# Patient Record
Sex: MALE | Race: WHITE | NOT HISPANIC OR LATINO | URBAN - METROPOLITAN AREA
[De-identification: names, ages, dates, MRNs, and addresses within clinical notes are randomized per-mention and may not be internally consistent; named-entity substitution may affect disease eponyms.]

---

## 2017-10-02 ENCOUNTER — EMERGENCY (EMERGENCY)
Facility: HOSPITAL | Age: 82
LOS: 1 days | Discharge: HOME | End: 2017-10-02
Attending: EMERGENCY MEDICINE
Payer: MEDICARE

## 2017-10-02 VITALS
RESPIRATION RATE: 18 BRPM | TEMPERATURE: 99 F | SYSTOLIC BLOOD PRESSURE: 121 MMHG | WEIGHT: 199.96 LBS | HEIGHT: 65 IN | HEART RATE: 77 BPM | DIASTOLIC BLOOD PRESSURE: 69 MMHG | OXYGEN SATURATION: 98 %

## 2017-10-02 PROCEDURE — 12002 RPR S/N/AX/GEN/TRNK2.6-7.5CM: CPT

## 2017-10-02 PROCEDURE — 70450 CT HEAD/BRAIN W/O DYE: CPT

## 2017-10-02 PROCEDURE — 72125 CT NECK SPINE W/O DYE: CPT | Mod: 26

## 2017-10-02 PROCEDURE — 99284 EMERGENCY DEPT VISIT MOD MDM: CPT | Mod: 25

## 2017-10-02 PROCEDURE — 72125 CT NECK SPINE W/O DYE: CPT

## 2017-10-02 PROCEDURE — 70450 CT HEAD/BRAIN W/O DYE: CPT | Mod: 26

## 2017-10-02 RX ORDER — ACETAMINOPHEN 500 MG
975 TABLET ORAL ONCE
Qty: 0 | Refills: 0 | Status: COMPLETED | OUTPATIENT
Start: 2017-10-02 | End: 2017-10-02

## 2017-10-02 RX ADMIN — Medication 975 MILLIGRAM(S): at 19:10

## 2017-10-02 NOTE — ED STATDOCS - CARE PLAN
Principal Discharge DX:	Scalp laceration, initial encounter  Secondary Diagnosis:	Fall, initial encounter  Secondary Diagnosis:	Traumatic injury of head, initial encounter

## 2017-10-02 NOTE — ED STATDOCS - MEDICAL DECISION MAKING DETAILS
Laceration s/p fall. not syncope. Td given by PCP today. CT of head and neck to eval for bleed/fx. ST for wound closure

## 2017-10-02 NOTE — ED STATDOCS - ATTENDING CONTRIBUTION TO CARE
I, Flakito Galvan, performed the initial face to face bedside interview with this patient regarding history of present illness, review of symptoms and relevant past medical, social and family history.  I completed an independent physical examination.  I was the initial provider who evaluated this patient. I have signed out the follow up of any pending tests (i.e. labs, radiological studies) to the ACP.  I have communicated the patient’s plan of care and disposition with the ACP.

## 2017-10-02 NOTE — ED STATDOCS - OBJECTIVE STATEMENT
87 yo M, with hx of parkinson's, spinal stenosis, presents to ED with daughter c/o head laceration s/p fall. Pt has hx of frequent falls and is following neurology. Pt states he was standing upright, lost his balance, and fell backwards hitting the back of his head sustaining a laceration. Denies preceding symptoms. Denies LOC, CP, SOB, abd pain, n/v, fever, chills, congestion. Denies blood thinners. Currently on ASA 325mg. Td is UTD

## 2017-10-02 NOTE — ED STATDOCS - PROGRESS NOTE DETAILS
85 yo M pm parkinson's and spinal stenosis presents to ED bib daughter c/o head laceration s/p fall. Pt states he was standing, lost his balance, and fell backwards hitting the back of his head against a door stop. Denies preceding symptoms. Denies LOC, HA, VC, CP, SOB, abd pain, n/v, fever, chills. Does not take aspirin or any blood thinners. Tetanus UTD. On exam patient has 4 cm horizontal laceration on occiput. PEERLA b/l. No wounds anywhere else. No C spine tenderness. Non focal neuro exam. Will f/u with intake physicians plan.

## 2017-10-02 NOTE — ED ADULT TRIAGE NOTE - CHIEF COMPLAINT QUOTE
Patient arrived to ED today with c/o fall backwards to a carpet floor while standing at home today.  patient denies LOC.  Patient does not take blood thinners.  Patient has laceration to the back of his skull.

## 2017-10-19 ENCOUNTER — RX RENEWAL (OUTPATIENT)
Age: 82
End: 2017-10-19

## 2017-12-21 ENCOUNTER — RX RENEWAL (OUTPATIENT)
Age: 82
End: 2017-12-21

## 2018-02-18 ENCOUNTER — INPATIENT (INPATIENT)
Facility: HOSPITAL | Age: 83
LOS: 2 days | Discharge: HOME | DRG: 194 | End: 2018-02-21
Attending: HOSPITALIST | Admitting: HOSPITALIST
Payer: MEDICARE

## 2018-02-18 VITALS
HEART RATE: 84 BPM | DIASTOLIC BLOOD PRESSURE: 66 MMHG | OXYGEN SATURATION: 98 % | SYSTOLIC BLOOD PRESSURE: 115 MMHG | WEIGHT: 205.03 LBS | RESPIRATION RATE: 20 BRPM | TEMPERATURE: 98 F | HEIGHT: 67 IN

## 2018-02-18 DIAGNOSIS — E86.0 DEHYDRATION: ICD-10-CM

## 2018-02-18 DIAGNOSIS — M48.00 SPINAL STENOSIS, SITE UNSPECIFIED: ICD-10-CM

## 2018-02-18 DIAGNOSIS — N40.0 BENIGN PROSTATIC HYPERPLASIA WITHOUT LOWER URINARY TRACT SYMPTOMS: ICD-10-CM

## 2018-02-18 DIAGNOSIS — Z29.9 ENCOUNTER FOR PROPHYLACTIC MEASURES, UNSPECIFIED: ICD-10-CM

## 2018-02-18 DIAGNOSIS — N17.9 ACUTE KIDNEY FAILURE, UNSPECIFIED: ICD-10-CM

## 2018-02-18 DIAGNOSIS — J10.1 INFLUENZA DUE TO OTHER IDENTIFIED INFLUENZA VIRUS WITH OTHER RESPIRATORY MANIFESTATIONS: ICD-10-CM

## 2018-02-18 DIAGNOSIS — I10 ESSENTIAL (PRIMARY) HYPERTENSION: ICD-10-CM

## 2018-02-18 DIAGNOSIS — G20 PARKINSON'S DISEASE: ICD-10-CM

## 2018-02-18 LAB
ANION GAP SERPL CALC-SCNC: 12 MMOL/L — SIGNIFICANT CHANGE UP (ref 5–17)
APPEARANCE UR: CLEAR — SIGNIFICANT CHANGE UP
BACTERIA # UR AUTO: ABNORMAL
BILIRUB UR-MCNC: NEGATIVE — SIGNIFICANT CHANGE UP
BUN SERPL-MCNC: 47 MG/DL — HIGH (ref 8–20)
CALCIUM SERPL-MCNC: 9.7 MG/DL — SIGNIFICANT CHANGE UP (ref 8.6–10.2)
CHLORIDE SERPL-SCNC: 98 MMOL/L — SIGNIFICANT CHANGE UP (ref 98–107)
CO2 SERPL-SCNC: 28 MMOL/L — SIGNIFICANT CHANGE UP (ref 22–29)
COLOR SPEC: YELLOW — SIGNIFICANT CHANGE UP
CREAT SERPL-MCNC: 1.77 MG/DL — HIGH (ref 0.5–1.3)
DIFF PNL FLD: ABNORMAL
EPI CELLS # UR: SIGNIFICANT CHANGE UP
FLUAV H1 2009 PAND RNA SPEC QL NAA+PROBE: DETECTED
GLUCOSE SERPL-MCNC: 114 MG/DL — SIGNIFICANT CHANGE UP (ref 70–115)
GLUCOSE UR QL: NEGATIVE MG/DL — SIGNIFICANT CHANGE UP
HCT VFR BLD CALC: 37 % — LOW (ref 42–52)
HGB BLD-MCNC: 12.2 G/DL — LOW (ref 14–18)
KETONES UR-MCNC: NEGATIVE — SIGNIFICANT CHANGE UP
LEUKOCYTE ESTERASE UR-ACNC: NEGATIVE — SIGNIFICANT CHANGE UP
MCHC RBC-ENTMCNC: 31.4 PG — HIGH (ref 27–31)
MCHC RBC-ENTMCNC: 33 G/DL — SIGNIFICANT CHANGE UP (ref 32–36)
MCV RBC AUTO: 95.4 FL — HIGH (ref 80–94)
NITRITE UR-MCNC: NEGATIVE — SIGNIFICANT CHANGE UP
PH UR: 6 — SIGNIFICANT CHANGE UP (ref 5–8)
PLATELET # BLD AUTO: 219 K/UL — SIGNIFICANT CHANGE UP (ref 150–400)
POTASSIUM SERPL-MCNC: 3.6 MMOL/L — SIGNIFICANT CHANGE UP (ref 3.5–5.3)
POTASSIUM SERPL-SCNC: 3.6 MMOL/L — SIGNIFICANT CHANGE UP (ref 3.5–5.3)
PROT UR-MCNC: NEGATIVE MG/DL — SIGNIFICANT CHANGE UP
RAPID RVP RESULT: DETECTED
RBC # BLD: 3.88 M/UL — LOW (ref 4.6–6.2)
RBC # FLD: 13.5 % — SIGNIFICANT CHANGE UP (ref 11–15.6)
RBC CASTS # UR COMP ASSIST: ABNORMAL /HPF (ref 0–4)
SODIUM SERPL-SCNC: 138 MMOL/L — SIGNIFICANT CHANGE UP (ref 135–145)
SP GR SPEC: 1.01 — SIGNIFICANT CHANGE UP (ref 1.01–1.02)
UROBILINOGEN FLD QL: NEGATIVE MG/DL — SIGNIFICANT CHANGE UP
WBC # BLD: 7.4 K/UL — SIGNIFICANT CHANGE UP (ref 4.8–10.8)
WBC # FLD AUTO: 7.4 K/UL — SIGNIFICANT CHANGE UP (ref 4.8–10.8)
WBC UR QL: SIGNIFICANT CHANGE UP

## 2018-02-18 PROCEDURE — 71045 X-RAY EXAM CHEST 1 VIEW: CPT | Mod: 26

## 2018-02-18 PROCEDURE — 99285 EMERGENCY DEPT VISIT HI MDM: CPT

## 2018-02-18 PROCEDURE — 99223 1ST HOSP IP/OBS HIGH 75: CPT | Mod: AI

## 2018-02-18 PROCEDURE — 76770 US EXAM ABDO BACK WALL COMP: CPT | Mod: 26

## 2018-02-18 PROCEDURE — 93970 EXTREMITY STUDY: CPT | Mod: 26

## 2018-02-18 RX ORDER — SODIUM CHLORIDE 9 MG/ML
1000 INJECTION INTRAMUSCULAR; INTRAVENOUS; SUBCUTANEOUS
Qty: 0 | Refills: 0 | Status: DISCONTINUED | OUTPATIENT
Start: 2018-02-18 | End: 2018-02-19

## 2018-02-18 RX ORDER — GABAPENTIN 400 MG/1
300 CAPSULE ORAL ONCE
Qty: 0 | Refills: 0 | Status: COMPLETED | OUTPATIENT
Start: 2018-02-18 | End: 2018-02-18

## 2018-02-18 RX ORDER — ENOXAPARIN SODIUM 100 MG/ML
40 INJECTION SUBCUTANEOUS EVERY 24 HOURS
Qty: 0 | Refills: 0 | Status: DISCONTINUED | OUTPATIENT
Start: 2018-02-18 | End: 2018-02-21

## 2018-02-18 RX ORDER — FENOFIBRATE,MICRONIZED 130 MG
48 CAPSULE ORAL DAILY
Qty: 0 | Refills: 0 | Status: DISCONTINUED | OUTPATIENT
Start: 2018-02-18 | End: 2018-02-21

## 2018-02-18 RX ORDER — ASCORBIC ACID 60 MG
250 TABLET,CHEWABLE ORAL DAILY
Qty: 0 | Refills: 0 | Status: DISCONTINUED | OUTPATIENT
Start: 2018-02-18 | End: 2018-02-21

## 2018-02-18 RX ORDER — FINASTERIDE 5 MG/1
5 TABLET, FILM COATED ORAL DAILY
Qty: 0 | Refills: 0 | Status: DISCONTINUED | OUTPATIENT
Start: 2018-02-18 | End: 2018-02-21

## 2018-02-18 RX ORDER — CARBIDOPA AND LEVODOPA 25; 100 MG/1; MG/1
1 TABLET ORAL THREE TIMES A DAY
Qty: 0 | Refills: 0 | Status: DISCONTINUED | OUTPATIENT
Start: 2018-02-18 | End: 2018-02-21

## 2018-02-18 RX ORDER — IBUPROFEN 200 MG
400 TABLET ORAL ONCE
Qty: 0 | Refills: 0 | Status: COMPLETED | OUTPATIENT
Start: 2018-02-18 | End: 2018-02-18

## 2018-02-18 RX ORDER — TAMSULOSIN HYDROCHLORIDE 0.4 MG/1
0.4 CAPSULE ORAL AT BEDTIME
Qty: 0 | Refills: 0 | Status: DISCONTINUED | OUTPATIENT
Start: 2018-02-18 | End: 2018-02-19

## 2018-02-18 RX ORDER — SODIUM CHLORIDE 9 MG/ML
3 INJECTION INTRAMUSCULAR; INTRAVENOUS; SUBCUTANEOUS ONCE
Qty: 0 | Refills: 0 | Status: COMPLETED | OUTPATIENT
Start: 2018-02-18 | End: 2018-02-18

## 2018-02-18 RX ORDER — PANTOPRAZOLE SODIUM 20 MG/1
40 TABLET, DELAYED RELEASE ORAL
Qty: 0 | Refills: 0 | Status: DISCONTINUED | OUTPATIENT
Start: 2018-02-18 | End: 2018-02-21

## 2018-02-18 RX ORDER — PYRIDOXINE HCL (VITAMIN B6) 100 MG
100 TABLET ORAL DAILY
Qty: 0 | Refills: 0 | Status: DISCONTINUED | OUTPATIENT
Start: 2018-02-18 | End: 2018-02-21

## 2018-02-18 RX ORDER — INDAPAMIDE 1.25 MG
2.5 TABLET ORAL EVERY 24 HOURS
Qty: 0 | Refills: 0 | Status: DISCONTINUED | OUTPATIENT
Start: 2018-02-18 | End: 2018-02-21

## 2018-02-18 RX ORDER — GABAPENTIN 400 MG/1
300 CAPSULE ORAL THREE TIMES A DAY
Qty: 0 | Refills: 0 | Status: DISCONTINUED | OUTPATIENT
Start: 2018-02-18 | End: 2018-02-21

## 2018-02-18 RX ADMIN — GABAPENTIN 300 MILLIGRAM(S): 400 CAPSULE ORAL at 16:40

## 2018-02-18 RX ADMIN — CARBIDOPA AND LEVODOPA 1 TABLET(S): 25; 100 TABLET ORAL at 22:03

## 2018-02-18 RX ADMIN — Medication 30 MILLIGRAM(S): at 16:39

## 2018-02-18 RX ADMIN — GABAPENTIN 300 MILLIGRAM(S): 400 CAPSULE ORAL at 22:03

## 2018-02-18 RX ADMIN — SODIUM CHLORIDE 100 MILLILITER(S): 9 INJECTION INTRAMUSCULAR; INTRAVENOUS; SUBCUTANEOUS at 16:38

## 2018-02-18 RX ADMIN — PANTOPRAZOLE SODIUM 40 MILLIGRAM(S): 20 TABLET, DELAYED RELEASE ORAL at 16:39

## 2018-02-18 RX ADMIN — SODIUM CHLORIDE 3 MILLILITER(S): 9 INJECTION INTRAMUSCULAR; INTRAVENOUS; SUBCUTANEOUS at 12:37

## 2018-02-18 RX ADMIN — SODIUM CHLORIDE 3 MILLILITER(S): 9 INJECTION INTRAMUSCULAR; INTRAVENOUS; SUBCUTANEOUS at 14:06

## 2018-02-18 RX ADMIN — Medication 400 MILLIGRAM(S): at 16:41

## 2018-02-18 RX ADMIN — SODIUM CHLORIDE 100 MILLILITER(S): 9 INJECTION INTRAMUSCULAR; INTRAVENOUS; SUBCUTANEOUS at 22:04

## 2018-02-18 RX ADMIN — TAMSULOSIN HYDROCHLORIDE 0.4 MILLIGRAM(S): 0.4 CAPSULE ORAL at 22:02

## 2018-02-18 NOTE — ED PROVIDER NOTE - MEDICAL DECISION MAKING DETAILS
PT WITH COUGH WEAKNESS RUNNY NOSE. WIFE ADMITTED YESTERDAY FOR RESP DIFFICULTY.  PT NEEDS LABS, XRAY CHEST, FLU SWAB PT WITH COUGH WEAKNESS RUNNY NOSE. WIFE ADMITTED YESTERDAY FOR RESP DIFFICULTY.  PT NEEDS LABS, XRAY CHEST, FLU SWAB  UNABLE TO EVEN PARTAKE IN PT . ADMIT FOR WEAKNESS, DEHYDRATION, GAIT DISTURBANCE

## 2018-02-18 NOTE — ED ADULT TRIAGE NOTE - CHIEF COMPLAINT QUOTE
pt BIBA for reports of SOB and cough x few days. pt reports family dx with pneumonia and thinks he is getting sick. no fever, no SOB, no chest pain, AOX3. appears comfortable

## 2018-02-18 NOTE — PHYSICAL THERAPY INITIAL EVALUATION ADULT - LEVEL OF INDEPENDENCE: SIT/STAND, REHAB EVAL
unable to perform/Increased extensor tone in trunk, pt's LEs extended at EOB, minimally able to flex Right knee to attempt to reposition pt on EOB, Unsafe to attempt to stand

## 2018-02-18 NOTE — H&P ADULT - ASSESSMENT
87 yo M with h/o Parkinson's disease, BPH, GERD, HLD, spinal stenosis here with influenza A and falls.

## 2018-02-18 NOTE — ED PROVIDER NOTE - CARE PLAN
Principal Discharge DX:	Dehydration, moderate  Secondary Diagnosis:	Weakness  Secondary Diagnosis:	Gait disturbance

## 2018-02-18 NOTE — ED PROVIDER NOTE - OBJECTIVE STATEMENT
87 YO MALE WITH COUGH . RUNNY NOSE ONE MONTH. NON PRODUCTIVE COUGH. INCREASED WEAKNESS X 4 MONTHS. DAUGHTER STATES OVER 2 WEEKS AGO HER FELL THREE TIMES. NO FEVER OR CHILLS. HE DOES HAVE EPISODES OF SWEATING BUT THIS IS NOT NEW. FOR PT. NO V/D NO HA OR DIZZY.  MED HX Noel esophagus    High Cholesterol    History of Hypertension    Hypertrophy (Benign) of Prostate    Parkinson disease    Spinal stenosis

## 2018-02-18 NOTE — H&P ADULT - NEGATIVE OPHTHALMOLOGIC SYMPTOMS
no photophobia/no diplopia
I, Neyda Lipscomb, performed the initial face to face bedside interview with this patient regarding history of present illness, review of symptoms and relevant past medical, social and family history.  I completed an independent physical examination.  I was the initial provider who evaluated this patient. I have signed out the follow up of any pending tests (i.e. labs, radiological studies) to the ACP.  I have communicated the patient’s plan of care and disposition with the ACP.

## 2018-02-18 NOTE — ED PROVIDER NOTE - PMH
Noel esophagus    High Cholesterol    History of Hypertension    Hypertrophy (Benign) of Prostate    Parkinson disease    Spinal stenosis

## 2018-02-18 NOTE — ED ADULT NURSE NOTE - OBJECTIVE STATEMENT
pt c/o a sore throat x 2 weeks , cough, bilateral rib pain and falling three times two weeks ago. pain to back 8/10 rib 2/10 and throat 4/10. pt denies vomiting or diarrhea

## 2018-02-18 NOTE — PHYSICAL THERAPY INITIAL EVALUATION ADULT - RANGE OF MOTION EXAMINATION, REHAB EVAL
bilateral upper extremity ROM was WFL (within functional limits)/deficits as listed below/Left knee AROM and PROM limited to lacking 5 degrees extension to approx 10-15 degrees flexion) , Right knee ROM limited 2*2 pain (pt is 4 years s/p TKR)

## 2018-02-18 NOTE — PHYSICAL THERAPY INITIAL EVALUATION ADULT - ADDITIONAL COMMENTS
Pt lives in a private home with his wife (currently admitted in ICU) 6 steps to enter with handrails, 6+6 steps inside with handrails. Pt was independent 3 weeks PTA with RW. Pt owns RW, SAC and commode.

## 2018-02-18 NOTE — CHART NOTE - NSCHARTNOTEFT_GEN_A_CORE
REASON FOR CONSULT: non productive cough, + flu like symtpoms    SUBJECTIVE:  85 YO MALE WITH NON PRODUCTIVE COUGH . RUNNY NOSE ONE MONTH. SIMILAR SYMPTOMS WITH HIS WIFE, WHO WAS ADMITTED ONE DAY PRIOR TO [facility] FOR PNA. INCREASED WEAKNESS X 4 MONTHS. DAUGHTER STATES OVER 2 WEEKS AGO HER FELL THREE TIMES. NO FEVER OR CHILLS. HE DOES HAVE EPISODES OF SWEATING BUT THIS IS NOT NEW. FOR PT. NO V/D NO HA OR DIZZY.  	  · Presenting Symptoms: COUGH, WEAKNESS  · Negative Findings: no chills, no fever, no nausea, no vomiting, no dysuria, or gross hematuria   · Location: NA  · Radiation: NA  · Timing: gradual onset  · Duration: week(s)  · Severity: MILD  · Aggravating Factors: NA  · Relieving Factors: NA      OBJECTIVE  ROS:  all other negative except as noted above    PHYSICAL EXAM: Tele-evaluation precludes physical exam.     LABS AND IMAGING DATA:                        12.2   7.4   )-----------( 219      ( 2018 13:09 )             37.0     02-18    138  |  98  |  47.0<H>  ----------------------------<  114  3.6   |  28.0  |  1.77<H>    Ca    9.7      2018 13:09      Urinalysis Basic - ( 2018 13:08 )    Color: Yellow / Appearance: Clear / S.015 / pH: x  Gluc: x / Ketone: Negative  / Bili: Negative / Urobili: Negative mg/dL   Blood: x / Protein: Negative mg/dL / Nitrite: Negative   Leuk Esterase: Negative / RBC: 6-10 /HPF / WBC 0-2   Sq Epi: x / Non Sq Epi: Occasional / Bacteria: Occasional    RVP: Infl A positive    CXR: lungs are clear    ASSESSMENT AND PLAN:   85 y/o male with flu like symptoms was brought in for further evaluation as his wife who had similar symptoms was admitted to [facility] one day prior to PNA. PT was also noted to have JETT with microhematuria and c/o recently developing urinary incontinence. RVP positive for influenza A, CXR lungs are clear. PT evaluation completed while in ER, recommend MORIS placement, as patient required one person assist with history of multiple falls. Medication reconcilliation not completed as family at bedside (son and daughter) does not know patient medications., they where advised to call RN once at home to verify medications.     Admit to medical unit    Influenza A positive; Non-productive Coughing, + flu like symptoms  -	Tamiflu started  -	Isolation  -	CXR grossly clear, no infectious etiology noted. No leukocytosis. Afebrile.   Failure to Thrive  -	 consult for MORIS placement vs home HA  -	Regular diet with supplement  -	Nutrition consult  JETT with Micro hematuria  -	Gentle IV hydration   -	Bladder/Kidney sonogram  -	monitor renal function in AM   Anemia - cont to monitor, no acute signs or symptoms of bleeding, no indication for transfusion      H/O Parkinson’s – high risk of falls, will need to resume home medications once family calls with medication list.     H/O multiple falls, Lumbar stenosis with neurogenic claudication  -	OOB with assistance only  -	PT consult appreciated  -	Fall / aspiration precautions    DVT prophylaxis   Care plan discussed with Sandrine

## 2018-02-18 NOTE — ED BEHAVIORAL HEALTH NOTE - BEHAVIORAL HEALTH NOTE
SW note: pt. is a 86 year old male who's wife is admitted to the hospital with pneumonia.  Pt. has history of falls and family requesting assistance.  Pt. and wife live with son and daughter comes over to assist also.  Daughter asked for information regarding getting aids at home and stated they are willing to private pay if needed.  This worker provided family with information for [employer] at home as well as list of private pay aids.  daughter stated they will contact and set them up.  Asked pt. if he is interested in home PT, pt. reluctantly agreed.  MD was made aware and put in a PT consult.

## 2018-02-18 NOTE — ED CLERICAL - NS ED CLERK UNITS
2BRK/need 2nd orders 2BRK/ISO ISO/+ Flu A/2BRK 2GUL/2305-01 ISO/+ Flu A 2GUL/ISO/+ Flu A 2413-02 +FLU A/2GUL 391717/2BRK

## 2018-02-18 NOTE — H&P ADULT - RS GEN PE MLT RESP DETAILS PC
no intercostal retractions/breath sounds equal/respirations non-labored/no rales/no rhonchi/good air movement/airway patent/clear to auscultation bilaterally

## 2018-02-18 NOTE — H&P ADULT - HISTORY OF PRESENT ILLNESS
87 yo M with h/o Parkinson's disease, BPH, GERD, HLD, spinal stenosis presents with one month of runny nose. Pt notes worsening weakness and having fallen 3 times over the last 2 weeks. Pt denies any fevers or chills. States that his wife had similar symptoms and was recently hospitalized for PNA. In the ED, pt was found to be influenza A +.

## 2018-02-18 NOTE — PHYSICAL THERAPY INITIAL EVALUATION ADULT - PERTINENT HX OF CURRENT PROBLEM, REHAB EVAL
Per MD Note: 85 YO MALE WITH COUGH . RUNNY NOSE ONE MONTH. NON PRODUCTIVE COUGH. INCREASED WEAKNESS X 4 MONTHS. DAUGHTER STATES OVER 2 WEEKS AGO HER FELL THREE TIMES. Per MD Note: 87 YO MALE WITH COUGH . RUNNY NOSE ONE MONTH. NON PRODUCTIVE COUGH. INCREASED WEAKNESS X 4 MONTHS. DAUGHTER STATES OVER 2 WEEKS AGO HER FELL THREE TIMES. Pt admitted for r/o flu

## 2018-02-18 NOTE — PHYSICAL THERAPY INITIAL EVALUATION ADULT - GENERAL OBSERVATIONS, REHAB EVAL
Pt received supine on stretcher in ED, daughter at bedside, c/o pain in bilateral quads 2*2 "cramping" Pt agreeable to PT

## 2018-02-19 LAB
ANION GAP SERPL CALC-SCNC: 13 MMOL/L — SIGNIFICANT CHANGE UP (ref 5–17)
BUN SERPL-MCNC: 38 MG/DL — HIGH (ref 8–20)
CALCIUM SERPL-MCNC: 8.8 MG/DL — SIGNIFICANT CHANGE UP (ref 8.6–10.2)
CHLORIDE SERPL-SCNC: 102 MMOL/L — SIGNIFICANT CHANGE UP (ref 98–107)
CO2 SERPL-SCNC: 26 MMOL/L — SIGNIFICANT CHANGE UP (ref 22–29)
CREAT SERPL-MCNC: 1.51 MG/DL — HIGH (ref 0.5–1.3)
GLUCOSE SERPL-MCNC: 104 MG/DL — SIGNIFICANT CHANGE UP (ref 70–115)
HCT VFR BLD CALC: 34.4 % — LOW (ref 42–52)
HGB BLD-MCNC: 11 G/DL — LOW (ref 14–18)
MAGNESIUM SERPL-MCNC: 1.8 MG/DL — SIGNIFICANT CHANGE UP (ref 1.6–2.6)
MCHC RBC-ENTMCNC: 30.9 PG — SIGNIFICANT CHANGE UP (ref 27–31)
MCHC RBC-ENTMCNC: 32 G/DL — SIGNIFICANT CHANGE UP (ref 32–36)
MCV RBC AUTO: 96.6 FL — HIGH (ref 80–94)
PHOSPHATE SERPL-MCNC: 2.8 MG/DL — SIGNIFICANT CHANGE UP (ref 2.4–4.7)
PLATELET # BLD AUTO: 212 K/UL — SIGNIFICANT CHANGE UP (ref 150–400)
POTASSIUM SERPL-MCNC: 3.3 MMOL/L — LOW (ref 3.5–5.3)
POTASSIUM SERPL-SCNC: 3.3 MMOL/L — LOW (ref 3.5–5.3)
RBC # BLD: 3.56 M/UL — LOW (ref 4.6–6.2)
RBC # FLD: 13.9 % — SIGNIFICANT CHANGE UP (ref 11–15.6)
SODIUM SERPL-SCNC: 141 MMOL/L — SIGNIFICANT CHANGE UP (ref 135–145)
WBC # BLD: 5 K/UL — SIGNIFICANT CHANGE UP (ref 4.8–10.8)
WBC # FLD AUTO: 5 K/UL — SIGNIFICANT CHANGE UP (ref 4.8–10.8)

## 2018-02-19 PROCEDURE — 99232 SBSQ HOSP IP/OBS MODERATE 35: CPT

## 2018-02-19 RX ORDER — TAMSULOSIN HYDROCHLORIDE 0.4 MG/1
0.8 CAPSULE ORAL AT BEDTIME
Qty: 0 | Refills: 0 | Status: DISCONTINUED | OUTPATIENT
Start: 2018-02-19 | End: 2018-02-21

## 2018-02-19 RX ORDER — ACETAMINOPHEN 500 MG
650 TABLET ORAL ONCE
Qty: 0 | Refills: 0 | Status: COMPLETED | OUTPATIENT
Start: 2018-02-19 | End: 2018-02-19

## 2018-02-19 RX ORDER — POTASSIUM CHLORIDE 20 MEQ
40 PACKET (EA) ORAL EVERY 4 HOURS
Qty: 0 | Refills: 0 | Status: COMPLETED | OUTPATIENT
Start: 2018-02-19 | End: 2018-02-19

## 2018-02-19 RX ADMIN — GABAPENTIN 300 MILLIGRAM(S): 400 CAPSULE ORAL at 13:06

## 2018-02-19 RX ADMIN — CARBIDOPA AND LEVODOPA 1 TABLET(S): 25; 100 TABLET ORAL at 05:31

## 2018-02-19 RX ADMIN — Medication 2.5 MILLIGRAM(S): at 05:31

## 2018-02-19 RX ADMIN — Medication 650 MILLIGRAM(S): at 23:26

## 2018-02-19 RX ADMIN — CARBIDOPA AND LEVODOPA 1 TABLET(S): 25; 100 TABLET ORAL at 13:06

## 2018-02-19 RX ADMIN — PANTOPRAZOLE SODIUM 40 MILLIGRAM(S): 20 TABLET, DELAYED RELEASE ORAL at 05:31

## 2018-02-19 RX ADMIN — SODIUM CHLORIDE 100 MILLILITER(S): 9 INJECTION INTRAMUSCULAR; INTRAVENOUS; SUBCUTANEOUS at 01:48

## 2018-02-19 RX ADMIN — GABAPENTIN 300 MILLIGRAM(S): 400 CAPSULE ORAL at 05:31

## 2018-02-19 RX ADMIN — Medication 30 MILLIGRAM(S): at 05:31

## 2018-02-19 RX ADMIN — PANTOPRAZOLE SODIUM 40 MILLIGRAM(S): 20 TABLET, DELAYED RELEASE ORAL at 17:22

## 2018-02-19 RX ADMIN — FINASTERIDE 5 MILLIGRAM(S): 5 TABLET, FILM COATED ORAL at 13:06

## 2018-02-19 RX ADMIN — Medication 1 TABLET(S): at 11:19

## 2018-02-19 RX ADMIN — GABAPENTIN 300 MILLIGRAM(S): 400 CAPSULE ORAL at 21:46

## 2018-02-19 RX ADMIN — Medication 100 MILLIGRAM(S): at 11:20

## 2018-02-19 RX ADMIN — Medication 1 TABLET(S): at 11:18

## 2018-02-19 RX ADMIN — Medication 250 MILLIGRAM(S): at 11:19

## 2018-02-19 RX ADMIN — Medication 30 MILLIGRAM(S): at 17:22

## 2018-02-19 RX ADMIN — Medication 40 MILLIEQUIVALENT(S): at 17:22

## 2018-02-19 RX ADMIN — Medication 40 MILLIEQUIVALENT(S): at 13:05

## 2018-02-19 RX ADMIN — TAMSULOSIN HYDROCHLORIDE 0.8 MILLIGRAM(S): 0.4 CAPSULE ORAL at 21:46

## 2018-02-19 RX ADMIN — Medication 40 MILLIEQUIVALENT(S): at 11:18

## 2018-02-19 RX ADMIN — Medication 48 MILLIGRAM(S): at 11:21

## 2018-02-19 RX ADMIN — CARBIDOPA AND LEVODOPA 1 TABLET(S): 25; 100 TABLET ORAL at 21:46

## 2018-02-20 ENCOUNTER — TRANSCRIPTION ENCOUNTER (OUTPATIENT)
Age: 83
End: 2018-02-20

## 2018-02-20 LAB
ANION GAP SERPL CALC-SCNC: 9 MMOL/L — SIGNIFICANT CHANGE UP (ref 5–17)
BUN SERPL-MCNC: 33 MG/DL — HIGH (ref 8–20)
CALCIUM SERPL-MCNC: 9 MG/DL — SIGNIFICANT CHANGE UP (ref 8.6–10.2)
CHLORIDE SERPL-SCNC: 104 MMOL/L — SIGNIFICANT CHANGE UP (ref 98–107)
CO2 SERPL-SCNC: 28 MMOL/L — SIGNIFICANT CHANGE UP (ref 22–29)
CREAT SERPL-MCNC: 1.39 MG/DL — HIGH (ref 0.5–1.3)
GLUCOSE SERPL-MCNC: 103 MG/DL — SIGNIFICANT CHANGE UP (ref 70–115)
HCT VFR BLD CALC: 35.7 % — LOW (ref 42–52)
HGB BLD-MCNC: 11.5 G/DL — LOW (ref 14–18)
MCHC RBC-ENTMCNC: 31.2 PG — HIGH (ref 27–31)
MCHC RBC-ENTMCNC: 32.2 G/DL — SIGNIFICANT CHANGE UP (ref 32–36)
MCV RBC AUTO: 96.7 FL — HIGH (ref 80–94)
PLATELET # BLD AUTO: 205 K/UL — SIGNIFICANT CHANGE UP (ref 150–400)
POTASSIUM SERPL-MCNC: 4.2 MMOL/L — SIGNIFICANT CHANGE UP (ref 3.5–5.3)
POTASSIUM SERPL-SCNC: 4.2 MMOL/L — SIGNIFICANT CHANGE UP (ref 3.5–5.3)
RBC # BLD: 3.69 M/UL — LOW (ref 4.6–6.2)
RBC # FLD: 13.7 % — SIGNIFICANT CHANGE UP (ref 11–15.6)
SODIUM SERPL-SCNC: 141 MMOL/L — SIGNIFICANT CHANGE UP (ref 135–145)
WBC # BLD: 5.7 K/UL — SIGNIFICANT CHANGE UP (ref 4.8–10.8)
WBC # FLD AUTO: 5.7 K/UL — SIGNIFICANT CHANGE UP (ref 4.8–10.8)

## 2018-02-20 PROCEDURE — 99232 SBSQ HOSP IP/OBS MODERATE 35: CPT

## 2018-02-20 RX ORDER — TAMSULOSIN HYDROCHLORIDE 0.4 MG/1
1 CAPSULE ORAL
Qty: 0 | Refills: 0 | DISCHARGE
Start: 2018-02-20

## 2018-02-20 RX ORDER — TAMSULOSIN HYDROCHLORIDE 0.4 MG/1
2 CAPSULE ORAL
Qty: 0 | Refills: 0 | DISCHARGE
Start: 2018-02-20

## 2018-02-20 RX ORDER — IBUPROFEN 200 MG
400 TABLET ORAL THREE TIMES A DAY
Qty: 0 | Refills: 0 | Status: DISCONTINUED | OUTPATIENT
Start: 2018-02-20 | End: 2018-02-21

## 2018-02-20 RX ORDER — TAMSULOSIN HYDROCHLORIDE 0.4 MG/1
1 CAPSULE ORAL
Qty: 0 | Refills: 0 | COMMUNITY

## 2018-02-20 RX ORDER — IBUPROFEN 200 MG
1 TABLET ORAL
Qty: 0 | Refills: 0 | COMMUNITY
Start: 2018-02-20

## 2018-02-20 RX ADMIN — ENOXAPARIN SODIUM 40 MILLIGRAM(S): 100 INJECTION SUBCUTANEOUS at 05:40

## 2018-02-20 RX ADMIN — Medication 400 MILLIGRAM(S): at 22:50

## 2018-02-20 RX ADMIN — Medication 30 MILLIGRAM(S): at 05:40

## 2018-02-20 RX ADMIN — CARBIDOPA AND LEVODOPA 1 TABLET(S): 25; 100 TABLET ORAL at 14:00

## 2018-02-20 RX ADMIN — Medication 250 MILLIGRAM(S): at 13:03

## 2018-02-20 RX ADMIN — GABAPENTIN 300 MILLIGRAM(S): 400 CAPSULE ORAL at 21:47

## 2018-02-20 RX ADMIN — TAMSULOSIN HYDROCHLORIDE 0.8 MILLIGRAM(S): 0.4 CAPSULE ORAL at 21:47

## 2018-02-20 RX ADMIN — Medication 1 TABLET(S): at 13:02

## 2018-02-20 RX ADMIN — GABAPENTIN 300 MILLIGRAM(S): 400 CAPSULE ORAL at 05:40

## 2018-02-20 RX ADMIN — Medication 400 MILLIGRAM(S): at 21:50

## 2018-02-20 RX ADMIN — PANTOPRAZOLE SODIUM 40 MILLIGRAM(S): 20 TABLET, DELAYED RELEASE ORAL at 17:01

## 2018-02-20 RX ADMIN — Medication 400 MILLIGRAM(S): at 13:07

## 2018-02-20 RX ADMIN — GABAPENTIN 300 MILLIGRAM(S): 400 CAPSULE ORAL at 13:02

## 2018-02-20 RX ADMIN — CARBIDOPA AND LEVODOPA 1 TABLET(S): 25; 100 TABLET ORAL at 05:40

## 2018-02-20 RX ADMIN — Medication 400 MILLIGRAM(S): at 14:00

## 2018-02-20 RX ADMIN — FINASTERIDE 5 MILLIGRAM(S): 5 TABLET, FILM COATED ORAL at 13:02

## 2018-02-20 RX ADMIN — Medication 100 MILLIGRAM(S): at 13:02

## 2018-02-20 RX ADMIN — Medication 1 TABLET(S): at 13:59

## 2018-02-20 RX ADMIN — Medication 30 MILLIGRAM(S): at 17:01

## 2018-02-20 RX ADMIN — Medication 48 MILLIGRAM(S): at 13:02

## 2018-02-20 RX ADMIN — CARBIDOPA AND LEVODOPA 1 TABLET(S): 25; 100 TABLET ORAL at 21:47

## 2018-02-20 RX ADMIN — PANTOPRAZOLE SODIUM 40 MILLIGRAM(S): 20 TABLET, DELAYED RELEASE ORAL at 05:40

## 2018-02-20 RX ADMIN — Medication 2.5 MILLIGRAM(S): at 05:40

## 2018-02-20 NOTE — DISCHARGE NOTE ADULT - SECONDARY DIAGNOSIS.
JETT (acute kidney injury) Dehydration, moderate Essential hypertension Parkinson disease Spinal stenosis BPH (benign prostatic hyperplasia)

## 2018-02-20 NOTE — DISCHARGE NOTE ADULT - HOSPITAL COURSE
87 yo M with h/o Parkinson's disease, BPH, GERD, HLD, spinal stenosis presents with one month of runny nose. Pt notes worsening weakness and having fallen 3 times over the last 2 weeks. Pt denies any fevers or chills. States that his wife had similar symptoms and was recently hospitalized for PNA. In the ED, pt was found to be influenza A +. Started on tamiflu. Found to have JETT which improved with IVF hydration. Renal sono negative for hydronephrosis, but increased residual volume. Flomax increased to 0.8mg. Physical therapy recommending MORIS.     Time to discharge: >35 minutes spent coordinating care  Pt and daughter, Demi, agreeable to discharge plan.

## 2018-02-20 NOTE — OCCUPATIONAL THERAPY INITIAL EVALUATION ADULT - VISUAL ACUITY
pt denies current changes/issues with vision however with history of visual deficits (i.e. macular degeneration)

## 2018-02-20 NOTE — DISCHARGE NOTE ADULT - CARE PLAN
Principal Discharge DX:	Influenza A  Goal:	Therapeutic optimization  Assessment and plan of treatment:	Continue with Tamiflu until 2/22/18. Follow up with your regular doctor in one week.  Secondary Diagnosis:	EJTT (acute kidney injury)  Assessment and plan of treatment:	Stable. Maintain oral hydration. Follow up with your regular doctor in one week.  Secondary Diagnosis:	Dehydration, moderate  Assessment and plan of treatment:	Resolved. Maintain oral hydration in one week.  Secondary Diagnosis:	Essential hypertension  Assessment and plan of treatment:	Continue with indapamide.  Secondary Diagnosis:	Parkinson disease  Assessment and plan of treatment:	Continue with sinemet.  Secondary Diagnosis:	Spinal stenosis  Assessment and plan of treatment:	Continue with ibuprofen as needed.  Secondary Diagnosis:	BPH (benign prostatic hyperplasia)  Assessment and plan of treatment:	Your flomax was increased to 0.8mg. Follow up with your urologist in one week.

## 2018-02-20 NOTE — DISCHARGE NOTE ADULT - PATIENT PORTAL LINK FT
You can access the [facility] Patient Portal, offered by [facility], by registering with the following website: [email or web address]

## 2018-02-20 NOTE — DISCHARGE NOTE ADULT - MEDICATION SUMMARY - MEDICATIONS TO TAKE
I will START or STAY ON the medications listed below when I get home from the hospital:    finasteride 5 mg oral tablet  -- 1 tab(s) by mouth once a day  -- Indication: For BPH (benign prostatic hyperplasia)    ibuprofen 400 mg oral tablet  -- 1 tab(s) by mouth 3 times a day, As needed, pain  -- Indication: For Spinal stenosis    Flomax 0.4 mg oral capsule  -- 2 cap(s) by mouth once a day (at bedtime)  -- Indication: For BPH (benign prostatic hyperplasia)    gabapentin 300 mg oral capsule  -- 1 cap(s) by mouth 3 times a day  -- Indication: For Neuropathy    fenofibric acid 45 mg oral delayed release capsule  -- 1 cap(s) by mouth once a day  -- Indication: For HLD    carbidopa-levodopa 25 mg-250 mg oral tablet  -- 1 tab(s) by mouth 3 times a day  -- Indication: For Parkinson disease    oseltamivir 30 mg oral capsule  -- 1 cap(s) by mouth 2 times a day  -- Indication: For Influenza A    indapamide 2.5 mg oral tablet  -- 1 tab(s) by mouth once a day (in the morning)  -- Indication: For Htn    pantoprazole 40 mg oral delayed release tablet  -- 1 tab(s) by mouth 2 times a day (before meals)  -- Indication: For Gerd    Multiple Vitamins oral tablet  -- 1 tab(s) by mouth once a day  -- Indication: For Supplement    Calcium 600+D oral tablet  -- 1 tab(s) by mouth once a day  -- Indication: For Supplement    PreserVision AREDS 2 oral capsule  -- 1 cap(s) by mouth once a day  -- Indication: For Supplement    Vitamin B6 100 mg oral tablet  -- 1 tab(s) by mouth once a day  -- Indication: For Supplement    Vitamin C 250 mg oral tablet  -- 1 tab(s) by mouth once a day  -- Indication: For Supplement

## 2018-02-20 NOTE — OCCUPATIONAL THERAPY INITIAL EVALUATION ADULT - FINE MOTOR COORDINATION EXAM
fine motor coordination impairments premorbid as per daughter due to Parkinson's and c-spinal stenosis

## 2018-02-20 NOTE — DISCHARGE NOTE ADULT - PLAN OF CARE
Therapeutic optimization Continue with Tamiflu until 2/22/18. Follow up with your regular doctor in one week. Stable. Maintain oral hydration. Follow up with your regular doctor in one week. Resolved. Maintain oral hydration in one week. Continue with indapamide. Continue with sinemet. Continue with ibuprofen as needed. Your flomax was increased to 0.8mg. Follow up with your urologist in one week.

## 2018-02-20 NOTE — OCCUPATIONAL THERAPY INITIAL EVALUATION ADULT - NS ASR FOLLOW COMMAND OT EVAL
decreased processing; requires repetition and increased time/able to follow single-step instructions/75% of the time

## 2018-02-20 NOTE — OCCUPATIONAL THERAPY INITIAL EVALUATION ADULT - ADDITIONAL COMMENTS
Pt lives in private home with 6 DUSTIN and 6 steps inside up to bedroom/bathroom. Bathroom with shower with doors and (+) grab bars in shower. Pt owns RW, cane, commode. Pt is right handed. Pt does not drive. Daughter/pt reports frequent falls prior to admission.   Pt's wife with multiple medical issues and is currently admitted to [facility] as well. Daughter lives local to pt and is supportive (cooks pt meals, drives pt to appointments, etc).

## 2018-02-21 VITALS
TEMPERATURE: 98 F | RESPIRATION RATE: 18 BRPM | SYSTOLIC BLOOD PRESSURE: 117 MMHG | OXYGEN SATURATION: 96 % | DIASTOLIC BLOOD PRESSURE: 71 MMHG | HEART RATE: 73 BPM

## 2018-02-21 LAB
ANION GAP SERPL CALC-SCNC: 12 MMOL/L — SIGNIFICANT CHANGE UP (ref 5–17)
BUN SERPL-MCNC: 33 MG/DL — HIGH (ref 8–20)
CALCIUM SERPL-MCNC: 9.1 MG/DL — SIGNIFICANT CHANGE UP (ref 8.6–10.2)
CHLORIDE SERPL-SCNC: 101 MMOL/L — SIGNIFICANT CHANGE UP (ref 98–107)
CO2 SERPL-SCNC: 27 MMOL/L — SIGNIFICANT CHANGE UP (ref 22–29)
CREAT SERPL-MCNC: 1.48 MG/DL — HIGH (ref 0.5–1.3)
GLUCOSE SERPL-MCNC: 97 MG/DL — SIGNIFICANT CHANGE UP (ref 70–115)
HCT VFR BLD CALC: 36.5 % — LOW (ref 42–52)
HGB BLD-MCNC: 12 G/DL — LOW (ref 14–18)
MCHC RBC-ENTMCNC: 31.3 PG — HIGH (ref 27–31)
MCHC RBC-ENTMCNC: 32.9 G/DL — SIGNIFICANT CHANGE UP (ref 32–36)
MCV RBC AUTO: 95.1 FL — HIGH (ref 80–94)
PLATELET # BLD AUTO: 225 K/UL — SIGNIFICANT CHANGE UP (ref 150–400)
POTASSIUM SERPL-MCNC: 4 MMOL/L — SIGNIFICANT CHANGE UP (ref 3.5–5.3)
POTASSIUM SERPL-SCNC: 4 MMOL/L — SIGNIFICANT CHANGE UP (ref 3.5–5.3)
RBC # BLD: 3.84 M/UL — LOW (ref 4.6–6.2)
RBC # FLD: 13.4 % — SIGNIFICANT CHANGE UP (ref 11–15.6)
SODIUM SERPL-SCNC: 140 MMOL/L — SIGNIFICANT CHANGE UP (ref 135–145)
WBC # BLD: 7.7 K/UL — SIGNIFICANT CHANGE UP (ref 4.8–10.8)
WBC # FLD AUTO: 7.7 K/UL — SIGNIFICANT CHANGE UP (ref 4.8–10.8)

## 2018-02-21 PROCEDURE — 36415 COLL VENOUS BLD VENIPUNCTURE: CPT

## 2018-02-21 PROCEDURE — 97535 SELF CARE MNGMENT TRAINING: CPT

## 2018-02-21 PROCEDURE — 81001 URINALYSIS AUTO W/SCOPE: CPT

## 2018-02-21 PROCEDURE — 80048 BASIC METABOLIC PNL TOTAL CA: CPT

## 2018-02-21 PROCEDURE — 87798 DETECT AGENT NOS DNA AMP: CPT

## 2018-02-21 PROCEDURE — 97530 THERAPEUTIC ACTIVITIES: CPT

## 2018-02-21 PROCEDURE — 87633 RESP VIRUS 12-25 TARGETS: CPT

## 2018-02-21 PROCEDURE — 85027 COMPLETE CBC AUTOMATED: CPT

## 2018-02-21 PROCEDURE — 71045 X-RAY EXAM CHEST 1 VIEW: CPT

## 2018-02-21 PROCEDURE — 84100 ASSAY OF PHOSPHORUS: CPT

## 2018-02-21 PROCEDURE — 97163 PT EVAL HIGH COMPLEX 45 MIN: CPT

## 2018-02-21 PROCEDURE — 87581 M.PNEUMON DNA AMP PROBE: CPT

## 2018-02-21 PROCEDURE — 99239 HOSP IP/OBS DSCHRG MGMT >30: CPT

## 2018-02-21 PROCEDURE — 99285 EMERGENCY DEPT VISIT HI MDM: CPT | Mod: 25

## 2018-02-21 PROCEDURE — 97167 OT EVAL HIGH COMPLEX 60 MIN: CPT

## 2018-02-21 PROCEDURE — 83735 ASSAY OF MAGNESIUM: CPT

## 2018-02-21 PROCEDURE — 93970 EXTREMITY STUDY: CPT

## 2018-02-21 PROCEDURE — 87486 CHLMYD PNEUM DNA AMP PROBE: CPT

## 2018-02-21 PROCEDURE — 76770 US EXAM ABDO BACK WALL COMP: CPT

## 2018-02-21 RX ADMIN — GABAPENTIN 300 MILLIGRAM(S): 400 CAPSULE ORAL at 06:04

## 2018-02-21 RX ADMIN — CARBIDOPA AND LEVODOPA 1 TABLET(S): 25; 100 TABLET ORAL at 06:04

## 2018-02-21 RX ADMIN — Medication 30 MILLIGRAM(S): at 06:04

## 2018-02-21 RX ADMIN — Medication 2.5 MILLIGRAM(S): at 06:04

## 2018-02-21 RX ADMIN — CARBIDOPA AND LEVODOPA 1 TABLET(S): 25; 100 TABLET ORAL at 13:29

## 2018-02-21 RX ADMIN — Medication 1 TABLET(S): at 11:10

## 2018-02-21 RX ADMIN — PANTOPRAZOLE SODIUM 40 MILLIGRAM(S): 20 TABLET, DELAYED RELEASE ORAL at 06:04

## 2018-02-21 RX ADMIN — FINASTERIDE 5 MILLIGRAM(S): 5 TABLET, FILM COATED ORAL at 11:11

## 2018-02-21 RX ADMIN — Medication 1 TABLET(S): at 11:11

## 2018-02-21 RX ADMIN — Medication 48 MILLIGRAM(S): at 11:10

## 2018-02-21 RX ADMIN — Medication 100 MILLIGRAM(S): at 11:11

## 2018-02-21 RX ADMIN — ENOXAPARIN SODIUM 40 MILLIGRAM(S): 100 INJECTION SUBCUTANEOUS at 06:04

## 2018-02-21 RX ADMIN — GABAPENTIN 300 MILLIGRAM(S): 400 CAPSULE ORAL at 13:29

## 2018-02-21 RX ADMIN — Medication 250 MILLIGRAM(S): at 11:10

## 2018-02-21 NOTE — PROGRESS NOTE ADULT - PROBLEM SELECTOR PLAN 3
Likely 2/2 dehydration 2/2 influenza  improving  encourage oral hydration  renal sono reviewed - no hydronephrosis
Likely 2/2 dehydration 2/2 influenza  stable  encourage oral hydration  renal sono reviewed - no hydronephrosis
Likely 2/2 dehydration 2/2 influenza  improving  encourage oral hydration  renal sono reviewed - no hydronephrosis

## 2018-02-21 NOTE — PROGRESS NOTE ADULT - PROBLEM SELECTOR PLAN 2
resolved  encourage oral hydration  Likely 2/2 influenza A

## 2018-02-21 NOTE — PROGRESS NOTE ADULT - PROBLEM SELECTOR PLAN 6
Renal sono showing increased residual urine  Increase flomax 0.8mg qhs  c/w finasteride
Renal sono showing increased residual urine  Increase flomax 0.8mg qhs  c/w finasteride
c/w flomax 0.8mg qhs  c/w finasteride

## 2018-02-21 NOTE — PROGRESS NOTE ADULT - PROBLEM SELECTOR PLAN 1
+RVP  c/w tamiflu day 2/5 - renally dosed  Isolation precautions
+RVP  c/w tamiflu day 4/5 - renally dosed  Isolation precautions
+RVP  c/w tamiflu day 3/5 - renally dosed  Isolation precautions

## 2018-02-21 NOTE — PROGRESS NOTE ADULT - PROBLEM SELECTOR PLAN 7
C/w gabapentin  As per pt and daughter request - pain mgt consult pending
C/w gabapentin  As per pt and daughter request - pain mgt consult pending
C/w gabapentin  As per pt and daughter request - pain mgt consulted

## 2018-02-21 NOTE — PROGRESS NOTE ADULT - SUBJECTIVE AND OBJECTIVE BOX
JAH WARD    [id number]    86y      Male    INTERVAL HPI/OVERNIGHT EVENTS: No events on. Eating lunch. Offers no complaints.    Hospital course:  85 yo M with h/o Parkinson's disease, BPH, GERD, HLD, spinal stenosis presents with one month of runny nose. Pt notes worsening weakness and having fallen 3 times over the last 2 weeks. Pt denies any fevers or chills. States that his wife had similar symptoms and was recently hospitalized for PNA. In the ED, pt was found to be influenza A +. Started on tamiflu. Found to have JETT which improved with IVF hydration. Renal sono negative for hydronephrosis, but increased residual volume. Flomax increased to 0.8mg. Physical therapy recommending [facility].     REVIEW OF SYSTEMS:    CONSTITUTIONAL: No fever   RESPIRATORY: No cough   CARDIOVASCULAR: No chest pain     Vital Signs Last 24 Hrs  T(C): 36.8 (21 Feb 2018 04:51), Max: 36.8 (21 Feb 2018 04:51)  T(F): 98.2 (21 Feb 2018 04:51), Max: 98.2 (21 Feb 2018 04:51)  HR: 78 (21 Feb 2018 04:51) (74 - 80)  BP: 148/76 (21 Feb 2018 04:51) (132/78 - 154/84)  BP(mean): --  RR: 16 (21 Feb 2018 04:51) (16 - 18)  SpO2: 97% (21 Feb 2018 04:51) (96% - 98%)    PHYSICAL EXAM:    GENERAL: NAD   HEENT: PERRL, +EOMI, MMM  CHEST/LUNG: Clear to percussion bilaterally; No wheezing  HEART: S1S2+, Regular rate and rhythm   ABDOMEN: Soft, Nontender, Nondistended; Bowel sounds present       LABS:                        12.0   7.7   )-----------( 225      ( 21 Feb 2018 08:24 )             36.5     02-21    140  |  101  |  33.0<H>  ----------------------------<  97  4.0   |  27.0  |  1.48<H>    Ca    9.1      21 Feb 2018 08:24              MEDICATIONS  (STANDING):  ascorbic acid 250 milliGRAM(s) Oral daily  calcium carbonate  625 mG + Vitamin D (OsCal 250 + D) 1 Tablet(s) Oral daily  carbidopa/levodopa  25/250 1 Tablet(s) Oral three times a day  enoxaparin Injectable 40 milliGRAM(s) SubCutaneous every 24 hours  fenofibrate Tablet 48 milliGRAM(s) Oral daily  finasteride 5 milliGRAM(s) Oral daily  gabapentin 300 milliGRAM(s) Oral three times a day  indapamide 2.5 milliGRAM(s) Oral every 24 hours  multivitamin 1 Tablet(s) Oral daily  oseltamivir 30 milliGRAM(s) Oral two times a day  pantoprazole    Tablet 40 milliGRAM(s) Oral two times a day before meals  pyridoxine 100 milliGRAM(s) Oral daily  tamsulosin 0.8 milliGRAM(s) Oral at bedtime    MEDICATIONS  (PRN):  ibuprofen  Tablet 400 milliGRAM(s) Oral three times a day PRN pain      RADIOLOGY & ADDITIONAL TESTS:
JAH WARD    [id number]    86y      Male    INTERVAL HPI/OVERNIGHT EVENTS: No events on. Daughter at bedside. Pt offers no complaints.     Hospital course:  85 yo M with h/o Parkinson's disease, BPH, GERD, HLD, spinal stenosis presents with one month of runny nose. Pt notes worsening weakness and having fallen 3 times over the last 2 weeks. Pt denies any fevers or chills. States that his wife had similar symptoms and was recently hospitalized for PNA. In the ED, pt was found to be influenza A +. Started on tamiflu. Found to have JETT which improved with IVF hydration. Renal sono negative for hydronephrosis, but increased residual volume. Flomax increased to 0.8mg. Physical therapy recommending [facility].       REVIEW OF SYSTEMS:    CONSTITUTIONAL: No fever   RESPIRATORY: No cough  CARDIOVASCULAR: No chest pain     Vital Signs Last 24 Hrs  T(C): 36 (2018 08:12), Max: 36.8 (2018 20:12)  T(F): 96.8 (2018 08:12), Max: 98.2 (2018 20:12)  HR: 70 (2018 08:12) (64 - 81)  BP: 125/67 (2018 08:12) (121/65 - 150/80)  BP(mean): --  RR: 18 (2018 08:12) (17 - 19)  SpO2: 98% (2018 08:12) (95% - 98%)    PHYSICAL EXAM:    GENERAL: NAD, well-groomed  HEENT: PERRL, +EOMI  CHEST/LUNG: Clear to percussion bilaterally; No wheezing  HEART: S1S2+, Regular rate and rhythm   ABDOMEN: Soft, Nontender, Nondistended; Bowel sounds present      LABS:                        11.5   5.7   )-----------( 205      ( 2018 08:31 )             35.7     02-20    141  |  104  |  33.0<H>  ----------------------------<  103  4.2   |  28.0  |  1.39<H>    Ca    9.0      2018 08:31  Phos  2.8     02-  Mg     1.8             Urinalysis Basic - ( 2018 13:08 )    Color: Yellow / Appearance: Clear / S.015 / pH: x  Gluc: x / Ketone: Negative  / Bili: Negative / Urobili: Negative mg/dL   Blood: x / Protein: Negative mg/dL / Nitrite: Negative   Leuk Esterase: Negative / RBC: 6-10 /HPF / WBC 0-2   Sq Epi: x / Non Sq Epi: Occasional / Bacteria: Occasional          MEDICATIONS  (STANDING):  ascorbic acid 250 milliGRAM(s) Oral daily  calcium carbonate  625 mG + Vitamin D (OsCal 250 + D) 1 Tablet(s) Oral daily  carbidopa/levodopa  25/250 1 Tablet(s) Oral three times a day  enoxaparin Injectable 40 milliGRAM(s) SubCutaneous every 24 hours  fenofibrate Tablet 48 milliGRAM(s) Oral daily  finasteride 5 milliGRAM(s) Oral daily  gabapentin 300 milliGRAM(s) Oral three times a day  indapamide 2.5 milliGRAM(s) Oral every 24 hours  multivitamin 1 Tablet(s) Oral daily  oseltamivir 30 milliGRAM(s) Oral two times a day  pantoprazole    Tablet 40 milliGRAM(s) Oral two times a day before meals  pyridoxine 100 milliGRAM(s) Oral daily  tamsulosin 0.8 milliGRAM(s) Oral at bedtime    MEDICATIONS  (PRN):      RADIOLOGY & ADDITIONAL TESTS:
JAH WARD    [id number]    86y      Male    INTERVAL HPI/OVERNIGHT EVENTS: No events on. Daughter, Demi, at bedside. Pt states that he feels better.    Hospital course:  85 yo M with h/o Parkinson's disease, BPH, GERD, HLD, spinal stenosis presents with one month of runny nose. Pt notes worsening weakness and having fallen 3 times over the last 2 weeks. Pt denies any fevers or chills. States that his wife had similar symptoms and was recently hospitalized for PNA. In the ED, pt was found to be influenza A +. Started on tamiflu. Found to have JETT which improved with IVF hydration. Renal sono negative for hydronephrosis, but increased residual volume. Flomax increased to 0.8mg. Physical therapy recommending [facility].     REVIEW OF SYSTEMS:    CONSTITUTIONAL: No fever   RESPIRATORY: No cough  CARDIOVASCULAR: No chest pain       Vital Signs Last 24 Hrs  T(C): 36.7 (2018 08:40), Max: 37.3 (2018 16:24)  T(F): 98.1 (2018 08:40), Max: 99.2 (2018 16:24)  HR: 69 (2018 08:40) (69 - 90)  BP: 110/62 (2018 08:40) (110/62 - 144/77)  BP(mean): 82 (2018 15:37) (82 - 82)  RR: 18 (2018 08:40) (16 - 21)  SpO2: 94% (2018 08:40) (94% - 98%)    PHYSICAL EXAM:    GENERAL: NAD   HEENT: PERRL, +EOMI  CHEST/LUNG: Clear to percussion bilaterally   HEART: S1S2+, Regular rate and rhythm   ABDOMEN: Soft, Nontender, Nondistended; Bowel sounds present       LABS:                        11.0   5.0   )-----------( 212      ( 2018 08:58 )             34.4     02-    141  |  102  |  38.0<H>  ----------------------------<  104  3.3<L>   |  26.0  |  1.51<H>    Ca    8.8      2018 08:43  Phos  2.8     02-  Mg     1.8     -        Urinalysis Basic - ( 2018 13:08 )    Color: Yellow / Appearance: Clear / S.015 / pH: x  Gluc: x / Ketone: Negative  / Bili: Negative / Urobili: Negative mg/dL   Blood: x / Protein: Negative mg/dL / Nitrite: Negative   Leuk Esterase: Negative / RBC: 6-10 /HPF / WBC 0-2   Sq Epi: x / Non Sq Epi: Occasional / Bacteria: Occasional          MEDICATIONS  (STANDING):  ascorbic acid 250 milliGRAM(s) Oral daily  calcium carbonate  625 mG + Vitamin D (OsCal 250 + D) 1 Tablet(s) Oral daily  carbidopa/levodopa  25/250 1 Tablet(s) Oral three times a day  enoxaparin Injectable 40 milliGRAM(s) SubCutaneous every 24 hours  fenofibrate Tablet 48 milliGRAM(s) Oral daily  finasteride 5 milliGRAM(s) Oral daily  gabapentin 300 milliGRAM(s) Oral three times a day  indapamide 2.5 milliGRAM(s) Oral every 24 hours  multivitamin 1 Tablet(s) Oral daily  oseltamivir 30 milliGRAM(s) Oral two times a day  pantoprazole    Tablet 40 milliGRAM(s) Oral two times a day before meals  potassium chloride    Tablet ER 40 milliEquivalent(s) Oral every 4 hours  pyridoxine 100 milliGRAM(s) Oral daily  tamsulosin 0.8 milliGRAM(s) Oral at bedtime    MEDICATIONS  (PRN):      RADIOLOGY & ADDITIONAL TESTS:

## 2018-02-21 NOTE — PROGRESS NOTE ADULT - ASSESSMENT
87 yo M with h/o Parkinson's disease, BPH, GERD, HLD, spinal stenosis here with influenza A and falls.
85 yo M with h/o Parkinson's disease, BPH, GERD, HLD, spinal stenosis here with influenza A and falls.
85 yo M with h/o Parkinson's disease, BPH, GERD, HLD, spinal stenosis here with influenza A and falls.

## 2018-02-21 NOTE — PROGRESS NOTE ADULT - ATTENDING COMMENTS
Dispo: Physical therapy recommending [facility].
Dispo: Physical therapy recommending [facility]. Social work consult pending. Occupational consult pending.
Dispo: Physical therapy recommending [facility]order[facility]

## 2018-03-03 RX ORDER — CIPROFLOXACIN LACTATE 400MG/40ML
1 VIAL (ML) INTRAVENOUS
Qty: 0 | Refills: 0 | COMMUNITY
Start: 2018-03-03 | End: 2018-03-07

## 2018-03-05 ENCOUNTER — INPATIENT (INPATIENT)
Facility: HOSPITAL | Age: 83
LOS: 6 days | Discharge: NURSING, REHAB OR LONG-TERM CARE | DRG: 871 | End: 2018-03-12
Attending: INTERNAL MEDICINE | Admitting: HOSPITALIST
Payer: MEDICARE

## 2018-03-05 VITALS
TEMPERATURE: 98 F | SYSTOLIC BLOOD PRESSURE: 105 MMHG | RESPIRATION RATE: 18 BRPM | DIASTOLIC BLOOD PRESSURE: 88 MMHG | OXYGEN SATURATION: 96 % | HEART RATE: 82 BPM

## 2018-03-05 LAB
ALBUMIN SERPL ELPH-MCNC: 3.1 G/DL — LOW (ref 3.3–5.2)
ALP SERPL-CCNC: 72 U/L — SIGNIFICANT CHANGE UP (ref 40–120)
ALT FLD-CCNC: 8 U/L — SIGNIFICANT CHANGE UP
ANION GAP SERPL CALC-SCNC: 20 MMOL/L — HIGH (ref 5–17)
AST SERPL-CCNC: 81 U/L — HIGH
BILIRUB SERPL-MCNC: 0.4 MG/DL — SIGNIFICANT CHANGE UP (ref 0.4–2)
BUN SERPL-MCNC: 92 MG/DL — HIGH (ref 8–20)
CALCIUM SERPL-MCNC: 9.4 MG/DL — SIGNIFICANT CHANGE UP (ref 8.6–10.2)
CHLORIDE SERPL-SCNC: 90 MMOL/L — LOW (ref 98–107)
CK MB CFR SERPL CALC: 20.1 NG/ML — HIGH (ref 0–6.7)
CK SERPL-CCNC: 1091 U/L — HIGH (ref 30–200)
CO2 SERPL-SCNC: 23 MMOL/L — SIGNIFICANT CHANGE UP (ref 22–29)
CREAT SERPL-MCNC: 5.44 MG/DL — HIGH (ref 0.5–1.3)
GLUCOSE SERPL-MCNC: 93 MG/DL — SIGNIFICANT CHANGE UP (ref 70–115)
HCT VFR BLD CALC: 35.2 % — LOW (ref 42–52)
HGB BLD-MCNC: 12.1 G/DL — LOW (ref 14–18)
INR BLD: 1.12 RATIO — SIGNIFICANT CHANGE UP (ref 0.88–1.16)
LACTATE BLDV-MCNC: 1.4 MMOL/L — SIGNIFICANT CHANGE UP (ref 0.5–2)
LIDOCAIN IGE QN: 18 U/L — LOW (ref 22–51)
LYMPHOCYTES # BLD AUTO: 4 % — LOW (ref 20–55)
MAGNESIUM SERPL-MCNC: 1.8 MG/DL — SIGNIFICANT CHANGE UP (ref 1.6–2.6)
MCHC RBC-ENTMCNC: 31.3 PG — HIGH (ref 27–31)
MCHC RBC-ENTMCNC: 34.4 G/DL — SIGNIFICANT CHANGE UP (ref 32–36)
MCV RBC AUTO: 91.2 FL — SIGNIFICANT CHANGE UP (ref 80–94)
MONOCYTES NFR BLD AUTO: 3 % — SIGNIFICANT CHANGE UP (ref 3–10)
NEUTROPHILS NFR BLD AUTO: 93 % — HIGH (ref 37–73)
PHOSPHATE SERPL-MCNC: 2.8 MG/DL — SIGNIFICANT CHANGE UP (ref 2.4–4.7)
PLAT MORPH BLD: NORMAL — SIGNIFICANT CHANGE UP
PLATELET # BLD AUTO: 223 K/UL — SIGNIFICANT CHANGE UP (ref 150–400)
POTASSIUM SERPL-MCNC: 4.1 MMOL/L — SIGNIFICANT CHANGE UP (ref 3.5–5.3)
POTASSIUM SERPL-SCNC: 4.1 MMOL/L — SIGNIFICANT CHANGE UP (ref 3.5–5.3)
PROCALCITONIN SERPL-MCNC: 181.33 NG/ML — HIGH (ref 0–0.04)
PROT SERPL-MCNC: 6.6 G/DL — SIGNIFICANT CHANGE UP (ref 6.6–8.7)
PROTHROM AB SERPL-ACNC: 12.4 SEC — SIGNIFICANT CHANGE UP (ref 9.8–12.7)
RBC # BLD: 3.86 M/UL — LOW (ref 4.6–6.2)
RBC # FLD: 13.7 % — SIGNIFICANT CHANGE UP (ref 11–15.6)
RBC BLD AUTO: NORMAL — SIGNIFICANT CHANGE UP
SODIUM SERPL-SCNC: 133 MMOL/L — LOW (ref 135–145)
TROPONIN T SERPL-MCNC: 0.08 NG/ML — HIGH (ref 0–0.06)
TSH SERPL-MCNC: 4.86 UIU/ML — HIGH (ref 0.27–4.2)
WBC # BLD: 35.2 K/UL — HIGH (ref 4.8–10.8)
WBC # FLD AUTO: 35.2 K/UL — HIGH (ref 4.8–10.8)

## 2018-03-05 PROCEDURE — 71045 X-RAY EXAM CHEST 1 VIEW: CPT | Mod: 26

## 2018-03-05 PROCEDURE — 71250 CT THORAX DX C-: CPT | Mod: 26

## 2018-03-05 PROCEDURE — 99291 CRITICAL CARE FIRST HOUR: CPT

## 2018-03-05 PROCEDURE — 74176 CT ABD & PELVIS W/O CONTRAST: CPT | Mod: 26

## 2018-03-05 RX ORDER — SODIUM BICARBONATE 1 MEQ/ML
50 SYRINGE (ML) INTRAVENOUS ONCE
Qty: 0 | Refills: 0 | Status: COMPLETED | OUTPATIENT
Start: 2018-03-05 | End: 2018-03-05

## 2018-03-05 RX ORDER — SODIUM CHLORIDE 9 MG/ML
3 INJECTION INTRAMUSCULAR; INTRAVENOUS; SUBCUTANEOUS ONCE
Qty: 0 | Refills: 0 | Status: COMPLETED | OUTPATIENT
Start: 2018-03-05 | End: 2018-03-05

## 2018-03-05 RX ORDER — SODIUM CHLORIDE 9 MG/ML
500 INJECTION INTRAMUSCULAR; INTRAVENOUS; SUBCUTANEOUS
Qty: 0 | Refills: 0 | Status: COMPLETED | OUTPATIENT
Start: 2018-03-05 | End: 2018-03-05

## 2018-03-05 RX ORDER — PIPERACILLIN AND TAZOBACTAM 4; .5 G/20ML; G/20ML
3.38 INJECTION, POWDER, LYOPHILIZED, FOR SOLUTION INTRAVENOUS ONCE
Qty: 0 | Refills: 0 | Status: COMPLETED | OUTPATIENT
Start: 2018-03-05 | End: 2018-03-05

## 2018-03-05 RX ADMIN — PIPERACILLIN AND TAZOBACTAM 200 GRAM(S): 4; .5 INJECTION, POWDER, LYOPHILIZED, FOR SOLUTION INTRAVENOUS at 23:12

## 2018-03-05 RX ADMIN — SODIUM CHLORIDE 2000 MILLILITER(S): 9 INJECTION INTRAMUSCULAR; INTRAVENOUS; SUBCUTANEOUS at 20:40

## 2018-03-05 RX ADMIN — SODIUM CHLORIDE 2000 MILLILITER(S): 9 INJECTION INTRAMUSCULAR; INTRAVENOUS; SUBCUTANEOUS at 20:39

## 2018-03-05 RX ADMIN — SODIUM CHLORIDE 3 MILLILITER(S): 9 INJECTION INTRAMUSCULAR; INTRAVENOUS; SUBCUTANEOUS at 20:37

## 2018-03-05 RX ADMIN — Medication 50 MILLIEQUIVALENT(S): at 23:12

## 2018-03-05 NOTE — ED PROVIDER NOTE - CRITICAL CARE PROVIDED
additional history taking/documentation/interpretation of diagnostic studies/direct patient care (not related to procedure)/conducted a detailed discussion of DNR status

## 2018-03-05 NOTE — ED PROVIDER NOTE - CARE PLAN
Principal Discharge DX:	Sepsis  Secondary Diagnosis:	Acute renal failure Principal Discharge DX:	Sepsis  Secondary Diagnosis:	Acute renal failure  Secondary Diagnosis:	Rhabdomyolysis

## 2018-03-05 NOTE — ED ADULT TRIAGE NOTE - CHIEF COMPLAINT QUOTE
PT BIBA for abnormal lab results, WBC 37.2 - pt had flu 2 weeks ago. Pt offers no complaints, is afebrile at this time.

## 2018-03-05 NOTE — ED PROVIDER NOTE - OBJECTIVE STATEMENT
85 y/o M pt with hx parkinson's, HTN, high cholesterol presents to ED with abnormal lab results. Two weeks ago, he was admitted to hospital with flu. Pt has been weak, fatigued, and has right shoulder pain which causes back pain when shoulder is lifted. He also has been having abdominal discomfort. As per daughter, pt was found to have abnormal white blood cells. His wife passed away this week and family states he has had decreased appetite. Denies dizziness, cough, vomiting. Pt is on Advil 3 times a day.  Dr. Ladinski

## 2018-03-06 DIAGNOSIS — D72.823 LEUKEMOID REACTION: ICD-10-CM

## 2018-03-06 DIAGNOSIS — M62.82 RHABDOMYOLYSIS: ICD-10-CM

## 2018-03-06 DIAGNOSIS — N17.9 ACUTE KIDNEY FAILURE, UNSPECIFIED: ICD-10-CM

## 2018-03-06 DIAGNOSIS — A41.9 SEPSIS, UNSPECIFIED ORGANISM: ICD-10-CM

## 2018-03-06 DIAGNOSIS — G20 PARKINSON'S DISEASE: ICD-10-CM

## 2018-03-06 LAB
ALBUMIN SERPL ELPH-MCNC: 2.5 G/DL — LOW (ref 3.3–5.2)
ALP SERPL-CCNC: 60 U/L — SIGNIFICANT CHANGE UP (ref 40–120)
ALT FLD-CCNC: <5 U/L — SIGNIFICANT CHANGE UP
ANION GAP SERPL CALC-SCNC: 16 MMOL/L — SIGNIFICANT CHANGE UP (ref 5–17)
APPEARANCE UR: ABNORMAL
AST SERPL-CCNC: 54 U/L — HIGH
BACTERIA # UR AUTO: ABNORMAL
BASOPHILS # BLD AUTO: 0 K/UL — SIGNIFICANT CHANGE UP (ref 0–0.2)
BASOPHILS NFR BLD AUTO: 1 % — SIGNIFICANT CHANGE UP (ref 0–2)
BILIRUB SERPL-MCNC: 0.3 MG/DL — LOW (ref 0.4–2)
BILIRUB UR-MCNC: NEGATIVE — SIGNIFICANT CHANGE UP
BUN SERPL-MCNC: 86 MG/DL — HIGH (ref 8–20)
C DIFF BY PCR RESULT: SIGNIFICANT CHANGE UP
C DIFF TOX GENS STL QL NAA+PROBE: SIGNIFICANT CHANGE UP
CALCIUM SERPL-MCNC: 8.5 MG/DL — LOW (ref 8.6–10.2)
CHLORIDE SERPL-SCNC: 100 MMOL/L — SIGNIFICANT CHANGE UP (ref 98–107)
CK MB CFR SERPL CALC: 11.2 NG/ML — HIGH (ref 0–6.7)
CK MB CFR SERPL CALC: 11.7 NG/ML — HIGH (ref 0–6.7)
CK MB CFR SERPL CALC: 7.8 NG/ML — HIGH (ref 0–6.7)
CK SERPL-CCNC: 479 U/L — HIGH (ref 30–200)
CK SERPL-CCNC: 591 U/L — HIGH (ref 30–200)
CK SERPL-CCNC: 660 U/L — HIGH (ref 30–200)
CO2 SERPL-SCNC: 22 MMOL/L — SIGNIFICANT CHANGE UP (ref 22–29)
COLOR SPEC: YELLOW — SIGNIFICANT CHANGE UP
CREAT SERPL-MCNC: 4.77 MG/DL — HIGH (ref 0.5–1.3)
DIFF PNL FLD: ABNORMAL
EOSINOPHIL # BLD AUTO: 0.2 K/UL — SIGNIFICANT CHANGE UP (ref 0–0.5)
EOSINOPHIL NFR BLD AUTO: 1 % — SIGNIFICANT CHANGE UP (ref 0–6)
EPI CELLS # UR: SIGNIFICANT CHANGE UP
GLUCOSE SERPL-MCNC: 99 MG/DL — SIGNIFICANT CHANGE UP (ref 70–115)
GLUCOSE UR QL: NEGATIVE MG/DL — SIGNIFICANT CHANGE UP
HCT VFR BLD CALC: 31.2 % — LOW (ref 42–52)
HGB BLD-MCNC: 10.7 G/DL — LOW (ref 14–18)
KETONES UR-MCNC: NEGATIVE — SIGNIFICANT CHANGE UP
LEUKOCYTE ESTERASE UR-ACNC: ABNORMAL
LYMPHOCYTES # BLD AUTO: 1.2 K/UL — SIGNIFICANT CHANGE UP (ref 1–4.8)
LYMPHOCYTES # BLD AUTO: 6 % — LOW (ref 20–55)
MCHC RBC-ENTMCNC: 31.3 PG — HIGH (ref 27–31)
MCHC RBC-ENTMCNC: 34.3 G/DL — SIGNIFICANT CHANGE UP (ref 32–36)
MCV RBC AUTO: 91.2 FL — SIGNIFICANT CHANGE UP (ref 80–94)
MONOCYTES # BLD AUTO: 1.1 K/UL — HIGH (ref 0–0.8)
MONOCYTES NFR BLD AUTO: 3 % — SIGNIFICANT CHANGE UP (ref 3–10)
NEUTROPHILS # BLD AUTO: 19.2 K/UL — HIGH (ref 1.8–8)
NEUTROPHILS NFR BLD AUTO: 80 % — HIGH (ref 37–73)
NEUTS BAND # BLD: 9 % — HIGH (ref 0–8)
NITRITE UR-MCNC: NEGATIVE — SIGNIFICANT CHANGE UP
PH UR: 5 — SIGNIFICANT CHANGE UP (ref 5–8)
PLAT MORPH BLD: NORMAL — SIGNIFICANT CHANGE UP
PLATELET # BLD AUTO: 196 K/UL — SIGNIFICANT CHANGE UP (ref 150–400)
POTASSIUM SERPL-MCNC: 3.3 MMOL/L — LOW (ref 3.5–5.3)
POTASSIUM SERPL-SCNC: 3.3 MMOL/L — LOW (ref 3.5–5.3)
PROT SERPL-MCNC: 5.2 G/DL — LOW (ref 6.6–8.7)
PROT UR-MCNC: 30 MG/DL
RAPID RVP RESULT: SIGNIFICANT CHANGE UP
RBC # BLD: 3.42 M/UL — LOW (ref 4.6–6.2)
RBC # FLD: 13.6 % — SIGNIFICANT CHANGE UP (ref 11–15.6)
RBC BLD AUTO: SIGNIFICANT CHANGE UP
RBC CASTS # UR COMP ASSIST: ABNORMAL /HPF (ref 0–4)
SODIUM SERPL-SCNC: 138 MMOL/L — SIGNIFICANT CHANGE UP (ref 135–145)
SP GR SPEC: 1.01 — SIGNIFICANT CHANGE UP (ref 1.01–1.02)
TROPONIN T SERPL-MCNC: 0.05 NG/ML — SIGNIFICANT CHANGE UP (ref 0–0.06)
TROPONIN T SERPL-MCNC: 0.06 NG/ML — SIGNIFICANT CHANGE UP (ref 0–0.06)
TROPONIN T SERPL-MCNC: 0.06 NG/ML — SIGNIFICANT CHANGE UP (ref 0–0.06)
UROBILINOGEN FLD QL: NEGATIVE MG/DL — SIGNIFICANT CHANGE UP
WBC # BLD: 22.1 K/UL — HIGH (ref 4.8–10.8)
WBC # FLD AUTO: 22.1 K/UL — HIGH (ref 4.8–10.8)
WBC UR QL: ABNORMAL

## 2018-03-06 PROCEDURE — 93010 ELECTROCARDIOGRAM REPORT: CPT

## 2018-03-06 PROCEDURE — 12345: CPT | Mod: NC

## 2018-03-06 PROCEDURE — 99223 1ST HOSP IP/OBS HIGH 75: CPT

## 2018-03-06 PROCEDURE — 76775 US EXAM ABDO BACK WALL LIM: CPT | Mod: 26

## 2018-03-06 PROCEDURE — 93306 TTE W/DOPPLER COMPLETE: CPT | Mod: 26

## 2018-03-06 RX ORDER — CARBIDOPA AND LEVODOPA 25; 100 MG/1; MG/1
1 TABLET ORAL THREE TIMES A DAY
Qty: 0 | Refills: 0 | Status: DISCONTINUED | OUTPATIENT
Start: 2018-03-06 | End: 2018-03-07

## 2018-03-06 RX ORDER — SODIUM CHLORIDE 9 MG/ML
1000 INJECTION INTRAMUSCULAR; INTRAVENOUS; SUBCUTANEOUS
Qty: 0 | Refills: 0 | Status: DISCONTINUED | OUTPATIENT
Start: 2018-03-06 | End: 2018-03-12

## 2018-03-06 RX ORDER — PANTOPRAZOLE SODIUM 20 MG/1
40 TABLET, DELAYED RELEASE ORAL
Qty: 0 | Refills: 0 | Status: DISCONTINUED | OUTPATIENT
Start: 2018-03-06 | End: 2018-03-12

## 2018-03-06 RX ORDER — PIPERACILLIN AND TAZOBACTAM 4; .5 G/20ML; G/20ML
2.25 INJECTION, POWDER, LYOPHILIZED, FOR SOLUTION INTRAVENOUS EVERY 8 HOURS
Qty: 0 | Refills: 0 | Status: DISCONTINUED | OUTPATIENT
Start: 2018-03-06 | End: 2018-03-07

## 2018-03-06 RX ORDER — ACETAMINOPHEN 500 MG
650 TABLET ORAL EVERY 6 HOURS
Qty: 0 | Refills: 0 | Status: DISCONTINUED | OUTPATIENT
Start: 2018-03-06 | End: 2018-03-12

## 2018-03-06 RX ORDER — TAMSULOSIN HYDROCHLORIDE 0.4 MG/1
0.4 CAPSULE ORAL AT BEDTIME
Qty: 0 | Refills: 0 | Status: DISCONTINUED | OUTPATIENT
Start: 2018-03-06 | End: 2018-03-07

## 2018-03-06 RX ORDER — FINASTERIDE 5 MG/1
5 TABLET, FILM COATED ORAL DAILY
Qty: 0 | Refills: 0 | Status: DISCONTINUED | OUTPATIENT
Start: 2018-03-06 | End: 2018-03-12

## 2018-03-06 RX ORDER — GABAPENTIN 400 MG/1
300 CAPSULE ORAL THREE TIMES A DAY
Qty: 0 | Refills: 0 | Status: DISCONTINUED | OUTPATIENT
Start: 2018-03-06 | End: 2018-03-07

## 2018-03-06 RX ORDER — POTASSIUM CHLORIDE 20 MEQ
40 PACKET (EA) ORAL ONCE
Qty: 0 | Refills: 0 | Status: COMPLETED | OUTPATIENT
Start: 2018-03-06 | End: 2018-03-06

## 2018-03-06 RX ORDER — ASCORBIC ACID 60 MG
250 TABLET,CHEWABLE ORAL DAILY
Qty: 0 | Refills: 0 | Status: DISCONTINUED | OUTPATIENT
Start: 2018-03-06 | End: 2018-03-12

## 2018-03-06 RX ORDER — HEPARIN SODIUM 5000 [USP'U]/ML
5000 INJECTION INTRAVENOUS; SUBCUTANEOUS EVERY 12 HOURS
Qty: 0 | Refills: 0 | Status: DISCONTINUED | OUTPATIENT
Start: 2018-03-06 | End: 2018-03-12

## 2018-03-06 RX ADMIN — CARBIDOPA AND LEVODOPA 1 TABLET(S): 25; 100 TABLET ORAL at 22:19

## 2018-03-06 RX ADMIN — GABAPENTIN 300 MILLIGRAM(S): 400 CAPSULE ORAL at 05:37

## 2018-03-06 RX ADMIN — FINASTERIDE 5 MILLIGRAM(S): 5 TABLET, FILM COATED ORAL at 13:02

## 2018-03-06 RX ADMIN — SODIUM CHLORIDE 75 MILLILITER(S): 9 INJECTION INTRAMUSCULAR; INTRAVENOUS; SUBCUTANEOUS at 05:37

## 2018-03-06 RX ADMIN — PANTOPRAZOLE SODIUM 40 MILLIGRAM(S): 20 TABLET, DELAYED RELEASE ORAL at 05:37

## 2018-03-06 RX ADMIN — HEPARIN SODIUM 5000 UNIT(S): 5000 INJECTION INTRAVENOUS; SUBCUTANEOUS at 05:37

## 2018-03-06 RX ADMIN — SODIUM CHLORIDE 75 MILLILITER(S): 9 INJECTION INTRAMUSCULAR; INTRAVENOUS; SUBCUTANEOUS at 10:15

## 2018-03-06 RX ADMIN — CARBIDOPA AND LEVODOPA 1 TABLET(S): 25; 100 TABLET ORAL at 13:03

## 2018-03-06 RX ADMIN — GABAPENTIN 300 MILLIGRAM(S): 400 CAPSULE ORAL at 22:19

## 2018-03-06 RX ADMIN — TAMSULOSIN HYDROCHLORIDE 0.4 MILLIGRAM(S): 0.4 CAPSULE ORAL at 22:19

## 2018-03-06 RX ADMIN — CARBIDOPA AND LEVODOPA 1 TABLET(S): 25; 100 TABLET ORAL at 05:37

## 2018-03-06 RX ADMIN — GABAPENTIN 300 MILLIGRAM(S): 400 CAPSULE ORAL at 13:03

## 2018-03-06 RX ADMIN — Medication 1 TABLET(S): at 13:03

## 2018-03-06 RX ADMIN — Medication 250 MILLIGRAM(S): at 13:03

## 2018-03-06 RX ADMIN — HEPARIN SODIUM 5000 UNIT(S): 5000 INJECTION INTRAVENOUS; SUBCUTANEOUS at 17:31

## 2018-03-06 RX ADMIN — PIPERACILLIN AND TAZOBACTAM 200 GRAM(S): 4; .5 INJECTION, POWDER, LYOPHILIZED, FOR SOLUTION INTRAVENOUS at 22:20

## 2018-03-06 RX ADMIN — Medication 40 MILLIEQUIVALENT(S): at 15:49

## 2018-03-06 RX ADMIN — PIPERACILLIN AND TAZOBACTAM 200 GRAM(S): 4; .5 INJECTION, POWDER, LYOPHILIZED, FOR SOLUTION INTRAVENOUS at 13:10

## 2018-03-06 NOTE — CONSULT NOTE ADULT - ASSESSMENT
Assessment and Plan:    Assessment:  		  UTI, unspecified site, with hematuria, initial encounter, will keep on zosyn,  renal dosing.  follow cultures.     Sepsis, unspecified organism , likely source uti.  follow blood , urine cultures. will get ID consult.     JETT, likely combination of poor po intake,  Levaquin and was on diuretic as well. will give gentle hydration, renal sonogram, nephrology consult  dr. Townsend.    Dehydration. pt. is clinically and labs indicate dehydration will hold indapamide. gentle iv fluids.     Elevated trop, likely renally related, will trend, no cp. I spoke to Dr Lewis ([phone]) patients cardiologist office Patients last ECHO 2013 EF 65%, AV calcification, mild TVR, normal wall motion, will f/u repeat today    Rhabdomyolysis, iv hydration, follow repeat labs.      disease, levodopa/ carbidopa. renally dosed. Assessment and Plan:    Assessment:  		  UTI, unspecified site, with hematuria, initial encounter, will keep on zosyn,  renal dosing.  follow cultures.     Sepsis, unspecified organism , likely source uti.  follow blood , urine cultures. will get ID consult.     JETT, likely combination of poor po intake,  Levaquin and was on diuretic as well. will give gentle hydration, renal sonogram, nephrology consult  dr. Townsend.    Dehydration. pt. is clinically and labs indicate dehydration will hold indapamide. gentle iv fluids.     Elevated trop, likely renally related, will trend, no cp. I spoke to Dr Lewis ([phone]) patients cardiologist office Patients last ECHO 2013 EF 65%, AV calcification, mild TVR, normal wall motion, will f/u repeat today    Rhabdomyolysis, iv hydration, follow repeat labs.     Parkinson's disease, levodopa/ carbidopa. renally dosed.

## 2018-03-06 NOTE — H&P ADULT - NSHPPHYSICALEXAM_GEN_ALL_CORE
General: An elderly  male lying in bed in NAD.   HEENT: AT, NC. PERRL. intact EOM. oral mucosa and lips are dry. no throat erythema.   Neck: supple. no JVD.   Chest: CTA bilaterally  Heart: normal S1,S2. RRR. no heart murmur.  Abdomen: soft. non-tender. obese,  + BS.  Rectal : deferred at this point.   Ext: no sig. edema of ext. noted. no calf tenderness.   Neuro: pt. is alert, awake, has somewhat flat affect. non focal. moves ext. follows commands.   Skin: warm and dry. no cyanosis. no diaphoresis.   Psychiatric : somewhat flat affect. no acute distress. no SI/HI.

## 2018-03-06 NOTE — CONSULT NOTE ADULT - PROBLEM SELECTOR PROBLEM 1
Acute renal failure, unspecified acute renal failure type R/O Urinary tract infection without hematuria, site unspecified

## 2018-03-06 NOTE — PROGRESS NOTE ADULT - SUBJECTIVE AND OBJECTIVE BOX
JAH WARD     Chief Complaint: Patient is a 86y old  Male who presents with a chief complaint of ABNORMAL LABS. (06 Mar 2018 02:53)      PAST MEDICAL & SURGICAL HISTORY:  Noel esophagus  Spinal stenosis  Parkinson disease  Hypertrophy (Benign) of Prostate  High Cholesterol  History of Hypertension  Bilateral Cataracts  History of Appendectomy  Retina: surgery      HPI/OVERNIGHT EVENTS: Patient lying in bed sleeping, arousable.    MEDICATIONS  (STANDING):  ascorbic acid 250 milliGRAM(s) Oral daily  carbidopa/levodopa  25/100 1 Tablet(s) Oral three times a day  finasteride 5 milliGRAM(s) Oral daily  gabapentin 300 milliGRAM(s) Oral three times a day  heparin  Injectable 5000 Unit(s) SubCutaneous every 12 hours  multivitamin 1 Tablet(s) Oral daily  pantoprazole    Tablet 40 milliGRAM(s) Oral before breakfast  piperacillin/tazobactam IVPB. 2.25 Gram(s) IV Intermittent every 8 hours  potassium chloride    Tablet ER 40 milliEquivalent(s) Oral once  sodium chloride 0.9%. 1000 milliLiter(s) (75 mL/Hr) IV Continuous <Continuous>  tamsulosin 0.4 milliGRAM(s) Oral at bedtime      Vital Signs Last 24 Hrs  T(C): 36.4 (06 Mar 2018 10:18), Max: 36.9 (06 Mar 2018 02:53)  T(F): 97.6 (06 Mar 2018 10:18), Max: 98.5 (06 Mar 2018 02:53)  HR: 75 (06 Mar 2018 10:18) (75 - 82)  BP: 104/56 (06 Mar 2018 10:18) (92/50 - 115/79)  BP(mean): --  RR: 18 (06 Mar 2018 10:18) (18 - 20)  SpO2: 98% (06 Mar 2018 10:18) (93% - 98%)    PHYSICAL EXAM:  Constitutional: NAD, well-groomed, well-developed  HEENT: PERRLA, EOMI, Normal Hearing, MMM  Neck: No LAD, No JVD  Back: Normal spine flexure, No CVA tenderness  Respiratory: CTAB Cardiovascular: S1 and S2, RRR, no M/G/R  Gastrointestinal: BS+, soft, NT/ND  Extremities: No peripheral edema  Vascular: 2+ peripheral pulses  Neurological: A/O x 3,   Ramirez in place    CAPILLARY BLOOD GLUCOSE    LABS:                        10.7   22.1  )-----------( 196      ( 06 Mar 2018 12:05 )             31.2     03-06    138  |  100  |  86.0<H>  ----------------------------<  99  3.3<L>   |  22.0  |  4.77<H>    Ca    8.5<L>      06 Mar 2018 12:05  Phos  2.8     03-  Mg     1.8     -    TPro  5.2<L>  /  Alb  2.5<L>  /  TBili  0.3<L>  /  DBili  x   /  AST  54<H>  /  ALT  <5  /  AlkPhos  60  03-06    PT/INR - ( 05 Mar 2018 20:10 )   PT: 12.4 sec;   INR: 1.12 ratio           Urinalysis Basic - ( 06 Mar 2018 00:13 )    Color: Yellow / Appearance: very cloudy / S.010 / pH: x  Gluc: x / Ketone: Negative  / Bili: Negative / Urobili: Negative mg/dL   Blood: x / Protein: 30 mg/dL / Nitrite: Negative   Leuk Esterase: Moderate / RBC: 25-50 /HPF / WBC 6-10   Sq Epi: x / Non Sq Epi: Occasional / Bacteria: Few        RADIOLOGY & ADDITIONAL TESTS:

## 2018-03-06 NOTE — CONSULT NOTE ADULT - ATTENDING COMMENTS
Patient seen and examined by me. Above note reviewed.    Patients elevated troponin is secondary to demand ischemia and JETT.  Patient has no complaints suggestive of angina.  Patients echo results noted, no regional wall motion abnormality.    NO NEED FOR FURTHER CARDIAC WORK UP.  PATIENT TO F/U WITH HIS CARDIOLOGIST AFTER HOSPITAL DISCHARGE.  I WILL SIGN OFF.

## 2018-03-06 NOTE — ED ADULT NURSE REASSESSMENT NOTE - NS ED NURSE REASSESS COMMENT FT1
spoke to family at length regarding patho of condition, and awaiting adm MD to evaluate. Pt stable urine output <30CC/hr. will continue to monitorr

## 2018-03-06 NOTE — CONSULT NOTE ADULT - ASSESSMENT
This 86 y.o. man here for WBC elevation, no fevers in ER, but positive UA, suspect UTI. This 86 y.o. man here for WBC elevation, no fevers in ER, but positive UA, suspect UTI.   ALSO WITH DIARRHEA. WILL NEED TO R/O C DIFF IN VIEW OF RECENT LEVAQUIN USE.

## 2018-03-06 NOTE — PROGRESS NOTE ADULT - ASSESSMENT
86 year old Noel esophagus, Spinal stenosis, Parkinson disease, Hypertrophy (Benign) of Prostate, High Cholesterol  History of Hypertension, Bilateral Cataracts, History of Appendectomy, Retina: surgery. Patient has been back and forth from skilled nursing facility to the hospital several times over months. Patient now with Acute renal failure and urinary tract infection.

## 2018-03-06 NOTE — CONSULT NOTE ADULT - ASSESSMENT
1) ATN  2) UTI  3) Hypokalemia  4) Anemia  5) UTI    Patient likely prerenal from UTI; decreased po; further evidence is his hypokalemia and low phosphorus;  This in combination with his NSAID use over the past week has likely tipped him into ATN given his significant rise in SCr  Would check UA; urine sodium, chloride, osm (ordered)  Check US renal  Can start NS @ 75cc/hr for now  WBC 22k; ID following; sending C diff per ID  Strict IO; tovar in place  Avoid nephrotoxic agents ie NSAIDs  Will follow  dw Dr Arndt 1) ATN  2) UTI  3) Hypokalemia  4) Anemia  5) UTI  6) Rhabdo    Patient likely prerenal from UTI; decreased po; further evidence is his hypokalemia and low phosphorus;  This in combination with his NSAID use over the past week has likely tipped him into ATN given his significant rise in SCr  Would check UA; urine sodium, chloride, osm (ordered)  Check US renal  Can start NS @ 75cc/hr for now  WBC 22k; ID following; sending C diff per ID  Strict IO; tovar in place  Avoid nephrotoxic agents ie NSAIDs  Will follow  dw Dr Arndt

## 2018-03-06 NOTE — ED ADULT NURSE NOTE - OBJECTIVE STATEMENT
87y/o male sent from [facility] for lab normal lab values. Pt AOX4, resp even unlabored, recently D'C and sent to NH for weakness. Pt afebrile at this time, VSS, Pt states to have difficulty urinating. Abd soft, non distended, Dr. bryant at bedside to evaluate. will continue to monitor

## 2018-03-06 NOTE — CONSULT NOTE ADULT - SUBJECTIVE AND OBJECTIVE BOX
[facility] DIVISION OF KIDNEY DISEASES AND HYPERTENSION -- INITIAL CONSULT NOTE  --------------------------------------------------------------------------------  HPI:  86 year old male with past medical history of spinal stenosis; Parkinson's ; BPH, HTN; Noel's esophagus; noted to have UTI in rehab center; started on levaquin 3/3/18. Patient accompanied by his son who provides history; states he took advil for the past week as well. Pt noted to have very high white blood cell count; tovar placed; making urine. Patient most recently had flu in Feb 2018; treated at [facility]. No other complaints; lying in bed in NAD.    PAST HISTORY  --------------------------------------------------------------------------------  PAST MEDICAL & SURGICAL HISTORY:  Noel esophagus  Spinal stenosis  Parkinson disease  Hypertrophy (Benign) of Prostate  High Cholesterol  History of Hypertension  Bilateral Cataracts  History of Appendectomy  Retina: surgery    FAMILY HISTORY:  No pertinent family history in first degree relatives    PAST SOCIAL HISTORY:    ALLERGIES & MEDICATIONS  --------------------------------------------------------------------------------  Allergies    aspirin (Unknown)    Intolerances      Standing Inpatient Medications  ascorbic acid 250 milliGRAM(s) Oral daily  carbidopa/levodopa  25/100 1 Tablet(s) Oral three times a day  finasteride 5 milliGRAM(s) Oral daily  gabapentin 300 milliGRAM(s) Oral three times a day  heparin  Injectable 5000 Unit(s) SubCutaneous every 12 hours  multivitamin 1 Tablet(s) Oral daily  pantoprazole    Tablet 40 milliGRAM(s) Oral before breakfast  piperacillin/tazobactam IVPB. 2.25 Gram(s) IV Intermittent every 8 hours  sodium chloride 0.9%. 1000 milliLiter(s) IV Continuous <Continuous>  tamsulosin 0.4 milliGRAM(s) Oral at bedtime    PRN Inpatient Medications  acetaminophen   Tablet 650 milliGRAM(s) Oral every 6 hours PRN      REVIEW OF SYSTEMS  --------------------------------------------------------------------------------  Gen: No weight changes, fatigue, fevers/chills, weakness  Skin: No rashes  Head/Eyes/Ears/Mouth: No headache; Normal hearing; Normal vision w/o blurriness; No sinus pain/discomfort, sore throat  Respiratory: No dyspnea, cough, wheezing, hemoptysis  CV: No chest pain, PND, orthopnea  GI: No abdominal pain, diarrhea, constipation, nausea, vomiting, melena, hematochezia  : No increased frequency, dysuria, hematuria, nocturia  MSK: No joint pain/swelling; no back pain; no edema  Neuro: No dizziness/lightheadedness, weakness, seizures, numbness, tingling  Heme: No easy bruising or bleeding  Endo: No heat/cold intolerance  Psych: No significant nervousness, anxiety, stress, depression    All other systems were reviewed and are negative, except as noted.    VITALS/PHYSICAL EXAM  --------------------------------------------------------------------------------  T(C): 36.4 (03-06-18 @ 10:18), Max: 36.9 (03-06-18 @ 02:53)  HR: 75 (03-06-18 @ 10:18) (75 - 82)  BP: 104/56 (03-06-18 @ 10:18) (92/50 - 115/79)  RR: 18 (03-06-18 @ 10:18) (18 - 20)  SpO2: 98% (03-06-18 @ 10:18) (93% - 98%)  Wt(kg): --  Height (cm): 165.1 (03-06-18 @ 05:42)  Weight (kg): 91.6 (03-06-18 @ 05:42)  BMI (kg/m2): 33.6 (03-06-18 @ 05:42)  BSA (m2): 1.99 (03-06-18 @ 05:42)      Physical Exam:  	Gen: NAD  	HEENT: PERRL, supple neck, clear oropharynx  	Pulm: CTA B/L  	CV: RRR, S1S2; no rub  	Back: No spinal or CVA tenderness; no sacral edema  	Abd: +BS, soft, nontender/nondistended  	: No suprapubic tenderness  	UE: Warm, FROM, no clubbing, intact strength; no edema; no asterixis  	LE: Warm, FROM, no clubbing, intact strength; no edema  	Neuro: No focal deficits, intact gait  	Skin: Warm, without rashes      LABS/STUDIES  --------------------------------------------------------------------------------              10.7   22.1  >-----------<  196      [03-06-18 @ 12:05]              31.2     138  |  100  |  86.0  ----------------------------<  99      [03-06-18 @ 12:05]  3.3   |  22.0  |  4.77        Ca     8.5     [03-06-18 @ 12:05]      Mg     1.8     [03-05-18 @ 20:10]      Phos  2.8     [03-05-18 @ 20:10]    TPro  5.2  /  Alb  2.5  /  TBili  0.3  /  DBili  x   /  AST  54  /  ALT  <5  /  AlkPhos  60  [03-06-18 @ 12:05]    PT/INR: PT 12.4 , INR 1.12       [03-05-18 @ 20:10]    Troponin 0.06      [03-06-18 @ 12:05]        [03-06-18 @ 12:05]    Creatinine Trend:  SCr 4.77 [03-06 @ 12:05]  SCr 5.44 [03-05 @ 20:10]  SCr 1.48 [02-21 @ 08:24]  SCr 1.39 [02-20 @ 08:31]  SCr 1.51 [02-19 @ 08:43]    Urinalysis - [03-06-18 @ 00:13]      Color Yellow / Appearance very cloudy / SG 1.010 / pH 5.0      Gluc Negative / Ketone Negative  / Bili Negative / Urobili Negative       Blood Large / Protein 30 / Leuk Est Moderate / Nitrite Negative      RBC 25-50 / WBC 6-10 / Hyaline  / Gran  / Sq Epi  / Non Sq Epi Occasional / Bacteria Few      TSH 4.86      [03-05-18 @ 20:10]

## 2018-03-06 NOTE — CONSULT NOTE ADULT - SUBJECTIVE AND OBJECTIVE BOX
NPP INFECTIOUS DISEASES AND INTERNAL MEDICINE at [place]  =======================================================  Antwon Root MD [facility]   Cornelius Woods MD  Diplomates American Board of Internal Medicine and Infectious Diseases  =======================================================    [id number]  JAH WARD is a 86y  Male   This 86 y.o. Man with  Parkinson, HLD, HTN, resident of rehab facility, sent here for abnormal labs, wbc was high.  Per admission note, recently on Levaquin, for presumed UTI with intermittent dysuria.   Recent [facility] admission in 2018 for influenza.    =======================================================  Past Medical & Surgical Hx:  =====================  PAST MEDICAL & SURGICAL HISTORY:  Noel esophagus  Spinal stenosis  Parkinson disease  Hypertrophy (Benign) of Prostate  High Cholesterol  History of Hypertension  Bilateral Cataracts  History of Appendectomy  Retina: surgery      Problem List:  ==========  HEALTH ISSUES - PROBLEM Dx:    Social Hx:  =======  no toxic habits currently      FAMILY HISTORY:  No pertinent family history in first degree relatives      Allergies  aspirin (Unknown)  Intolerances      MEDICATIONS  (STANDING):  ascorbic acid 250 milliGRAM(s) Oral daily  carbidopa/levodopa  25/100 1 Tablet(s) Oral three times a day  finasteride 5 milliGRAM(s) Oral daily  gabapentin 300 milliGRAM(s) Oral three times a day  heparin  Injectable 5000 Unit(s) SubCutaneous every 12 hours  multivitamin 1 Tablet(s) Oral daily  pantoprazole    Tablet 40 milliGRAM(s) Oral before breakfast  piperacillin/tazobactam IVPB. 2.25 Gram(s) IV Intermittent every 8 hours  sodium chloride 0.9%. 1000 milliLiter(s) (75 mL/Hr) IV Continuous <Continuous>  tamsulosin 0.4 milliGRAM(s) Oral at bedtime    MEDICATIONS  (PRN):  acetaminophen   Tablet 650 milliGRAM(s) Oral every 6 hours PRN For Temp greater than or equal to 38 C (100.4 F)        =======================================================  REVIEW OF SYSTEMS:  Constitutional: No fever, No chills, No sweats.  Eye: No icterus, No double vision.  Ear/Nose/Mouth/Throat: No nasal congestion, No sore throat.  Respiratory: No shortness of breath, No cough, No sputum production, No wheezing.  Cardiovascular: No chest pain, No palpitations, No syncope.  Gastrointestinal: No nausea, No vomiting, No diarrhea, No abdominal pain.  Genitourinary: No dysuria, No hematuria, No change in urine stream.  Hematology/Lymphatics: No bleeding tendency.  Endocrine: No excessive thirst, No polyuria.  Immunologic: No malaise.  Musculoskeletal: No back pain, No neck pain, No joint pain, No muscle pain.  Integumentary: No rash, No pruritus, No skin lesion.  Neurologic: No numbness, No tingling, No headache.  Psychiatric: No depression.    =======================================================  Physical Exam:  ============  Vital Signs Last 24 Hrs  T(C): 36.4 (06 Mar 2018 10:18), Max: 36.9 (06 Mar 2018 02:53)  T(F): 97.6 (06 Mar 2018 10:18), Max: 98.5 (06 Mar 2018 02:53)  HR: 75 (06 Mar 2018 10:18) (75 - 82)  BP: 104/56 (06 Mar 2018 10:18) (92/50 - 115/79)  R: 18 (06 Mar 2018 10:18) (18 - 20)  SpO2: 98% (06 Mar 2018 10:18) (93% - 98%)  Height (cm): 165.1 ( @ 05:42)  Weight (kg): 91.6 ( @ 05:42)  BMI (kg/m2): 33.6 ( @ 05:42)  BSA (m2): 1.99 ( @ 05:42)      General: Alert and oriented, No acute distress.  Eye: Pupils are equal, round and reactive to light, Extraocular movements are intact, Normal conjunctiva.  HENT: Normocephalic, Oral mucosa is moist, No pharyngeal erythema, No sinus tenderness.  Neck: Supple, No lymphadenopathy.  Respiratory: Lungs are clear to auscultation, Respirations are non-labored.  Cardiovascular: Normal rate, Regular rhythm, No murmur, Good pulses equal in all extremities, No edema.  Gastrointestinal: Soft, Non-tender, Non-distended, Normal bowel sounds.  Genitourinary: No costovertebral angle tenderness.  Lymphatics: No lymphadenopathy neck, axilla, groin.  Musculoskeletal: Normal range of motion, Normal strength.  Integumentary: No rash.  Neurologic: Alert, Oriented, No focal deficits, Cranial Nerves II-XII are grossly intact.  Psychiatric: Appropriate mood & affect.      =======================================================  Labs:  ====    Labs:      133<L>  |  90<L>  |  92.0<H>  ----------------------------<  93  4.1   |  23.0  |  5.44<H>    Ca    9.4      05 Mar 2018 20:10  Phos  2.8       Mg     1.8         TPro  6.6  /  Alb  3.1<L>  /  TBili  0.4  /  DBili  x   /  AST  81<H>  /  ALT  8   /  AlkPhos  72                            12.1   35.2  )-----------( 223      ( 05 Mar 2018 20:10 )             35.2       PT/INR - ( 05 Mar 2018 20:10 )   PT: 12.4 sec;   INR: 1.12 ratio           Urinalysis Basic - ( 06 Mar 2018 00:13 )    Color: Yellow / Appearance: very cloudy / S.010 / pH: x  Gluc: x / Ketone: Negative  / Bili: Negative / Urobili: Negative mg/dL   Blood: x / Protein: 30 mg/dL / Nitrite: Negative   Leuk Esterase: Moderate / RBC: 25-50 /HPF / WBC 6-10   Sq Epi: x / Non Sq Epi: Occasional / Bacteria: Few      LIVER FUNCTIONS - ( 05 Mar 2018 20:10 )  Alb: 3.1 g/dL / Pro: 6.6 g/dL / ALK PHOS: 72 U/L / ALT: 8 U/L / AST: 81 U/L / GGT: x           CARDIAC MARKERS ( 05 Mar 2018 20:10 )  x     / 0.08 ng/mL / 1091 U/L / x     / 20.1 ng/mL             RECENT CULTURES:   @ 05:47      RVP  NotDetec      =-==================       EXAM:  XR CHEST AP OR PA 1V                          PROCEDURE DATE:  2018          INTERPRETATION:  Chest radiograph (one view)     CPT 35081    CLINICAL INFORMATION:  Patient is unable to communicate.  Short of   breath.     TECHNIQUE:  Single frontal view of the chest was obtained.    FINDINGS:  Prior study dated 2018 was available for review.    The lungs are clear.  No pleural abnormality is seen.      The heart and mediastinum appear intact.  Hiatal hernia is noted. There   is partial visualization of bilateral shoulder replacements.      IMPRESSION: No gross consolidation is seen.   Hiatal hernia noted.                 TYLER BENJAMIN M.D., ATTENDING RADIOLOGIST  This document has been electronically signed. Mar  6 2018  9:46AM                ====================       EXAM:  CT ABDOMEN AND PELVIS                         EXAM:  CT CHEST                          PROCEDURE DATE:  2018          INTERPRETATION:  CT CHEST, ABDOMEN AND PELVIS WITHOUT CONTRAST    INDICATION: Sepsis.    TECHNIQUE: Noncontrast CT of the chest, abdomen and pelvis.     COMPARISON: None.    FINDINGS:    ABSENCE OF INTRAVENOUS CONTRAST LIMITS EVALUATION FOR TRAUMATIC INJURY,   FOCAL LESIONS, NEOPLASM, AND VASCULAR PATHOLOGY.    Thorax:  Lines and tubes: None.  Airways: Tracheobronchial tree is patent.  Lungs: No pneumonia, pulmonary edema, or dominant masses.  Mediastinum and lymph nodes: No mass or hemorrhage. No worrisome   mediastinal adenopathy. No worrisome hilar or axillary adenopathy.  Heart: Normal size. No pericardial effusion.  Vessels: Normal size.    Liver: No suspicious lesions.      Biliary: No dilatation. Cholelithiasis.  Spleen: No suspicious lesions.      Pancreas: No inflammatory changes or ductal dilatation.      Adrenals: Normal.      Kidneys: No hydronephrosis or solid mass.      Aorta and IVC: Normal caliber. Moderate atherosclerotic disease of the   aorta and its branches.    GI tract: No evidence of small bowel obstruction. No wall thickening or   inflammatory changes.     Large hiatal hernia.    Peritoneum/retroperitoneum and mesentery: No free air. No organized fluid   collection. No adenopathy.        Pelvic organs/Bladder: No pelvic masses. Bladder is normal.        Abdominal wall: Fat-containing left inguinal hernia.  Bones and soft tissues: No destructive lesion. Multilevel degenerative   changes of the spine. Bilateral shoulder arthroplasties noted. Nodular   soft tissue density under the left nipple.    IMPRESSION:    No specific evidence of infection in the chest, abdomen or pelvis.    Nodular soft tissue density under the left nipple may reflect   gynecomastia, correlate with clinical examination.      GRABIEL ECKERT M.D., ATTENDING RADIOLOGIST  This document has been electronically signed. Mar  5 2018 10:52PM NPP INFECTIOUS DISEASES AND INTERNAL MEDICINE at [place]  =======================================================  Antwon Root MD [facility]   Cornelius Woods MD  Diplomates American Board of Internal Medicine and Infectious Diseases  =======================================================    [id number]  JAH WARD is a 86y  Male   This 86 y.o. Man with  Parkinson, HLD, HTN, resident of rehab facility, sent here for abnormal labs, wbc was high.  Per admission note, recently on Levaquin, for presumed UTI with intermittent dysuria.   Recent [facility] admission in 2018 for influenza.    patient's daughter provided information.   patient reports aches in all his joints/ hips.   daughter said that physical therapy was back to back several hours, leading to fatigue.  he is uncomfortable in his bed there.  patient can not comment on color or odor of urine as he had bad vision and sense of smell.   he denies recent falls - last fall was 4 to 6 weeks ago. no falls at SNF.     =======================================================  Past Medical & Surgical Hx:  =====================  PAST MEDICAL & SURGICAL HISTORY:  Noel esophagus  Spinal stenosis  Parkinson disease  Hypertrophy (Benign) of Prostate  High Cholesterol  History of Hypertension  Bilateral Cataracts  History of Appendectomy  Retina: surgery    Problem List:  ==========  HEALTH ISSUES - PROBLEM Dx:    Social Hx:  =======  no toxic habits currently      FAMILY HISTORY:  No pertinent family history in first degree relatives      Allergies  aspirin (Unknown)  Intolerances      MEDICATIONS  (STANDING):  ascorbic acid 250 milliGRAM(s) Oral daily  carbidopa/levodopa  25/100 1 Tablet(s) Oral three times a day  finasteride 5 milliGRAM(s) Oral daily  gabapentin 300 milliGRAM(s) Oral three times a day  heparin  Injectable 5000 Unit(s) SubCutaneous every 12 hours  multivitamin 1 Tablet(s) Oral daily  pantoprazole    Tablet 40 milliGRAM(s) Oral before breakfast  piperacillin/tazobactam IVPB. 2.25 Gram(s) IV Intermittent every 8 hours  sodium chloride 0.9%. 1000 milliLiter(s) (75 mL/Hr) IV Continuous <Continuous>  tamsulosin 0.4 milliGRAM(s) Oral at bedtime    MEDICATIONS  (PRN):  acetaminophen   Tablet 650 milliGRAM(s) Oral every 6 hours PRN For Temp greater than or equal to 38 C (100.4 F)        =======================================================  REVIEW OF SYSTEMS:  as above  all other ROS negative    =======================================================  Physical Exam:  ============  Vital Signs Last 24 Hrs  T(C): 36.4 (06 Mar 2018 10:18), Max: 36.9 (06 Mar 2018 02:53)  T(F): 97.6 (06 Mar 2018 10:18), Max: 98.5 (06 Mar 2018 02:53)  HR: 75 (06 Mar 2018 10:18) (75 - 82)  BP: 104/56 (06 Mar 2018 10:18) (92/50 - 115/79)  R: 18 (06 Mar 2018 10:18) (18 - 20)  SpO2: 98% (06 Mar 2018 10:18) (93% - 98%)  Height (cm): 165.1 ( @ 05:42)  Weight (kg): 91.6 ( @ 05:42)  BMI (kg/m2): 33.6 ( @ 05:42)  BSA (m2): 1.99 ( @ 05:42)      General: Alert and oriented, No acute distress.   Eye: Pupils are equal, round and reactive to light, Extraocular movements are intact, Normal conjunctiva.  HENT: Normocephalic, Oral mucosa is moist, No pharyngeal erythema, No sinus tenderness.  Neck: Supple,    Respiratory: Lungs are clear to auscultation anteriorly, Respirations are non-labored.  Cardiovascular: Normal rate, Regular rhythm, No murmur, Good pulses equal in all extremities, No edema.  Gastrointestinal: Soft, Non-tender, Non-distended, Normal bowel sounds.  LIQUID STOOL IN DIAPER  Genitourinary: No costovertebral angle tenderness. FOX IN PLACE, COPIOUS SEDIMENT IN TUBING  Lymphatics: No lymphadenopathy neck, axilla, groin.  Musculoskeletal: Normal range of motion, Normal strength.  Integumentary: No rash.  Neurologic: Alert, Oriented,  X 1-2 ;  Cranial Nerves II-XII are grossly intact.  Psychiatric: Appropriate mood & affect.      =======================================================  Labs:  ====    Labs:      133<L>  |  90<L>  |  92.0<H>  ----------------------------<  93  4.1   |  23.0  |  5.44<H>    Ca    9.4      05 Mar 2018 20:10  Phos  2.8       Mg     1.8         TPro  6.6  /  Alb  3.1<L>  /  TBili  0.4  /  DBili  x   /  AST  81<H>  /  ALT  8   /  AlkPhos  72                            12.1   35.2  )-----------( 223      ( 05 Mar 2018 20:10 )             35.2       PT/INR - ( 05 Mar 2018 20:10 )   PT: 12.4 sec;   INR: 1.12 ratio           Urinalysis Basic - ( 06 Mar 2018 00:13 )    Color: Yellow / Appearance: very cloudy / S.010 / pH: x  Gluc: x / Ketone: Negative  / Bili: Negative / Urobili: Negative mg/dL   Blood: x / Protein: 30 mg/dL / Nitrite: Negative   Leuk Esterase: Moderate / RBC: 25-50 /HPF / WBC 6-10   Sq Epi: x / Non Sq Epi: Occasional / Bacteria: Few      LIVER FUNCTIONS - ( 05 Mar 2018 20:10 )  Alb: 3.1 g/dL / Pro: 6.6 g/dL / ALK PHOS: 72 U/L / ALT: 8 U/L / AST: 81 U/L / GGT: x           CARDIAC MARKERS ( 05 Mar 2018 20:10 )  x     / 0.08 ng/mL / 1091 U/L / x     / 20.1 ng/mL             RECENT CULTURES:   @ 05:47      RVP  NotDetec      =-==================       EXAM:  XR CHEST AP OR PA 1V                          PROCEDURE DATE:  2018          INTERPRETATION:  Chest radiograph (one view)     CPT 51847    CLINICAL INFORMATION:  Patient is unable to communicate.  Short of   breath.     TECHNIQUE:  Single frontal view of the chest was obtained.    FINDINGS:  Prior study dated 2018 was available for review.    The lungs are clear.  No pleural abnormality is seen.      The heart and mediastinum appear intact.  Hiatal hernia is noted. There   is partial visualization of bilateral shoulder replacements.      IMPRESSION: No gross consolidation is seen.   Hiatal hernia noted.                 TYLER BENJAMIN M.D., ATTENDING RADIOLOGIST  This document has been electronically signed. Mar  6 2018  9:46AM                ====================       EXAM:  CT ABDOMEN AND PELVIS                         EXAM:  CT CHEST                          PROCEDURE DATE:  2018          INTERPRETATION:  CT CHEST, ABDOMEN AND PELVIS WITHOUT CONTRAST    INDICATION: Sepsis.    TECHNIQUE: Noncontrast CT of the chest, abdomen and pelvis.     COMPARISON: None.    FINDINGS:    ABSENCE OF INTRAVENOUS CONTRAST LIMITS EVALUATION FOR TRAUMATIC INJURY,   FOCAL LESIONS, NEOPLASM, AND VASCULAR PATHOLOGY.    Thorax:  Lines and tubes: None.  Airways: Tracheobronchial tree is patent.  Lungs: No pneumonia, pulmonary edema, or dominant masses.  Mediastinum and lymph nodes: No mass or hemorrhage. No worrisome   mediastinal adenopathy. No worrisome hilar or axillary adenopathy.  Heart: Normal size. No pericardial effusion.  Vessels: Normal size.    Liver: No suspicious lesions.      Biliary: No dilatation. Cholelithiasis.  Spleen: No suspicious lesions.      Pancreas: No inflammatory changes or ductal dilatation.      Adrenals: Normal.      Kidneys: No hydronephrosis or solid mass.      Aorta and IVC: Normal caliber. Moderate atherosclerotic disease of the   aorta and its branches.    GI tract: No evidence of small bowel obstruction. No wall thickening or   inflammatory changes.     Large hiatal hernia.    Peritoneum/retroperitoneum and mesentery: No free air. No organized fluid   collection. No adenopathy.        Pelvic organs/Bladder: No pelvic masses. Bladder is normal.        Abdominal wall: Fat-containing left inguinal hernia.  Bones and soft tissues: No destructive lesion. Multilevel degenerative   changes of the spine. Bilateral shoulder arthroplasties noted. Nodular   soft tissue density under the left nipple.    IMPRESSION:    No specific evidence of infection in the chest, abdomen or pelvis.    Nodular soft tissue density under the left nipple may reflect   gynecomastia, correlate with clinical examination.      GRABIEL ECKERT M.D., ATTENDING RADIOLOGIST  This document has been electronically signed. Mar  5 2018 10:52PM

## 2018-03-06 NOTE — H&P ADULT - ASSESSMENT
pt. is admitted for     UTI, unspecified site, with hematuria, initial encounter, will keep on zosyn,  renal dosing.  follow cultures.     Sepsis, unspecified, likely source uti.  follow blood , urine cultures. will get ID consult.     JETT, likely combination of poor po intake,  Levaquin and was on diuretic as well. will give gentle hydration, renal sonogram, nephrology consult  dr. Townsend.    Dehydration. pt. is clinically and labs indicate dehydration will hold indapamide. gentle iv fluids.     Elevated trop, likely renally related, will trend, no cp. cardio consult [facility].     Rhabdomyolysis, iv hydration, follow repeat labs.     Parkinson's disease, levodopa/ carbidopa. renally dosed. pt. is admitted for     UTI, unspecified site, with hematuria, initial encounter, will keep on zosyn,  renal dosing.  follow cultures.     Sepsis, unspecified organism , likely source uti.  follow blood , urine cultures. will get ID consult.     JETT, likely combination of poor po intake,  Levaquin and was on diuretic as well. will give gentle hydration, renal sonogram, nephrology consult  dr. Townsend.    Dehydration. pt. is clinically and labs indicate dehydration will hold indapamide. gentle iv fluids.     Elevated trop, likely renally related, will trend, no cp. cardio consult [facility].     Rhabdomyolysis, iv hydration, follow repeat labs.     Parkinson's disease, levodopa/ carbidopa. renally dosed.

## 2018-03-06 NOTE — H&P ADULT - HISTORY OF PRESENT ILLNESS
85 y/o male was sent in from his rehab where he was noted to have abnormal labs, wbc was high. Pt. was admitted at [facility] in feb, 2018 and had influenza and was then sent to rehab. As per daughter pt. has urine frequency and pt. reports on and off dysuria. no cough. no fever. no abd. pain. no n/v/d. no cp. pt's po intake has not been good for past few days as his wife just passed away. no other complaints at this point. 85 y/o male was sent in from his rehab where he was noted to have abnormal labs, wbc was high. pt. was started on levaquin on 3/3/18 by NH.  Pt. was admitted at [facility] in feb, 2018 and had influenza and was then sent to rehab. As per daughter pt. has urine frequency and pt. reports on and off dysuria. no cough. no fever. no abd. pain. no n/v/d. no cp. pt's po intake has not been good for past few days as his wife just passed away. no other complaints at this point.

## 2018-03-06 NOTE — CONSULT NOTE ADULT - SUBJECTIVE AND OBJECTIVE BOX
Consult requested by:  dr Saavedra    Reason for Consultation: elevated tropin     History obtained by: Patient and medical record     obtained: No    Chief complaint:  Weakness     HPI:  85 y/o male was sent in from his rehab where he was noted to have abnormal labs, wbc was high. pt. was started on levaquin on 3/3/18 by NH.  Pt. was admitted at [facility] in 2018 and had influenza and was then sent to rehab. As per daughter pt. has urine frequency and pt. reports on and off dysuria. no cough. no fever. no abd. pain. no n/v/d. no cp. pt's po intake has not been good for past few days as his wife just passed away. no other complaints at this point. (06 Mar 2018 02:53)  Above was reviewed and noted: I met and examined this patient in the Ed with daughter at bed side. He admits to weakness and fatigue but denies chest pain, SOB, palpitation, N/V/D, chills, sweats, H/A.     PAST MEDICAL & SURGICAL HISTORY:  Noel esophagus  Spinal stenosis  Parkinson disease  Hypertrophy (Benign) of Prostate  High Cholesterol  History of Hypertension  Bilateral Cataracts  History of Appendectomy  Retina: surgery    P/S: Ex-smoker         no alcohol or drugs    Family Hx:  not available         REVIEW OF SYSTEMS:  CONSTITUTIONAL: No fever, + weight loss and fatigue  EYES: No eye pain, visual disturbances, or discharge  ENMT:  No difficulty hearing, tinnitus, vertigo; No sinus or throat pain  NECK: No pain or stiffness  RESPIRATORY: No cough, wheezing, chills or hemoptysis; No shortness of breath  CARDIOVASCULAR: No chest pain, palpitations, dizziness, or 1+ leg swelling  GASTROINTESTINAL: No abdominal or epigastric pain. No nausea, vomiting, or hematemesis; No diarrhea or constipation. No melena or hematochezia.  GENITOURINARY: + frequency, no hematuria, or incontinence  NEUROLOGICAL: No headaches, memory loss, loss of strength, numbness, or tremors  SKIN: No itching, burning, rashes, or lesions   LYMPH NODES: No enlarged glands  ENDOCRINE: No heat or cold intolerance; No hair loss  MUSCULOSKELETAL: No joint pain or swelling; No muscle, back, or extremity pain  PSYCHIATRIC: No depression, anxiety, mood swings, or difficulty sleeping  HEME/LYMPH: No easy bruising, or bleeding gums  ALLERY AND IMMUNOLOGIC: No hives or eczema      Allergies  aspirin (Unknown)    MEDICATIONS  (STANDING):  ascorbic acid 250 milliGRAM(s) Oral daily  carbidopa/levodopa  25/100 1 Tablet(s) Oral three times a day  finasteride 5 milliGRAM(s) Oral daily  gabapentin 300 milliGRAM(s) Oral three times a day  heparin  Injectable 5000 Unit(s) SubCutaneous every 12 hours  multivitamin 1 Tablet(s) Oral daily  pantoprazole    Tablet 40 milliGRAM(s) Oral before breakfast  piperacillin/tazobactam IVPB. 2.25 Gram(s) IV Intermittent every 8 hours  sodium chloride 0.9%. 1000 milliLiter(s) (75 mL/Hr) IV Continuous <Continuous>  tamsulosin 0.4 milliGRAM(s) Oral at bedtime    MEDICATIONS  (PRN):  acetaminophen   Tablet 650 milliGRAM(s) Oral every 6 hours PRN For Temp greater than or equal to 38 C (100.4 F)      Vital Signs Last 24 Hrs  T(C): 36.4 (06 Mar 2018 08:51), Max: 36.9 (06 Mar 2018 02:53)  T(F): 97.5 (06 Mar 2018 08:51), Max: 98.5 (06 Mar 2018 02:53)  HR: 77 (06 Mar 2018 08:51) (77 - 82)  BP: 92/50 (06 Mar 2018 08:51) (92/50 - 115/79)  BP(mean): --  RR: 18 (06 Mar 2018 08:51) (18 - 20)  SpO2: 95% (06 Mar 2018 08:51) (93% - 96%)    PHYSICAL EXAM:  GENERAL: NAD, well-groomed, well-developed  HEAD:  Atraumatic, Normocephalic  EYES: EOMI, PERRLA, conjunctiva and sclera clear  ENMT: No tonsillar erythema, exudates, or enlargement; Moist mucous membranes, Good dentition, No lesions  NECK: Supple, No JVD, Normal thyroid  NERVOUS SYSTEM:  Alert & Oriented X3, Good concentration; Motor Strength 5/5 B/L upper and lower extremities;   CHEST/LUNG: Clear to percussion bilaterally; No rales, rhonchi, wheezing, or rubs  HEART: Regular rate and rhythm; No murmurs, rubs, or gallops  ABDOMEN: Soft, Nontender, Nondistended; Bowel sounds present  EXTREMITIES:  2+ Peripheral Pulses, No clubbing, cyanosis, 1+ LE edema  LYMPH: No lymphadenopathy noted  SKIN: No rashes or lesions      LAb:   CARDIAC MARKERS ( 05 Mar 2018 20:10 )  x     / 0.08 ng/mL / 1091 U/L / x     / 20.1 ng/mL                        12.1   35.2  )-----------( 223      ( 05 Mar 2018 20:10 )             35.2     05 Mar 2018 20:10    133    |  90     |  92.0   ----------------------------<  93     4.1     |  23.0   |  5.44     Ca    9.4        05 Mar 2018 20:10  Phos  2.8       05 Mar 2018 20:10  Mg     1.8       05 Mar 2018 20:10    TPro  6.6    /  Alb  3.1    /  TBili  0.4    /  DBili  x      /  AST  81     /  ALT  8      /  AlkPhos  72     05 Mar 2018 20:10    PT/INR - ( 05 Mar 2018 20:10 )   PT: 12.4 sec;   INR: 1.12 ratio        CAPILLARY BLOOD GLUCOSE    LIVER FUNCTIONS - ( 05 Mar 2018 20:10 )  Alb: 3.1 g/dL / Pro: 6.6 g/dL / ALK PHOS: 72 U/L / ALT: 8 U/L / AST: 81 U/L / GGT: x           Urinalysis Basic - ( 06 Mar 2018 00:13 )    Color: Yellow / Appearance: very cloudy / S.010 / pH: x  Gluc: x / Ketone: Negative  / Bili: Negative / Urobili: Negative mg/dL   Blood: x / Protein: 30 mg/dL / Nitrite: Negative   Leuk Esterase: Moderate / RBC: 25-50 /HPF / WBC 6-10   Sq Epi: x / Non Sq Epi: Occasional / Bacteria: Few    EKG: NSR rate 85 no acute ischemic changes

## 2018-03-07 LAB
ANION GAP SERPL CALC-SCNC: 16 MMOL/L — SIGNIFICANT CHANGE UP (ref 5–17)
BUN SERPL-MCNC: 85 MG/DL — HIGH (ref 8–20)
CALCIUM SERPL-MCNC: 8.6 MG/DL — SIGNIFICANT CHANGE UP (ref 8.6–10.2)
CHLORIDE SERPL-SCNC: 101 MMOL/L — SIGNIFICANT CHANGE UP (ref 98–107)
CO2 SERPL-SCNC: 21 MMOL/L — LOW (ref 22–29)
CREAT SERPL-MCNC: 4.17 MG/DL — HIGH (ref 0.5–1.3)
GLUCOSE SERPL-MCNC: 101 MG/DL — SIGNIFICANT CHANGE UP (ref 70–115)
HCT VFR BLD CALC: 30.4 % — LOW (ref 42–52)
HGB BLD-MCNC: 10.2 G/DL — LOW (ref 14–18)
MAGNESIUM SERPL-MCNC: 1.8 MG/DL — SIGNIFICANT CHANGE UP (ref 1.6–2.6)
MCHC RBC-ENTMCNC: 31.1 PG — HIGH (ref 27–31)
MCHC RBC-ENTMCNC: 33.6 G/DL — SIGNIFICANT CHANGE UP (ref 32–36)
MCV RBC AUTO: 92.7 FL — SIGNIFICANT CHANGE UP (ref 80–94)
PHOSPHATE SERPL-MCNC: 3.5 MG/DL — SIGNIFICANT CHANGE UP (ref 2.4–4.7)
PLATELET # BLD AUTO: 210 K/UL — SIGNIFICANT CHANGE UP (ref 150–400)
POTASSIUM SERPL-MCNC: 3.3 MMOL/L — LOW (ref 3.5–5.3)
POTASSIUM SERPL-SCNC: 3.3 MMOL/L — LOW (ref 3.5–5.3)
PROLACTIN SERPL-MCNC: 29.9 NG/ML — HIGH (ref 4.1–18.4)
RBC # BLD: 3.28 M/UL — LOW (ref 4.6–6.2)
RBC # FLD: 13.5 % — SIGNIFICANT CHANGE UP (ref 11–15.6)
SODIUM SERPL-SCNC: 138 MMOL/L — SIGNIFICANT CHANGE UP (ref 135–145)
WBC # BLD: 14.8 K/UL — HIGH (ref 4.8–10.8)
WBC # FLD AUTO: 14.8 K/UL — HIGH (ref 4.8–10.8)

## 2018-03-07 PROCEDURE — 99233 SBSQ HOSP IP/OBS HIGH 50: CPT

## 2018-03-07 RX ORDER — ACETAMINOPHEN 500 MG
650 TABLET ORAL ONCE
Qty: 0 | Refills: 0 | Status: COMPLETED | OUTPATIENT
Start: 2018-03-07 | End: 2018-03-07

## 2018-03-07 RX ORDER — POTASSIUM CHLORIDE 20 MEQ
20 PACKET (EA) ORAL ONCE
Qty: 0 | Refills: 0 | Status: COMPLETED | OUTPATIENT
Start: 2018-03-07 | End: 2018-03-07

## 2018-03-07 RX ORDER — OXYCODONE AND ACETAMINOPHEN 5; 325 MG/1; MG/1
1 TABLET ORAL ONCE
Qty: 0 | Refills: 0 | Status: DISCONTINUED | OUTPATIENT
Start: 2018-03-07 | End: 2018-03-08

## 2018-03-07 RX ORDER — FENOFIBRIC ACID 105 MG/1
1 TABLET ORAL
Qty: 0 | Refills: 0 | COMMUNITY

## 2018-03-07 RX ORDER — SACCHAROMYCES BOULARDII 250 MG
250 POWDER IN PACKET (EA) ORAL
Qty: 0 | Refills: 0 | Status: COMPLETED | OUTPATIENT
Start: 2018-03-07 | End: 2018-03-12

## 2018-03-07 RX ORDER — CARBIDOPA AND LEVODOPA 25; 100 MG/1; MG/1
1 TABLET ORAL THREE TIMES A DAY
Qty: 0 | Refills: 0 | Status: DISCONTINUED | OUTPATIENT
Start: 2018-03-07 | End: 2018-03-12

## 2018-03-07 RX ORDER — TAMSULOSIN HYDROCHLORIDE 0.4 MG/1
0.8 CAPSULE ORAL AT BEDTIME
Qty: 0 | Refills: 0 | Status: DISCONTINUED | OUTPATIENT
Start: 2018-03-07 | End: 2018-03-12

## 2018-03-07 RX ORDER — GABAPENTIN 400 MG/1
300 CAPSULE ORAL DAILY
Qty: 0 | Refills: 0 | Status: DISCONTINUED | OUTPATIENT
Start: 2018-03-08 | End: 2018-03-12

## 2018-03-07 RX ORDER — PANTOPRAZOLE SODIUM 20 MG/1
1 TABLET, DELAYED RELEASE ORAL
Qty: 0 | Refills: 0 | COMMUNITY

## 2018-03-07 RX ORDER — CARBIDOPA AND LEVODOPA 25; 100 MG/1; MG/1
1 TABLET ORAL THREE TIMES A DAY
Qty: 0 | Refills: 0 | Status: DISCONTINUED | OUTPATIENT
Start: 2018-03-07 | End: 2018-03-07

## 2018-03-07 RX ORDER — CEFEPIME 1 G/1
500 INJECTION, POWDER, FOR SOLUTION INTRAMUSCULAR; INTRAVENOUS
Qty: 0 | Refills: 0 | Status: DISCONTINUED | OUTPATIENT
Start: 2018-03-07 | End: 2018-03-08

## 2018-03-07 RX ADMIN — TAMSULOSIN HYDROCHLORIDE 0.8 MILLIGRAM(S): 0.4 CAPSULE ORAL at 22:52

## 2018-03-07 RX ADMIN — CARBIDOPA AND LEVODOPA 1 TABLET(S): 25; 100 TABLET ORAL at 05:49

## 2018-03-07 RX ADMIN — HEPARIN SODIUM 5000 UNIT(S): 5000 INJECTION INTRAVENOUS; SUBCUTANEOUS at 05:49

## 2018-03-07 RX ADMIN — FINASTERIDE 5 MILLIGRAM(S): 5 TABLET, FILM COATED ORAL at 11:52

## 2018-03-07 RX ADMIN — PANTOPRAZOLE SODIUM 40 MILLIGRAM(S): 20 TABLET, DELAYED RELEASE ORAL at 05:49

## 2018-03-07 RX ADMIN — CARBIDOPA AND LEVODOPA 1 TABLET(S): 25; 100 TABLET ORAL at 17:42

## 2018-03-07 RX ADMIN — PIPERACILLIN AND TAZOBACTAM 200 GRAM(S): 4; .5 INJECTION, POWDER, LYOPHILIZED, FOR SOLUTION INTRAVENOUS at 05:49

## 2018-03-07 RX ADMIN — HEPARIN SODIUM 5000 UNIT(S): 5000 INJECTION INTRAVENOUS; SUBCUTANEOUS at 17:47

## 2018-03-07 RX ADMIN — Medication 250 MILLIGRAM(S): at 11:52

## 2018-03-07 RX ADMIN — GABAPENTIN 300 MILLIGRAM(S): 400 CAPSULE ORAL at 05:48

## 2018-03-07 RX ADMIN — CEFEPIME 100 MILLIGRAM(S): 1 INJECTION, POWDER, FOR SOLUTION INTRAMUSCULAR; INTRAVENOUS at 17:48

## 2018-03-07 RX ADMIN — Medication 650 MILLIGRAM(S): at 03:03

## 2018-03-07 RX ADMIN — Medication 20 MILLIEQUIVALENT(S): at 11:52

## 2018-03-07 RX ADMIN — Medication 650 MILLIGRAM(S): at 04:03

## 2018-03-07 RX ADMIN — Medication 1 TABLET(S): at 11:52

## 2018-03-07 RX ADMIN — CARBIDOPA AND LEVODOPA 1 TABLET(S): 25; 100 TABLET ORAL at 22:52

## 2018-03-07 RX ADMIN — Medication 250 MILLIGRAM(S): at 17:48

## 2018-03-07 NOTE — PROGRESS NOTE ADULT - SUBJECTIVE AND OBJECTIVE BOX
[facility] DIVISION OF KIDNEY DISEASES AND HYPERTENSION -- FOLLOW UP NOTE  --------------------------------------------------------------------------------  Chief Complaint: JETT    24 hour events/subjective:  Pt seen and examined  On IVF  Renal function improving  Making urine;       PAST HISTORY  --------------------------------------------------------------------------------  No significant changes to PMH, PSH, FHx, SHx, unless otherwise noted    ALLERGIES & MEDICATIONS  --------------------------------------------------------------------------------  Allergies    aspirin (Unknown)    Intolerances      Standing Inpatient Medications  ascorbic acid 250 milliGRAM(s) Oral daily  carbidopa/levodopa  25/250 1 Tablet(s) Oral three times a day  cefepime  IVPB 500 milliGRAM(s) IV Intermittent <User Schedule>  finasteride 5 milliGRAM(s) Oral daily  heparin  Injectable 5000 Unit(s) SubCutaneous every 12 hours  multivitamin 1 Tablet(s) Oral daily  pantoprazole    Tablet 40 milliGRAM(s) Oral before breakfast  saccharomyces boulardii 250 milliGRAM(s) Oral two times a day  sodium chloride 0.9%. 1000 milliLiter(s) IV Continuous <Continuous>  tamsulosin 0.8 milliGRAM(s) Oral at bedtime    PRN Inpatient Medications  acetaminophen   Tablet 650 milliGRAM(s) Oral every 6 hours PRN      REVIEW OF SYSTEMS  --------------------------------------------------------------------------------  Gen: No weight changes, fatigue, fevers/chills, weakness  Skin: No rashes  Head/Eyes/Ears/Mouth: No headache; Normal hearing; Normal vision w/o blurriness; No sinus pain/discomfort, sore throat  Respiratory: No dyspnea, cough, wheezing, hemoptysis  CV: No chest pain, PND, orthopnea  GI: No abdominal pain, diarrhea, constipation, nausea, vomiting, melena, hematochezia  : No increased frequency, dysuria, hematuria, nocturia  MSK: No joint pain/swelling; no back pain; no edema  Neuro: No dizziness/lightheadedness, weakness, seizures, numbness, tingling  Heme: No easy bruising or bleeding  Endo: No heat/cold intolerance  Psych: No significant nervousness, anxiety, stress, depression    All other systems were reviewed and are negative, except as noted.    VITALS/PHYSICAL EXAM  --------------------------------------------------------------------------------  T(C): 36.6 (03-07-18 @ 09:08), Max: 36.9 (03-06-18 @ 15:37)  HR: 71 (03-07-18 @ 09:08) (70 - 73)  BP: 108/56 (03-07-18 @ 09:08) (106/57 - 110/55)  RR: 18 (03-07-18 @ 09:08) (14 - 18)  SpO2: 96% (03-07-18 @ 09:08) (94% - 96%)  Wt(kg): --  Height (cm): 165.1 (03-06-18 @ 05:42)  Weight (kg): 91.6 (03-06-18 @ 05:42)  BMI (kg/m2): 33.6 (03-06-18 @ 05:42)  BSA (m2): 1.99 (03-06-18 @ 05:42)      03-06-18 @ 07:01  -  03-07-18 @ 07:00  --------------------------------------------------------  IN: 0 mL / OUT: 1450 mL / NET: -1450 mL      Physical Exam:  	Gen: NAD  	HEENT: PERRL, supple neck, clear oropharynx  	Pulm: CTA B/L  	CV: RRR, S1S2; no rub  	Back: No spinal or CVA tenderness; no sacral edema  	Abd: +BS, soft, nontender; dist+  	: No suprapubic tenderness  	UE: Warm, FROM, no clubbing, intact strength; no edema; no asterixis  	LE: Warm, FROM, no clubbing, intact strength; + edema  	Neuro: No focal deficits, intact gait  	Skin: Warm, without rashes      LABS/STUDIES  --------------------------------------------------------------------------------              10.2   14.8  >-----------<  210      [03-07-18 @ 07:57]              30.4     138  |  101  |  85.0  ----------------------------<  101      [03-07-18 @ 07:57]  3.3   |  21.0  |  4.17        Ca     8.6     [03-07-18 @ 07:57]      Mg     1.8     [03-07-18 @ 07:57]      Phos  3.5     [03-07-18 @ 07:57]    TPro  5.2  /  Alb  2.5  /  TBili  0.3  /  DBili  x   /  AST  54  /  ALT  <5  /  AlkPhos  60  [03-06-18 @ 12:05]    PT/INR: PT 12.4 , INR 1.12       [03-05-18 @ 20:10]    Troponin 0.05      [03-06-18 @ 21:36]        [03-06-18 @ 21:36]    Creatinine Trend:  SCr 4.17 [03-07 @ 07:57]  SCr 4.77 [03-06 @ 12:05]  SCr 5.44 [03-05 @ 20:10]  SCr 1.48 [02-21 @ 08:24]  SCr 1.39 [02-20 @ 08:31]    Urinalysis - [03-06-18 @ 00:13]      Color Yellow / Appearance very cloudy / SG 1.010 / pH 5.0      Gluc Negative / Ketone Negative  / Bili Negative / Urobili Negative       Blood Large / Protein 30 / Leuk Est Moderate / Nitrite Negative      RBC 25-50 / WBC 6-10 / Hyaline  / Gran  / Sq Epi  / Non Sq Epi Occasional / Bacteria Few      TSH 4.86      [03-05-18 @ 20:10]

## 2018-03-07 NOTE — PROGRESS NOTE ADULT - ASSESSMENT
1) ATN  2) UTI  3) Hypokalemia  4) Anemia  5) UTI  6) Rhabdo    Patient likely prerenal from UTI; decreased po; further evidence is his hypokalemia and low phosphorus;  This in combination with his NSAID use over the past week has likely tipped him into ATN given his significant rise in SCr  Would check UA; urine sodium, chloride, osm (ordered)  Renal sonogram reviewed  Continue with IVF  WBC improving  Strict IO; tovar in place  Avoid nephrotoxic agents ie NSAIDs  Will follow  gwen Arndt

## 2018-03-07 NOTE — PROGRESS NOTE ADULT - SUBJECTIVE AND OBJECTIVE BOX
CC: ABNORMAL LABS/ARF    INTERVAL HPI/OVERNIGHT EVENTS: Patient seen and examined, sitting in chair. No acute events overnight. Denies chest pain, SOB, dizziness, lightheadedness, nausea, vomiting, fever, chills    Vital Signs Last 24 Hrs  T(C): 36.6 (07 Mar 2018 09:08), Max: 36.9 (06 Mar 2018 15:37)  T(F): 97.9 (07 Mar 2018 09:08), Max: 98.5 (06 Mar 2018 15:37)  HR: 71 (07 Mar 2018 09:08) (70 - 73)  BP: 108/56 (07 Mar 2018 09:08) (106/57 - 110/55)  BP(mean): --  RR: 18 (07 Mar 2018 09:08) (14 - 18)  SpO2: 96% (07 Mar 2018 09:08) (94% - 96%)    PHYSICAL EXAM:  Constitutional: NAD   HEENT: PERRLA, EOMI, Normal Hearing, MMM  Neck: No LAD, No JVD  Respiratory: CTAB Cardiovascular: S1 and S2, RRR, no M/G/R  Gastrointestinal: BS+, soft, NT/ND  Extremities: No peripheral edema  Vascular: 2+ peripheral pulses  Neurological: A/O x 3,   Ramirez in place      I&O's Detail    06 Mar 2018 07:01  -  07 Mar 2018 07:00  --------------------------------------------------------  IN:  Total IN: 0 mL    OUT:    Indwelling Catheter - Urethral: 1450 mL  Total OUT: 1450 mL    Total NET: -1450 mL          CARDIAC MARKERS ( 06 Mar 2018 21:36 )  x     / 0.05 ng/mL / 479 U/L / x     / 7.8 ng/mL  CARDIAC MARKERS ( 06 Mar 2018 15:22 )  x     / 0.06 ng/mL / 591 U/L / x     / 11.2 ng/mL  CARDIAC MARKERS ( 06 Mar 2018 12:05 )  x     / 0.06 ng/mL / 660 U/L / x     / 11.7 ng/mL  CARDIAC MARKERS ( 05 Mar 2018 20:10 )  x     / 0.08 ng/mL / 1091 U/L / x     / 20.1 ng/mL                            10.2   14.8  )-----------( 210      ( 07 Mar 2018 07:57 )             30.4     07 Mar 2018 07:57    138    |  101    |  85.0   ----------------------------<  101    3.3     |  21.0   |  4.17     Ca    8.6        07 Mar 2018 07:57  Phos  3.5       07 Mar 2018 07:57  Mg     1.8       07 Mar 2018 07:57    TPro  5.2    /  Alb  2.5    /  TBili  0.3    /  DBili  x      /  AST  54     /  ALT  <5     /  AlkPhos  60     06 Mar 2018 12:05    PT/INR - ( 05 Mar 2018 20:10 )   PT: 12.4 sec;   INR: 1.12 ratio           CAPILLARY BLOOD GLUCOSE        LIVER FUNCTIONS - ( 06 Mar 2018 12:05 )  Alb: 2.5 g/dL / Pro: 5.2 g/dL / ALK PHOS: 60 U/L / ALT: <5 U/L / AST: 54 U/L / GGT: x           Urinalysis Basic - ( 06 Mar 2018 00:13 )    Color: Yellow / Appearance: very cloudy / S.010 / pH: x  Gluc: x / Ketone: Negative  / Bili: Negative / Urobili: Negative mg/dL   Blood: x / Protein: 30 mg/dL / Nitrite: Negative   Leuk Esterase: Moderate / RBC: 25-50 /HPF / WBC 6-10   Sq Epi: x / Non Sq Epi: Occasional / Bacteria: Few        MEDICATIONS  (STANDING):  ascorbic acid 250 milliGRAM(s) Oral daily  carbidopa/levodopa  25/250 1 Tablet(s) Oral three times a day  cefepime  IVPB 500 milliGRAM(s) IV Intermittent <User Schedule>  finasteride 5 milliGRAM(s) Oral daily  heparin  Injectable 5000 Unit(s) SubCutaneous every 12 hours  multivitamin 1 Tablet(s) Oral daily  pantoprazole    Tablet 40 milliGRAM(s) Oral before breakfast  saccharomyces boulardii 250 milliGRAM(s) Oral two times a day  sodium chloride 0.9%. 1000 milliLiter(s) (75 mL/Hr) IV Continuous <Continuous>  tamsulosin 0.8 milliGRAM(s) Oral at bedtime    MEDICATIONS  (PRN):  acetaminophen   Tablet 650 milliGRAM(s) Oral every 6 hours PRN For Temp greater than or equal to 38 C (100.4 F)      RADIOLOGY & ADDITIONAL TESTS:

## 2018-03-07 NOTE — PROGRESS NOTE ADULT - ASSESSMENT
86 year old male with past medical history of spinal stenosis; Parkinson's ; BPH, HTN; Noel's esophagus; noted to have UTI in rehab center; started on levaquin 3/3/18. Patient accompanied by his son who states patient  took advil for the past week as well. Pt noted to have very high white blood cell count; tovar placed.  Patient most recently had flu in Feb 2018; treated at [facility].       >UTI: GNR ID following, now on IV Maxipime.     >Sepsis: likely source uti.  BC pending. Continue IV    >JETT:   likely combination of poor po intake,  Levaquin and diuretic use.  Continue  gentle hydration, Renal following, avoid nephrotoxic drugs.     >Dehydration: Continue IV fluids    > Elevated trop: likely renally related, appreciate Cardiology input, dc cardiac moniotr    > Rhabdomyolysis: iv hydration, improved, follow repeat labs.     >Urinary Retention: Tovar, increase Flomax to 0.8. Will attempt TOV in am, avoid constipation, PT for ambulation    >Parkinson's disease: levodopa/ carbidopa.    dispo: PT littleal

## 2018-03-08 DIAGNOSIS — R19.7 DIARRHEA, UNSPECIFIED: ICD-10-CM

## 2018-03-08 DIAGNOSIS — N39.0 URINARY TRACT INFECTION, SITE NOT SPECIFIED: ICD-10-CM

## 2018-03-08 LAB
-  AMIKACIN: SIGNIFICANT CHANGE UP
-  AMPICILLIN/SULBACTAM: SIGNIFICANT CHANGE UP
-  AMPICILLIN: SIGNIFICANT CHANGE UP
-  AZTREONAM: SIGNIFICANT CHANGE UP
-  CEFAZOLIN: SIGNIFICANT CHANGE UP
-  CEFEPIME: SIGNIFICANT CHANGE UP
-  CEFOXITIN: SIGNIFICANT CHANGE UP
-  CEFTAZIDIME: SIGNIFICANT CHANGE UP
-  CEFTRIAXONE: SIGNIFICANT CHANGE UP
-  CIPROFLOXACIN: SIGNIFICANT CHANGE UP
-  ERTAPENEM: SIGNIFICANT CHANGE UP
-  GENTAMICIN: SIGNIFICANT CHANGE UP
-  IMIPENEM: SIGNIFICANT CHANGE UP
-  LEVOFLOXACIN: SIGNIFICANT CHANGE UP
-  MEROPENEM: SIGNIFICANT CHANGE UP
-  NITROFURANTOIN: SIGNIFICANT CHANGE UP
-  PIPERACILLIN/TAZOBACTAM: SIGNIFICANT CHANGE UP
-  TOBRAMYCIN: SIGNIFICANT CHANGE UP
-  TRIMETHOPRIM/SULFAMETHOXAZOLE: SIGNIFICANT CHANGE UP
ANION GAP SERPL CALC-SCNC: 14 MMOL/L — SIGNIFICANT CHANGE UP (ref 5–17)
BUN SERPL-MCNC: 68 MG/DL — HIGH (ref 8–20)
CALCIUM SERPL-MCNC: 9 MG/DL — SIGNIFICANT CHANGE UP (ref 8.6–10.2)
CHLORIDE SERPL-SCNC: 105 MMOL/L — SIGNIFICANT CHANGE UP (ref 98–107)
CO2 SERPL-SCNC: 23 MMOL/L — SIGNIFICANT CHANGE UP (ref 22–29)
CREAT SERPL-MCNC: 3.16 MG/DL — HIGH (ref 0.5–1.3)
CULTURE RESULTS: SIGNIFICANT CHANGE UP
CULTURE RESULTS: SIGNIFICANT CHANGE UP
GLUCOSE SERPL-MCNC: 108 MG/DL — SIGNIFICANT CHANGE UP (ref 70–115)
HCT VFR BLD CALC: 34.4 % — LOW (ref 42–52)
HGB BLD-MCNC: 11.5 G/DL — LOW (ref 14–18)
MAGNESIUM SERPL-MCNC: 1.8 MG/DL — SIGNIFICANT CHANGE UP (ref 1.8–2.6)
MCHC RBC-ENTMCNC: 30.8 PG — SIGNIFICANT CHANGE UP (ref 27–31)
MCHC RBC-ENTMCNC: 33.4 G/DL — SIGNIFICANT CHANGE UP (ref 32–36)
MCV RBC AUTO: 92.2 FL — SIGNIFICANT CHANGE UP (ref 80–94)
METHOD TYPE: SIGNIFICANT CHANGE UP
ORGANISM # SPEC MICROSCOPIC CNT: SIGNIFICANT CHANGE UP
ORGANISM # SPEC MICROSCOPIC CNT: SIGNIFICANT CHANGE UP
PHOSPHATE SERPL-MCNC: 3.4 MG/DL — SIGNIFICANT CHANGE UP (ref 2.4–4.7)
PLATELET # BLD AUTO: 235 K/UL — SIGNIFICANT CHANGE UP (ref 150–400)
POTASSIUM SERPL-MCNC: 3.7 MMOL/L — SIGNIFICANT CHANGE UP (ref 3.5–5.3)
POTASSIUM SERPL-SCNC: 3.7 MMOL/L — SIGNIFICANT CHANGE UP (ref 3.5–5.3)
RBC # BLD: 3.73 M/UL — LOW (ref 4.6–6.2)
RBC # FLD: 13.6 % — SIGNIFICANT CHANGE UP (ref 11–15.6)
SODIUM SERPL-SCNC: 142 MMOL/L — SIGNIFICANT CHANGE UP (ref 135–145)
SPECIMEN SOURCE: SIGNIFICANT CHANGE UP
SPECIMEN SOURCE: SIGNIFICANT CHANGE UP
WBC # BLD: 14 K/UL — HIGH (ref 4.8–10.8)
WBC # FLD AUTO: 14 K/UL — HIGH (ref 4.8–10.8)

## 2018-03-08 PROCEDURE — 99233 SBSQ HOSP IP/OBS HIGH 50: CPT

## 2018-03-08 PROCEDURE — 73030 X-RAY EXAM OF SHOULDER: CPT | Mod: 26,50

## 2018-03-08 PROCEDURE — 99232 SBSQ HOSP IP/OBS MODERATE 35: CPT

## 2018-03-08 PROCEDURE — 99222 1ST HOSP IP/OBS MODERATE 55: CPT

## 2018-03-08 RX ORDER — LIDOCAINE 4 G/100G
1 CREAM TOPICAL DAILY
Qty: 0 | Refills: 0 | Status: DISCONTINUED | OUTPATIENT
Start: 2018-03-08 | End: 2018-03-12

## 2018-03-08 RX ORDER — TRAMADOL HYDROCHLORIDE 50 MG/1
25 TABLET ORAL EVERY 8 HOURS
Qty: 0 | Refills: 0 | Status: DISCONTINUED | OUTPATIENT
Start: 2018-03-08 | End: 2018-03-12

## 2018-03-08 RX ORDER — CEFTRIAXONE 500 MG/1
1 INJECTION, POWDER, FOR SOLUTION INTRAMUSCULAR; INTRAVENOUS EVERY 24 HOURS
Qty: 0 | Refills: 0 | Status: COMPLETED | OUTPATIENT
Start: 2018-03-08 | End: 2018-03-12

## 2018-03-08 RX ADMIN — CEFTRIAXONE 100 GRAM(S): 500 INJECTION, POWDER, FOR SOLUTION INTRAMUSCULAR; INTRAVENOUS at 14:14

## 2018-03-08 RX ADMIN — HEPARIN SODIUM 5000 UNIT(S): 5000 INJECTION INTRAVENOUS; SUBCUTANEOUS at 06:16

## 2018-03-08 RX ADMIN — TRAMADOL HYDROCHLORIDE 25 MILLIGRAM(S): 50 TABLET ORAL at 15:45

## 2018-03-08 RX ADMIN — FINASTERIDE 5 MILLIGRAM(S): 5 TABLET, FILM COATED ORAL at 13:42

## 2018-03-08 RX ADMIN — Medication 250 MILLIGRAM(S): at 15:36

## 2018-03-08 RX ADMIN — GABAPENTIN 300 MILLIGRAM(S): 400 CAPSULE ORAL at 13:42

## 2018-03-08 RX ADMIN — PANTOPRAZOLE SODIUM 40 MILLIGRAM(S): 20 TABLET, DELAYED RELEASE ORAL at 06:16

## 2018-03-08 RX ADMIN — LIDOCAINE 1 PATCH: 4 CREAM TOPICAL at 13:43

## 2018-03-08 RX ADMIN — TAMSULOSIN HYDROCHLORIDE 0.8 MILLIGRAM(S): 0.4 CAPSULE ORAL at 22:25

## 2018-03-08 RX ADMIN — TRAMADOL HYDROCHLORIDE 25 MILLIGRAM(S): 50 TABLET ORAL at 15:40

## 2018-03-08 RX ADMIN — CARBIDOPA AND LEVODOPA 1 TABLET(S): 25; 100 TABLET ORAL at 22:25

## 2018-03-08 RX ADMIN — CARBIDOPA AND LEVODOPA 1 TABLET(S): 25; 100 TABLET ORAL at 06:16

## 2018-03-08 RX ADMIN — SODIUM CHLORIDE 75 MILLILITER(S): 9 INJECTION INTRAMUSCULAR; INTRAVENOUS; SUBCUTANEOUS at 14:16

## 2018-03-08 RX ADMIN — Medication 250 MILLIGRAM(S): at 17:42

## 2018-03-08 RX ADMIN — CARBIDOPA AND LEVODOPA 1 TABLET(S): 25; 100 TABLET ORAL at 15:37

## 2018-03-08 RX ADMIN — Medication 250 MILLIGRAM(S): at 06:16

## 2018-03-08 RX ADMIN — Medication 1 TABLET(S): at 13:42

## 2018-03-08 RX ADMIN — HEPARIN SODIUM 5000 UNIT(S): 5000 INJECTION INTRAVENOUS; SUBCUTANEOUS at 17:42

## 2018-03-08 NOTE — PHYSICAL THERAPY INITIAL EVALUATION ADULT - TRANSFER SAFETY CONCERNS NOTED: SIT/STAND, REHAB EVAL
unsteadiness throughout task, decreased activity tolerance, pt soiled him during transfer, CNA aware, pt transferred back to bed

## 2018-03-08 NOTE — PHYSICAL THERAPY INITIAL EVALUATION ADULT - RANGE OF MOTION EXAMINATION, REHAB EVAL
no ROM deficits were identified/except gustabo shoulders lack 50% AROM, left LE: hip and knee lack 40% AROM

## 2018-03-08 NOTE — PROGRESS NOTE ADULT - PROBLEM SELECTOR PLAN 1
E coli in low CFU counts, but tovar some chronicity, may be relevant.  susceptible to multiple agents.   de-escalate to Ceftriaxone. E coli in low CFU counts, but tovar in for some time, Culture is relevant.  susceptible to multiple agents.   de-escalate to Ceftriaxone.

## 2018-03-08 NOTE — PROGRESS NOTE ADULT - SUBJECTIVE AND OBJECTIVE BOX
[facility]  INFECTIOUS DISEASES AND INTERNAL MEDICINE at [place]  =======================================================  Antwon Woods MD  Diplomates American Board of Internal Medicine and Infectious Diseases  =======================================================    JAH WARD [id number]  follow up on WBC elevation, r/o sepsis    patient seen and examined in follow up.  Chart and labs reviewed.     Blood culture negative. LOW CFU of E coli in urine.   =======================================================  Allergies:  aspirin (Unknown)      =======================================================  Medications:  acetaminophen   Tablet 650 milliGRAM(s) Oral every 6 hours PRN  ascorbic acid 250 milliGRAM(s) Oral daily  carbidopa/levodopa  25/250 1 Tablet(s) Oral three times a day  cefepime  IVPB 500 milliGRAM(s) IV Intermittent <User Schedule>  finasteride 5 milliGRAM(s) Oral daily  gabapentin 300 milliGRAM(s) Oral daily  heparin  Injectable 5000 Unit(s) SubCutaneous every 12 hours  lidocaine   Patch 1 Patch Transdermal daily  lidocaine   Patch 1 Patch Transdermal daily  multivitamin 1 Tablet(s) Oral daily  oxyCODONE    5 mG/acetaminophen 325 mG 1 Tablet(s) Oral once  pantoprazole    Tablet 40 milliGRAM(s) Oral before breakfast  saccharomyces boulardii 250 milliGRAM(s) Oral two times a day  sodium chloride 0.9%. 1000 milliLiter(s) IV Continuous <Continuous>  tamsulosin 0.8 milliGRAM(s) Oral at bedtime     =======================================================     REVIEW OF SYSTEMS:  CONSTITUTIONAL:  No Fever or chills  HEENT:   No diplopia or blurred vision.  No earache, sore throat or runny nose.  CARDIOVASCULAR:  No pressure, squeezing, strangling, tightness, heaviness or aching about the chest, neck, axilla or epigastrium.  RESPIRATORY:  No cough, shortness of breath, PND or orthopnea.  GASTROINTESTINAL:  No nausea, vomiting or diarrhea.  GENITOURINARY:  No dysuria, frequency or urgency. No Blood in urine  MUSCULOSKELETAL:  no joint aches, no muscle pain  SKIN:  No change in skin, hair or nails.  NEUROLOGIC:  No paresthesias, fasciculations, seizures or weakness.  PSYCHIATRIC:  No disorder of thought or mood.  ENDOCRINE:  No heat or cold intolerance, polyuria or polydipsia.  HEMATOLOGICAL:  No easy bruising or bleeding.     =======================================================    Physical Exam:    Vital Signs Last 24 Hrs  T(C): 36.7 (08 Mar 2018 00:49), Max: 37 (07 Mar 2018 15:23)  T(F): 98 (08 Mar 2018 00:49), Max: 98.6 (07 Mar 2018 15:23)  HR: 75 (08 Mar 2018 00:49) (75 - 102)  BP: 128/74 (08 Mar 2018 00:49) (115/73 - 128/74)  RR: 18 (08 Mar 2018 00:49) (18 - 18)  SpO2: 99% (08 Mar 2018 00:49) (96% - 99%)    GEN: NAD, pleasant  HEENT: normocephalic and atraumatic. EOMI. GERMAN.    NECK: Supple. No carotid bruits.  No lymphadenopathy or thyromegaly.  LUNGS: Clear to auscultation.  HEART: Regular rate and rhythm without murmur.  ABDOMEN: Soft, nontender, and nondistended.  Positive bowel sounds.    :   FOX IN PLACE, COPIOUS SEDIMENT IN TUBING  EXTREMITIES: Without any cyanosis, clubbing, rash, lesions or edema.  MSK: no joint swelling  NEUROLOGIC: Cranial nerves II through XII are grossly intact.  PSYCHIATRIC: Appropriate affect .  SKIN: No ulceration or induration present.     =======================================================    Labs:  03-08    142  |  105  |  68.0<H>  ----------------------------<  108  3.7   |  23.0  |  3.16<H>    Ca    9.0      08 Mar 2018 06:45  Phos  3.4     03-08  Mg     1.8     03-08    TPro  5.2<L>  /  Alb  2.5<L>  /  TBili  0.3<L>  /  DBili  x   /  AST  54<H>  /  ALT  <5  /  AlkPhos  60  03-06                          11.5   14.0  )-----------( 235      ( 08 Mar 2018 06:45 )             34.4           LIVER FUNCTIONS - ( 06 Mar 2018 12:05 )  Alb: 2.5 g/dL / Pro: 5.2 g/dL / ALK PHOS: 60 U/L / ALT: <5 U/L / AST: 54 U/L / GGT: x           CARDIAC MARKERS ( 06 Mar 2018 21:36 )  x     / 0.05 ng/mL / 479 U/L / x     / 7.8 ng/mL  CARDIAC MARKERS ( 06 Mar 2018 15:22 )  x     / 0.06 ng/mL / 591 U/L / x     / 11.2 ng/mL  CARDIAC MARKERS ( 06 Mar 2018 12:05 )  x     / 0.06 ng/mL / 660 U/L / x     / 11.7 ng/mL      CAPILLARY BLOOD GLUCOSE            RECENT CULTURES:  03-06 @ 11:32 .Stool Feces     No enteric pathogens isolated.  (Stool culture examined for Salmonella,  Shigella, Campylobacter, Aeromonas, Plesiomonas,  Vibrio, E.coli O157 and Yersinia)        03-06 @ 05:47          NotDetec  03-06 @ 00:13 .Urine Clean Catch (Midstream) Escherichia coli    10,000 - 49,000 CFU/mL Escherichia coli  Culture - Urine (03.06.18 @ 00:13)    -  Amikacin: S <=16    -  Ampicillin: R >16    -  Ampicillin/Sulbactam: I 16/8    -  Aztreonam: S <=4    -  Cefazolin: S <=8    -  Cefepime: S <=4    -  Cefoxitin: S <=8    -  Ceftazidime: S <=1    -  Ceftriaxone: S <=1    -  Ciprofloxacin: S <=1    -  Ertapenem: S <=1    -  Gentamicin: S <=4    -  Imipenem: S <=1    -  Levofloxacin: S <=2    -  Meropenem: S <=1    -  Nitrofurantoin: S <=32    -  Piperacillin/Tazobactam: S <=16    -  Tobramycin: S <=4    -  Trimethoprim/Sulfamethoxazole: R >2/38    Specimen Source: .Urine Clean Catch (Midstream)    Culture Results:   10,000 - 49,000 CFU/mL Escherichia coli    Organism Identification: Escherichia coli    Organism: Escherichia coli    Method Type: SAVANNAH          03-05 @ 20:34 .Blood Blood-Peripheral     No growth at 48 hours [facility]  INFECTIOUS DISEASES AND INTERNAL MEDICINE at [place]  =======================================================  Antwon Root MD [facility] Woods MD  Diplomates American Board of Internal Medicine and Infectious Diseases  =======================================================    ED JAH [id number]  follow up on WBC elevation, r/o sepsis    patient seen and examined in follow up.  Chart and labs reviewed.     Blood culture negative. LOW CFU of E coli in urine.   still with loose BM.  no new complaints.   =======================================================  Allergies:  aspirin (Unknown)      =======================================================  Medications:  acetaminophen   Tablet 650 milliGRAM(s) Oral every 6 hours PRN  ascorbic acid 250 milliGRAM(s) Oral daily  carbidopa/levodopa  25/250 1 Tablet(s) Oral three times a day  cefepime  IVPB 500 milliGRAM(s) IV Intermittent <User Schedule>  finasteride 5 milliGRAM(s) Oral daily  gabapentin 300 milliGRAM(s) Oral daily  heparin  Injectable 5000 Unit(s) SubCutaneous every 12 hours  lidocaine   Patch 1 Patch Transdermal daily  lidocaine   Patch 1 Patch Transdermal daily  multivitamin 1 Tablet(s) Oral daily  oxyCODONE    5 mG/acetaminophen 325 mG 1 Tablet(s) Oral once  pantoprazole    Tablet 40 milliGRAM(s) Oral before breakfast  saccharomyces boulardii 250 milliGRAM(s) Oral two times a day  sodium chloride 0.9%. 1000 milliLiter(s) IV Continuous <Continuous>  tamsulosin 0.8 milliGRAM(s) Oral at bedtime     =======================================================     REVIEW OF SYSTEMS:  CONSTITUTIONAL:  No Fever or chills  HEENT:   No diplopia or blurred vision.  No earache, sore throat or runny nose.  CARDIOVASCULAR:  No pressure, squeezing, strangling, tightness, heaviness or aching about the chest, neck, axilla or epigastrium.  RESPIRATORY:  No cough, shortness of breath, PND or orthopnea.  GASTROINTESTINAL:  No nausea, vomiting or diarrhea.  GENITOURINARY:  No dysuria, frequency or urgency. No Blood in urine  MUSCULOSKELETAL:  no joint aches, no muscle pain  SKIN:  No change in skin, hair or nails.  NEUROLOGIC:  No paresthesias, fasciculations, seizures or weakness.  PSYCHIATRIC:  No disorder of thought or mood.  ENDOCRINE:  No heat or cold intolerance, polyuria or polydipsia.  HEMATOLOGICAL:  No easy bruising or bleeding.     =======================================================    Physical Exam:    Vital Signs Last 24 Hrs  T(C): 36.7 (08 Mar 2018 00:49), Max: 37 (07 Mar 2018 15:23)  T(F): 98 (08 Mar 2018 00:49), Max: 98.6 (07 Mar 2018 15:23)  HR: 75 (08 Mar 2018 00:49) (75 - 102)  BP: 128/74 (08 Mar 2018 00:49) (115/73 - 128/74)  RR: 18 (08 Mar 2018 00:49) (18 - 18)  SpO2: 99% (08 Mar 2018 00:49) (96% - 99%)    GEN: NAD, pleasant  HEENT: normocephalic and atraumatic. EOMI. GERMAN.    NECK: Supple. No carotid bruits.  No lymphadenopathy or thyromegaly.  LUNGS: Clear to auscultation.  HEART: Regular rate and rhythm without murmur.  ABDOMEN: Soft, nontender, and nondistended.  Positive bowel sounds.    :   FOX IN PLACE,  CLEAR urine in proximal tube, trace SEDIMENT IN distal TUBING  EXTREMITIES: Without any cyanosis, clubbing, rash, lesions or edema.  MSK: no joint swelling  NEUROLOGIC: Cranial nerves II through XII are grossly intact.  PSYCHIATRIC: Appropriate affect .  SKIN: No ulceration or induration present.     =======================================================    Labs:  03-08    142  |  105  |  68.0<H>  ----------------------------<  108  3.7   |  23.0  |  3.16<H>    Ca    9.0      08 Mar 2018 06:45  Phos  3.4     03-08  Mg     1.8     03-08    TPro  5.2<L>  /  Alb  2.5<L>  /  TBili  0.3<L>  /  DBili  x   /  AST  54<H>  /  ALT  <5  /  AlkPhos  60  03-06                          11.5   14.0  )-----------( 235      ( 08 Mar 2018 06:45 )             34.4           LIVER FUNCTIONS - ( 06 Mar 2018 12:05 )  Alb: 2.5 g/dL / Pro: 5.2 g/dL / ALK PHOS: 60 U/L / ALT: <5 U/L / AST: 54 U/L / GGT: x           CARDIAC MARKERS ( 06 Mar 2018 21:36 )  x     / 0.05 ng/mL / 479 U/L / x     / 7.8 ng/mL  CARDIAC MARKERS ( 06 Mar 2018 15:22 )  x     / 0.06 ng/mL / 591 U/L / x     / 11.2 ng/mL  CARDIAC MARKERS ( 06 Mar 2018 12:05 )  x     / 0.06 ng/mL / 660 U/L / x     / 11.7 ng/mL      CAPILLARY BLOOD GLUCOSE            RECENT CULTURES:  03-06 @ 11:32 .Stool Feces     No enteric pathogens isolated.  (Stool culture examined for Salmonella,  Shigella, Campylobacter, Aeromonas, Plesiomonas,  Vibrio, E.coli O157 and Yersinia)        03-06 @ 05:47          NotDetec  03-06 @ 00:13 .Urine Clean Catch (Midstream) Escherichia coli    10,000 - 49,000 CFU/mL Escherichia coli  Culture - Urine (03.06.18 @ 00:13)    -  Amikacin: S <=16    -  Ampicillin: R >16    -  Ampicillin/Sulbactam: I 16/8    -  Aztreonam: S <=4    -  Cefazolin: S <=8    -  Cefepime: S <=4    -  Cefoxitin: S <=8    -  Ceftazidime: S <=1    -  Ceftriaxone: S <=1    -  Ciprofloxacin: S <=1    -  Ertapenem: S <=1    -  Gentamicin: S <=4    -  Imipenem: S <=1    -  Levofloxacin: S <=2    -  Meropenem: S <=1    -  Nitrofurantoin: S <=32    -  Piperacillin/Tazobactam: S <=16    -  Tobramycin: S <=4    -  Trimethoprim/Sulfamethoxazole: R >2/38    Specimen Source: .Urine Clean Catch (Midstream)    Culture Results:   10,000 - 49,000 CFU/mL Escherichia coli    Organism Identification: Escherichia coli    Organism: Escherichia coli    Method Type: SAVANNAH          03-05 @ 20:34 .Blood Blood-Peripheral     No growth at 48 hours

## 2018-03-08 NOTE — PROGRESS NOTE ADULT - ASSESSMENT
This 86 y.o. man here for WBC elevation, no fevers in ER, but positive UA, suspect UTI.   ALSO WITH DIARRHEA. WILL NEED TO R/O C DIFF IN VIEW OF RECENT LEVAQUIN USE. This 86 y.o. man here for WBC elevation, no fevers in ER, positive UA, low CFU counts in urine with E coli.   C DIFF testing negative.

## 2018-03-08 NOTE — PROGRESS NOTE ADULT - ASSESSMENT
1) ATN  2) UTI  3) Hypokalemia  4) Anemia  5) UTI  6) Rhabdo    Patient likely prerenal from UTI; decreased po; further evidence is his hypokalemia and low phosphorus;  This in combination with his NSAID use over the past week has likely tipped him into ATN given his significant rise in SCr  Would check UA; urine sodium, chloride, osm - ordered and pending  Renal sonogram reviewed  NS@75ml/hr  WBC improving  Strict IO; tovar in place  Avoid nephrotoxic agents ie NSAIDs  Pain Management consult requested  Will follow

## 2018-03-08 NOTE — PHYSICAL THERAPY INITIAL EVALUATION ADULT - ADDITIONAL COMMENTS
pt lives alone(wife passed away recently), pt was independent at home with functional mobility, 6 steps 1 rail to enter home, owns and uses a RW, owns a SAC, pt was at a subacute rehab prior to arrival

## 2018-03-08 NOTE — PROGRESS NOTE ADULT - ASSESSMENT
86 year old male with past medical history of spinal stenosis; Parkinson's ; BPH, HTN; Noel's esophagus; noted to have UTI in rehab center; started on levaquin 3/3/18. Patient accompanied by his son who states patient  took advil for the past week as well. Pt noted to have very high white blood cell count; tovar placed.  Patient most recently had flu in Feb 2018; treated at [facility].       >UTI: GNR, E coli,  ID following, ABX changed to Rocephin      >JETT:   likely combination of poor po intake,  Levaquin and diuretic use.  Continue  gentle hydration, Renal following, avoid nephrotoxic drugs.     >Dehydration: Continue IV fluids    > Elevated trop: likely renally related, Cardiology sign off    > Rhabdomyolysis: iv hydration, improved, follow repeat labs.     >Urinary Retention: Tovar, Flomax to 0.8. Will attempt TOV in am if Renal function continued to improve,  avoid constipation, PT for ambulation    >Parkinson's disease: levodopa/ carbidopa.    >Debility: Pain control, will add Tramadol, Lidoderm patches, Gabapentin resumed, renally dosed. Will obtain PM&R eval for daily PT recommendations.     dispo: PT eval 86 year old male with past medical history of spinal stenosis; Parkinson's ; BPH, HTN; Noel's esophagus; noted to have UTI in rehab center; started on levaquin 3/3/18. Patient accompanied by his son who states patient  took advil for the past week as well. Pt noted to have very high white blood cell count; tovar placed.  Patient most recently had flu in Feb 2018; treated at [facility].       >UTI: GNR, E coli,  ID following, ABX changed to Rocephin      >JETT:   likely combination of poor po intake,  NSAIDs and diuretic use.  Continue  gentle hydration, Renal following, avoid nephrotoxic drugs. monitor Urine output    >Dehydration: Continue IV fluids    > Elevated trop: likely renally related, Cardiology sign off    > Rhabdomyolysis: iv hydration, improved, follow repeat labs.     >Urinary Retention: Tovar, Flomax to 0.8. Will attempt TOV in am if Renal function continued to improve,  avoid constipation, PT for ambulation    >Parkinson's disease: levodopa/ carbidopa.    >Debility: Pain control, will add Tramadol, Lidoderm patches, Gabapentin resumed, renally dosed. Will obtain PM&R eval for daily PT recommendations.     dispo: PT eval

## 2018-03-08 NOTE — PROGRESS NOTE ADULT - SUBJECTIVE AND OBJECTIVE BOX
[facility] DIVISION OF KIDNEY DISEASES AND HYPERTENSION -- FOLLOW UP NOTE  --------------------------------------------------------------------------------  Chief Complaint: JETT    24 hour events/subjective:  Pt seen and examined today  Lying in bed, awake, NAD  Daughter at bedside  On IVF  Renal function improving        PAST HISTORY  --------------------------------------------------------------------------------  No significant changes to PMH, PSH, FHx, SHx, unless otherwise noted    ALLERGIES & MEDICATIONS  --------------------------------------------------------------------------------  Allergies    aspirin (Unknown)    Intolerances      Standing Inpatient Medications  ascorbic acid 250 milliGRAM(s) Oral daily  carbidopa/levodopa  25/250 1 Tablet(s) Oral three times a day  cefTRIAXone   IVPB 1 Gram(s) IV Intermittent every 24 hours  finasteride 5 milliGRAM(s) Oral daily  gabapentin 300 milliGRAM(s) Oral daily  heparin  Injectable 5000 Unit(s) SubCutaneous every 12 hours  lidocaine   Patch 1 Patch Transdermal daily  lidocaine   Patch 1 Patch Transdermal daily  multivitamin 1 Tablet(s) Oral daily  pantoprazole    Tablet 40 milliGRAM(s) Oral before breakfast  saccharomyces boulardii 250 milliGRAM(s) Oral two times a day  sodium chloride 0.9%. 1000 milliLiter(s) IV Continuous <Continuous>  tamsulosin 0.8 milliGRAM(s) Oral at bedtime    PRN Inpatient Medications  acetaminophen   Tablet 650 milliGRAM(s) Oral every 6 hours PRN  traMADol 25 milliGRAM(s) Oral every 8 hours PRN      REVIEW OF SYSTEMS  --------------------------------------------------------------------------------  Gen: No weight changes, fatigue, fevers/chills, weakness  Skin: No rashes  Head/Eyes/Ears/Mouth: No headache; Normal hearing; Normal vision w/o blurriness; No sinus pain/discomfort, sore throat  Respiratory: No dyspnea, cough, wheezing, hemoptysis  CV: No chest pain, PND, orthopnea  GI: No abdominal pain, diarrhea, constipation, nausea, vomiting, melena, hematochezia  : No increased frequency, dysuria, hematuria, nocturia  MSK: No joint pain/swelling; no back pain; no edema  Neuro: No dizziness/lightheadedness, weakness, seizures, numbness, tingling  Heme: No easy bruising or bleeding  Endo: No heat/cold intolerance  Psych: No significant nervousness, anxiety, stress, depression    All other systems were reviewed and are negative, except as noted.    VITALS/PHYSICAL EXAM  --------------------------------------------------------------------------------  T(C): 36.7 (03-08-18 @ 00:49), Max: 37 (03-07-18 @ 15:23)  HR: 75 (03-08-18 @ 00:49) (75 - 102)  BP: 128/74 (03-08-18 @ 00:49) (115/73 - 128/74)  RR: 18 (03-08-18 @ 00:49) (18 - 18)  SpO2: 99% (03-08-18 @ 00:49) (96% - 99%)  Wt(kg): --        03-07-18 @ 07:01  -  03-08-18 @ 07:00  --------------------------------------------------------  IN: 300 mL / OUT: 2450 mL / NET: -2150 mL      Physical Exam:  	Gen: NAD  	HEENT: PERRL, supple neck, clear oropharynx  	Pulm: CTA B/L  	CV: RRR, S1S2; no rub  	Back: No spinal or CVA tenderness; no sacral edema  	Abd: +BS, soft, nontender; dist+  	: No suprapubic tenderness  	UE: Warm, FROM, no clubbing, intact strength; no edema; no asterixis  	LE: Warm, FROM, no clubbing, intact strength; + edema  	Neuro: No focal deficits, intact gait  	Skin: Warm, without rashes      LABS/STUDIES  --------------------------------------------------------------------------------              11.5   14.0  >-----------<  235      [03-08-18 @ 06:45]              34.4     142  |  105  |  68.0  ----------------------------<  108      [03-08-18 @ 06:45]  3.7   |  23.0  |  3.16        Ca     9.0     [03-08-18 @ 06:45]      Mg     1.8     [03-08-18 @ 06:45]      Phos  3.4     [03-08-18 @ 06:45]      Troponin 0.05      [03-06-18 @ 21:36]        [03-06-18 @ 21:36]    Creatinine Trend:  SCr 3.16 [03-08 @ 06:45]  SCr 4.17 [03-07 @ 07:57]  SCr 4.77 [03-06 @ 12:05]  SCr 5.44 [03-05 @ 20:10]  SCr 1.48 [02-21 @ 08:24]    Urinalysis - [03-06-18 @ 00:13]      Color Yellow / Appearance very cloudy / SG 1.010 / pH 5.0      Gluc Negative / Ketone Negative  / Bili Negative / Urobili Negative       Blood Large / Protein 30 / Leuk Est Moderate / Nitrite Negative      RBC 25-50 / WBC 6-10 / Hyaline  / Gran  / Sq Epi  / Non Sq Epi Occasional / Bacteria Few      TSH 4.86      [03-05-18 @ 20:10]

## 2018-03-08 NOTE — PROGRESS NOTE ADULT - SUBJECTIVE AND OBJECTIVE BOX
CC: ABNORMAL LABS    INTERVAL HPI/OVERNIGHT EVENTS: Patient seen and examined. C/O B/L shoulder pain, as per daughter has had prior shoulder surgeries. Having difficulty getting up and sitting in chair due to pain. Denies chest pain, SOB, dizziness, lightheadedness, nausea, vomiting, fever, chills.     Vital Signs Last 24 Hrs  T(C): 36.7 (08 Mar 2018 00:49), Max: 37 (07 Mar 2018 15:23)  T(F): 98 (08 Mar 2018 00:49), Max: 98.6 (07 Mar 2018 15:23)  HR: 75 (08 Mar 2018 00:49) (75 - 102)  BP: 128/74 (08 Mar 2018 00:49) (115/73 - 128/74)  BP(mean): --  RR: 18 (08 Mar 2018 00:49) (18 - 18)  SpO2: 99% (08 Mar 2018 00:49) (96% - 99%)    PHYSICAL EXAM:  Constitutional: NAD   HEENT: PERRLA, EOMI, Normal Hearing, MMM  Neck: No LAD, No JVD  Respiratory: CTAB Cardiovascular: S1 and S2, RRR, no M/G/R  Gastrointestinal: BS+, soft, NT/ND  Extremities: No peripheral edema  Vascular: 2+ peripheral pulses  Neurological: A/O x 3,   Ramirez in place        I&O's Detail    07 Mar 2018 07:01  -  08 Mar 2018 07:00  --------------------------------------------------------  IN:    Oral Fluid: 300 mL  Total IN: 300 mL    OUT:    Indwelling Catheter - Urethral: 2450 mL  Total OUT: 2450 mL    Total NET: -2150 mL          CARDIAC MARKERS ( 06 Mar 2018 21:36 )  x     / 0.05 ng/mL / 479 U/L / x     / 7.8 ng/mL  CARDIAC MARKERS ( 06 Mar 2018 15:22 )  x     / 0.06 ng/mL / 591 U/L / x     / 11.2 ng/mL                            11.5   14.0  )-----------( 235      ( 08 Mar 2018 06:45 )             34.4     08 Mar 2018 06:45    142    |  105    |  68.0   ----------------------------<  108    3.7     |  23.0   |  3.16     Ca    9.0        08 Mar 2018 06:45  Phos  3.4       08 Mar 2018 06:45  Mg     1.8       08 Mar 2018 06:45        CAPILLARY BLOOD GLUCOSE              MEDICATIONS  (STANDING):  ascorbic acid 250 milliGRAM(s) Oral daily  carbidopa/levodopa  25/250 1 Tablet(s) Oral three times a day  cefTRIAXone   IVPB 1 Gram(s) IV Intermittent every 24 hours  finasteride 5 milliGRAM(s) Oral daily  gabapentin 300 milliGRAM(s) Oral daily  heparin  Injectable 5000 Unit(s) SubCutaneous every 12 hours  lidocaine   Patch 1 Patch Transdermal daily  lidocaine   Patch 1 Patch Transdermal daily  multivitamin 1 Tablet(s) Oral daily  pantoprazole    Tablet 40 milliGRAM(s) Oral before breakfast  saccharomyces boulardii 250 milliGRAM(s) Oral two times a day  sodium chloride 0.9%. 1000 milliLiter(s) (75 mL/Hr) IV Continuous <Continuous>  tamsulosin 0.8 milliGRAM(s) Oral at bedtime    MEDICATIONS  (PRN):  acetaminophen   Tablet 650 milliGRAM(s) Oral every 6 hours PRN For Temp greater than or equal to 38 C (100.4 F)  traMADol 25 milliGRAM(s) Oral every 8 hours PRN Moderate Pain (4 - 6)      RADIOLOGY & ADDITIONAL TESTS:

## 2018-03-08 NOTE — PHYSICAL THERAPY INITIAL EVALUATION ADULT - MANUAL MUSCLE TESTING RESULTS, REHAB EVAL
no strength deficits were identified/except gustabo shoulders 2/5, left hip and knee 2-/5, right hip and knee 3+/5

## 2018-03-08 NOTE — PHYSICAL THERAPY INITIAL EVALUATION ADULT - PERTINENT HX OF CURRENT PROBLEM, REHAB EVAL
pt presents to [facility] due to UTI, JETT, sepsis, rhabdomyolysis, hx of recent influenza

## 2018-03-08 NOTE — CONSULT NOTE ADULT - SUBJECTIVE AND OBJECTIVE BOX
cc: Rehab evaluation: 86y old  Male who presents with a chief complaint from skilled nursing facility with abnormal labs      HPI:  85 y/o male was sent in from his rehab where he was noted to have abnormal labs and leucocytosis. Patient was started on levaquin on 3/3/18 at nursing facility for possible UTI  He was reently admitted to [facility] in feb, 2018 and had influenza and was then sent to rehab. As per daughter pt. has urine frequency and pt. reports on and off dysuria and poor PO intake. At admission, patient with abnormal renal functions which has been attributed to recent NSAID use for bilateral shoulder pain and also PO poor intake. Patient seen by nephrology and also by ID. Rehab evaluation requested for further recommendations to prevent complications of immobility and post acute care. Antibiotics changed to Ceftriaxone. Per daughter patient has been c/o bilateral shoulder pain at the nursing home.         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 Patient was in SNF prior to this hospitalisation  STAIRS TO ENTER: 6 steps to enter with one rail.   FUNCTIONAL HISTORY: Per daughter patient and wife assisted each other with ADLs at home. Patient used a RW. At SNF , najma was being used.     Allergies    aspirin (Unknown)    Intolerances        MEDICATIONS  (STANDING):  ascorbic acid 250 milliGRAM(s) Oral daily  carbidopa/levodopa  25/250 1 Tablet(s) Oral three times a day  cefTRIAXone   IVPB 1 Gram(s) IV Intermittent every 24 hours  finasteride 5 milliGRAM(s) Oral daily  gabapentin 300 milliGRAM(s) Oral daily  heparin  Injectable 5000 Unit(s) SubCutaneous every 12 hours  lidocaine   Patch 1 Patch Transdermal daily  lidocaine   Patch 1 Patch Transdermal daily  multivitamin 1 Tablet(s) Oral daily  pantoprazole    Tablet 40 milliGRAM(s) Oral before breakfast  saccharomyces boulardii 250 milliGRAM(s) Oral two times a day  sodium chloride 0.9%. 1000 milliLiter(s) (75 mL/Hr) IV Continuous <Continuous>  tamsulosin 0.8 milliGRAM(s) Oral at bedtime    MEDICATIONS  (PRN):  acetaminophen   Tablet 650 milliGRAM(s) Oral every 6 hours PRN For Temp greater than or equal to 38 C (100.4 F)  traMADol 25 milliGRAM(s) Oral every 8 hours PRN Moderate Pain (4 - 6)      REVIEW OF SYSTEMS:    CONSTITUTIONAL: No fever,   EYES: No eye pain,   RESPIRATORY: No cough,   CARDIOVASCULAR: No chest pain,   GASTROINTESTINAL: No abdominal or epigastric pain.  GENITOURINARY: + Ramirez  NEUROLOGICAL: No headaches,  ENDOCRINE: No heat or cold intolerance;   MUSCULOSKELETAL: Bilateral shoulder pain - sharp  PSYCHIATRIC: Denies anxiety    Vital Signs Last 24 Hrs  T(C): 36.9 (08 Mar 2018 15:55), Max: 36.9 (08 Mar 2018 15:55)  T(F): 98.4 (08 Mar 2018 15:55), Max: 98.4 (08 Mar 2018 15:55)  HR: 76 (08 Mar 2018 15:55) (75 - 80)  BP: 133/75 (08 Mar 2018 15:55) (118/75 - 133/75)  BP(mean): --  RR: 18 (08 Mar 2018 15:55) (18 - 18)  SpO2: 96% (08 Mar 2018 15:55) (96% - 99%)    PHYSICAL EXAM:    GENERAL: NAD, aaox3  HEAD:  Atraumatic, Normocephalic  HEART: S1S2+  CHEST/LUNG: Clear   ABDOMEN: Soft,   EXTREMITIES:  no edema, no calf tenderness  motor UE 4/5, LE 3/5, Bilateral shoulder AROM limited abduction, PROM WFL  Sensory grossly intact to light touch    FUNCTIONAL EXAM: max assist x2 for transfers    LABS:                        11.5   14.0  )-----------( 235      ( 08 Mar 2018 06:45 )             34.4     03-08    142  |  105  |  68.0<H>  ----------------------------<  108  3.7   |  23.0  |  3.16<H>    Ca    9.0      08 Mar 2018 06:45  Phos  3.4     03-08  Mg     1.8     03-08  Creatine Kinase, Serum (03.06.18 @ 21:36)    Creatine Kinase, Serum: 479 U/L    RADIOLOGY & ADDITIONAL STUDIES:    A/P:    86 year old male with history as listed above along with PD, recent Flu ( Feb 2018), was in SNF, readmitted to hospital with UTI, Sepsis, JETT  BUN/creat improving slowly  Patient at risk for complications of immobility  Patient evaluated by PT today  Will start daily PT and OT to improve overall function, OOB/ADLs. D/W PT     2. Bilateral shoulder pain : Per daughter of recent onset- ? musculoskeletal versus actual joint pathology. Recommend shoulder xrays   Continue lidoderm patch, tylenol prn and trial of tramadol per Primary team    will follow and make further recommendations.

## 2018-03-08 NOTE — PHYSICAL THERAPY INITIAL EVALUATION ADULT - CRITERIA FOR SKILLED THERAPEUTIC INTERVENTIONS
therapy frequency/anticipated discharge recommendation/rehab potential/functional limitations in following categories/predicted duration of therapy intervention/impairments found

## 2018-03-09 LAB
ANION GAP SERPL CALC-SCNC: 14 MMOL/L — SIGNIFICANT CHANGE UP (ref 5–17)
BUN SERPL-MCNC: 56 MG/DL — HIGH (ref 8–20)
CALCIUM SERPL-MCNC: 8.8 MG/DL — SIGNIFICANT CHANGE UP (ref 8.6–10.2)
CHLORIDE SERPL-SCNC: 103 MMOL/L — SIGNIFICANT CHANGE UP (ref 98–107)
CO2 SERPL-SCNC: 23 MMOL/L — SIGNIFICANT CHANGE UP (ref 22–29)
CREAT SERPL-MCNC: 1.96 MG/DL — HIGH (ref 0.5–1.3)
GLUCOSE SERPL-MCNC: 106 MG/DL — SIGNIFICANT CHANGE UP (ref 70–115)
HCT VFR BLD CALC: 34 % — LOW (ref 42–52)
HGB BLD-MCNC: 11.3 G/DL — LOW (ref 14–18)
MAGNESIUM SERPL-MCNC: 1.6 MG/DL — SIGNIFICANT CHANGE UP (ref 1.6–2.6)
MCHC RBC-ENTMCNC: 30.5 PG — SIGNIFICANT CHANGE UP (ref 27–31)
MCHC RBC-ENTMCNC: 33.2 G/DL — SIGNIFICANT CHANGE UP (ref 32–36)
MCV RBC AUTO: 91.6 FL — SIGNIFICANT CHANGE UP (ref 80–94)
PHOSPHATE SERPL-MCNC: 3.3 MG/DL — SIGNIFICANT CHANGE UP (ref 2.4–4.7)
PLATELET # BLD AUTO: 232 K/UL — SIGNIFICANT CHANGE UP (ref 150–400)
POTASSIUM SERPL-MCNC: 3.5 MMOL/L — SIGNIFICANT CHANGE UP (ref 3.5–5.3)
POTASSIUM SERPL-SCNC: 3.5 MMOL/L — SIGNIFICANT CHANGE UP (ref 3.5–5.3)
RBC # BLD: 3.71 M/UL — LOW (ref 4.6–6.2)
RBC # FLD: 13.6 % — SIGNIFICANT CHANGE UP (ref 11–15.6)
SODIUM SERPL-SCNC: 140 MMOL/L — SIGNIFICANT CHANGE UP (ref 135–145)
WBC # BLD: 12 K/UL — HIGH (ref 4.8–10.8)
WBC # FLD AUTO: 12 K/UL — HIGH (ref 4.8–10.8)

## 2018-03-09 PROCEDURE — 99232 SBSQ HOSP IP/OBS MODERATE 35: CPT

## 2018-03-09 PROCEDURE — 99233 SBSQ HOSP IP/OBS HIGH 50: CPT

## 2018-03-09 PROCEDURE — 99232 SBSQ HOSP IP/OBS MODERATE 35: CPT | Mod: GC

## 2018-03-09 RX ADMIN — TRAMADOL HYDROCHLORIDE 25 MILLIGRAM(S): 50 TABLET ORAL at 12:08

## 2018-03-09 RX ADMIN — TRAMADOL HYDROCHLORIDE 25 MILLIGRAM(S): 50 TABLET ORAL at 00:37

## 2018-03-09 RX ADMIN — Medication 250 MILLIGRAM(S): at 17:08

## 2018-03-09 RX ADMIN — GABAPENTIN 300 MILLIGRAM(S): 400 CAPSULE ORAL at 12:09

## 2018-03-09 RX ADMIN — TRAMADOL HYDROCHLORIDE 25 MILLIGRAM(S): 50 TABLET ORAL at 13:00

## 2018-03-09 RX ADMIN — Medication 250 MILLIGRAM(S): at 05:02

## 2018-03-09 RX ADMIN — FINASTERIDE 5 MILLIGRAM(S): 5 TABLET, FILM COATED ORAL at 12:09

## 2018-03-09 RX ADMIN — TRAMADOL HYDROCHLORIDE 25 MILLIGRAM(S): 50 TABLET ORAL at 22:31

## 2018-03-09 RX ADMIN — PANTOPRAZOLE SODIUM 40 MILLIGRAM(S): 20 TABLET, DELAYED RELEASE ORAL at 05:02

## 2018-03-09 RX ADMIN — LIDOCAINE 1 PATCH: 4 CREAM TOPICAL at 12:15

## 2018-03-09 RX ADMIN — SODIUM CHLORIDE 75 MILLILITER(S): 9 INJECTION INTRAMUSCULAR; INTRAVENOUS; SUBCUTANEOUS at 12:07

## 2018-03-09 RX ADMIN — LIDOCAINE 1 PATCH: 4 CREAM TOPICAL at 23:47

## 2018-03-09 RX ADMIN — HEPARIN SODIUM 5000 UNIT(S): 5000 INJECTION INTRAVENOUS; SUBCUTANEOUS at 05:01

## 2018-03-09 RX ADMIN — CARBIDOPA AND LEVODOPA 1 TABLET(S): 25; 100 TABLET ORAL at 15:04

## 2018-03-09 RX ADMIN — TRAMADOL HYDROCHLORIDE 25 MILLIGRAM(S): 50 TABLET ORAL at 23:31

## 2018-03-09 RX ADMIN — TRAMADOL HYDROCHLORIDE 25 MILLIGRAM(S): 50 TABLET ORAL at 01:37

## 2018-03-09 RX ADMIN — Medication 1 TABLET(S): at 12:09

## 2018-03-09 RX ADMIN — TAMSULOSIN HYDROCHLORIDE 0.8 MILLIGRAM(S): 0.4 CAPSULE ORAL at 22:22

## 2018-03-09 RX ADMIN — CEFTRIAXONE 100 GRAM(S): 500 INJECTION, POWDER, FOR SOLUTION INTRAMUSCULAR; INTRAVENOUS at 12:09

## 2018-03-09 RX ADMIN — SODIUM CHLORIDE 75 MILLILITER(S): 9 INJECTION INTRAMUSCULAR; INTRAVENOUS; SUBCUTANEOUS at 00:49

## 2018-03-09 RX ADMIN — HEPARIN SODIUM 5000 UNIT(S): 5000 INJECTION INTRAVENOUS; SUBCUTANEOUS at 17:08

## 2018-03-09 RX ADMIN — CARBIDOPA AND LEVODOPA 1 TABLET(S): 25; 100 TABLET ORAL at 05:02

## 2018-03-09 RX ADMIN — CARBIDOPA AND LEVODOPA 1 TABLET(S): 25; 100 TABLET ORAL at 22:22

## 2018-03-09 NOTE — PROGRESS NOTE ADULT - ASSESSMENT
1) Recovering JETT; prerenal/ATN  2) UTI  3) Hypokalemia  4) Anemia  5) UTI  6) Rhabdo    Patient likely prerenal from UTI; decreased po; further evidence is his hypokalemia and low phosphorus;  Renal fucntion improving significantly  Renal sonogram reviewed  NS@75ml/hr  WBC improving  Strict IO; tovar in place  Avoid nephrotoxic agents ie NSAIDs  Will follow

## 2018-03-09 NOTE — PROGRESS NOTE ADULT - PROBLEM SELECTOR PLAN 1
Complicated UTI   E coli susceptible to multiple agents.   contiue Ceftriaxone. Complicated UTI   E coli susceptible to multiple agents.   continue Ceftriaxone; last dose on 3/12/18

## 2018-03-09 NOTE — PROGRESS NOTE ADULT - SUBJECTIVE AND OBJECTIVE BOX
[facility]  INFECTIOUS DISEASES AND INTERNAL MEDICINE at [place]  =======================================================  Antwon Woods MD  Diplomates American Board of Internal Medicine and Infectious Diseases  =======================================================  JAH WARD [id number]  follow up on WBC elevation, r/o sepsis  patient seen and examined in follow up.  Chart and labs reviewed.   no new complaints    =======================================================  Allergies:  aspirin (Unknown)    =======================================================  MEDICATIONS  (STANDING):  ascorbic acid 250 milliGRAM(s) Oral daily  carbidopa/levodopa  25/250 1 Tablet(s) Oral three times a day  cefTRIAXone   IVPB 1 Gram(s) IV Intermittent every 24 hours  finasteride 5 milliGRAM(s) Oral daily  gabapentin 300 milliGRAM(s) Oral daily  heparin  Injectable 5000 Unit(s) SubCutaneous every 12 hours  lidocaine   Patch 1 Patch Transdermal daily  lidocaine   Patch 1 Patch Transdermal daily  multivitamin 1 Tablet(s) Oral daily  pantoprazole    Tablet 40 milliGRAM(s) Oral before breakfast  saccharomyces boulardii 250 milliGRAM(s) Oral two times a day  sodium chloride 0.9%. 1000 milliLiter(s) (75 mL/Hr) IV Continuous <Continuous>  tamsulosin 0.8 milliGRAM(s) Oral at bedtime     =======================================================     REVIEW OF SYSTEMS:  CONSTITUTIONAL:  No Fever or chills  HEENT:   No diplopia or blurred vision.  No earache, sore throat or runny nose.  CARDIOVASCULAR:  No pressure, squeezing, strangling, tightness, heaviness or aching about the chest, neck, axilla or epigastrium.  RESPIRATORY:  No cough, shortness of breath, PND or orthopnea.  GASTROINTESTINAL:  No nausea, vomiting or diarrhea.  GENITOURINARY:  No dysuria, frequency or urgency. No Blood in urine  MUSCULOSKELETAL:  no joint aches, no muscle pain  SKIN:  No change in skin, hair or nails.  NEUROLOGIC:  No paresthesias, fasciculations, seizures or weakness.  PSYCHIATRIC:  No disorder of thought or mood.  ENDOCRINE:  No heat or cold intolerance, polyuria or polydipsia.  HEMATOLOGICAL:  No easy bruising or bleeding.     =======================================================    Physical Exam:  Vital Signs Last 24 Hrs  T(C): 36.4 (09 Mar 2018 08:25), Max: 36.9 (08 Mar 2018 15:55)  T(F): 97.6 (09 Mar 2018 08:25), Max: 98.4 (08 Mar 2018 15:55)  HR: 73 (09 Mar 2018 08:25) (71 - 76)  BP: 124/69 (09 Mar 2018 08:25) (124/69 - 133/75)  RR: 18 (09 Mar 2018 08:25) (18 - 18)  SpO2: 98% (09 Mar 2018 08:25) (96% - 98%)    GEN: NAD, pleasant  HEENT: normocephalic and atraumatic. EOMI. GERMAN.    NECK: Supple. No carotid bruits.  No lymphadenopathy or thyromegaly.  LUNGS: Clear to auscultation.  HEART: Regular rate and rhythm without murmur.  ABDOMEN: Soft, nontender, and nondistended.  Positive bowel sounds.    :  ***   EXTREMITIES: Without any cyanosis, clubbing, rash, lesions or edema.  MSK: no joint swelling  NEUROLOGIC: Cranial nerves II through XII are grossly intact.  PSYCHIATRIC: Appropriate affect .  SKIN: No ulceration or induration present.     =======================================================      Labs:  03-09    140  |  103  |  56.0<H>  ----------------------------<  106  3.5   |  23.0  |  1.96<H>    Ca    8.8      09 Mar 2018 06:39  Phos  3.3     03-09  Mg     1.6     03-09                            11.3   12.0  )-----------( 232      ( 09 Mar 2018 06:39 )             34.0         RECENT CULTURES:  03-06 @ 11:32 .Stool Feces     No enteric pathogens isolated.  (Stool culture examined for Salmonella,  Shigella, Campylobacter, Aeromonas, Plesiomonas,  Vibrio, E.coli O157 and Yersinia)        03-06 @ 05:47      C diff  NotDetec      03-06 @ 00:13 .Urine Clean Catch (Midstream) Escherichia coli    10,000 - 49,000 CFU/mL Escherichia coli  Culture - Urine (03.06.18 @ 00:13)    -  Amikacin: S <=16    -  Ampicillin: R >16    -  Ampicillin/Sulbactam: I 16/8    -  Aztreonam: S <=4    -  Cefazolin: S <=8    -  Cefepime: S <=4    -  Cefoxitin: S <=8    -  Ceftazidime: S <=1    -  Ceftriaxone: S <=1    -  Ciprofloxacin: S <=1    -  Ertapenem: S <=1    -  Gentamicin: S <=4    -  Imipenem: S <=1    -  Levofloxacin: S <=2    -  Meropenem: S <=1    -  Nitrofurantoin: S <=32    -  Piperacillin/Tazobactam: S <=16    -  Tobramycin: S <=4    -  Trimethoprim/Sulfamethoxazole: R >2/38    Specimen Source: .Urine Clean Catch (Midstream)       03-05 @ 20:34 .Blood Blood-Peripheral     No growth at 48 hours [facility]  INFECTIOUS DISEASES AND INTERNAL MEDICINE at [place]  =======================================================  Antwon Woods MD  Diplomates American Board of Internal Medicine and Infectious Diseases  =======================================================  EDJAH MUNOZ [id number]  follow up on WBC elevation, r/o sepsis  patient seen and examined in follow up.  Chart and labs reviewed.   no new complaints except feeling tired.       =======================================================  Allergies:  aspirin (Unknown)    =======================================================  MEDICATIONS  (STANDING):  ascorbic acid 250 milliGRAM(s) Oral daily  carbidopa/levodopa  25/250 1 Tablet(s) Oral three times a day  cefTRIAXone   IVPB 1 Gram(s) IV Intermittent every 24 hours  finasteride 5 milliGRAM(s) Oral daily  gabapentin 300 milliGRAM(s) Oral daily  heparin  Injectable 5000 Unit(s) SubCutaneous every 12 hours  lidocaine   Patch 1 Patch Transdermal daily  lidocaine   Patch 1 Patch Transdermal daily  multivitamin 1 Tablet(s) Oral daily  pantoprazole    Tablet 40 milliGRAM(s) Oral before breakfast  saccharomyces boulardii 250 milliGRAM(s) Oral two times a day  sodium chloride 0.9%. 1000 milliLiter(s) (75 mL/Hr) IV Continuous <Continuous>  tamsulosin 0.8 milliGRAM(s) Oral at bedtime     =======================================================     REVIEW OF SYSTEMS:  AS ABOVE all other ROS negative.     =======================================================    Physical Exam:  Vital Signs Last 24 Hrs  T(C): 36.4 (09 Mar 2018 08:25), Max: 36.9 (08 Mar 2018 15:55)  T(F): 97.6 (09 Mar 2018 08:25), Max: 98.4 (08 Mar 2018 15:55)  HR: 73 (09 Mar 2018 08:25) (71 - 76)  BP: 124/69 (09 Mar 2018 08:25) (124/69 - 133/75)  RR: 18 (09 Mar 2018 08:25) (18 - 18)  SpO2: 98% (09 Mar 2018 08:25) (96% - 98%)    GEN: NAD, pleasant  HEENT: normocephalic and atraumatic. EOMI. GERMAN.    NECK: Supple. No carotid bruits.  No lymphadenopathy or thyromegaly.  LUNGS: Clear to auscultation.  HEART: Regular rate and rhythm without murmur.  ABDOMEN: Soft, nontender, and nondistended.  Positive bowel sounds.    :  FOX removed  EXTREMITIES: Without any cyanosis, clubbing, rash, lesions or edema.  MSK: no joint swelling  NEUROLOGIC: Cranial nerves II through XII are grossly intact.  PSYCHIATRIC: Appropriate affect .  SKIN: No ulceration or induration present.     =======================================================      Labs:  03-09    140  |  103  |  56.0<H>  ----------------------------<  106  3.5   |  23.0  |  1.96<H>    Ca    8.8      09 Mar 2018 06:39  Phos  3.3     03-09  Mg     1.6     03-09                            11.3   12.0  )-----------( 232      ( 09 Mar 2018 06:39 )             34.0         RECENT CULTURES:  03-06 @ 11:32 .Stool Feces     No enteric pathogens isolated.  (Stool culture examined for Salmonella,  Shigella, Campylobacter, Aeromonas, Plesiomonas,  Vibrio, E.coli O157 and Yersinia)        03-06 @ 05:47      C diff  NotDetec      03-06 @ 00:13 .Urine Clean Catch (Midstream) Escherichia coli    10,000 - 49,000 CFU/mL Escherichia coli  Culture - Urine (03.06.18 @ 00:13)    -  Amikacin: S <=16    -  Ampicillin: R >16    -  Ampicillin/Sulbactam: I 16/8    -  Aztreonam: S <=4    -  Cefazolin: S <=8    -  Cefepime: S <=4    -  Cefoxitin: S <=8    -  Ceftazidime: S <=1    -  Ceftriaxone: S <=1    -  Ciprofloxacin: S <=1    -  Ertapenem: S <=1    -  Gentamicin: S <=4    -  Imipenem: S <=1    -  Levofloxacin: S <=2    -  Meropenem: S <=1    -  Nitrofurantoin: S <=32    -  Piperacillin/Tazobactam: S <=16    -  Tobramycin: S <=4    -  Trimethoprim/Sulfamethoxazole: R >2/38    Specimen Source: .Urine Clean Catch (Midstream)       03-05 @ 20:34 .Blood Blood-Peripheral     No growth at 48 hours

## 2018-03-09 NOTE — PROGRESS NOTE ADULT - PROBLEM SELECTOR PROBLEM 3
Parkinson disease
Acute renal failure, unspecified acute renal failure type
Acute renal failure, unspecified acute renal failure type

## 2018-03-09 NOTE — PROGRESS NOTE ADULT - PROBLEM SELECTOR PROBLEM 1
Urinary tract infection without hematuria, site unspecified
Urinary tract infection without hematuria, site unspecified
R/O Urinary tract infection without hematuria, site unspecified

## 2018-03-09 NOTE — PROGRESS NOTE ADULT - ASSESSMENT
86 year old male with past medical history of spinal stenosis; Parkinson's ; BPH, HTN; Noel's esophagus; noted to have UTI in rehab center; started on levaquin 3/3/18. Patient accompanied by his son who states patient  took advil for the past week as well. Pt noted to have very high white blood cell count; tovar d/c'd today TOV, bladder scan 301ml retained, straight cath.  Patient most recently had flu in Feb 2018; treated at [facility].     >UTI: GNR, E coli,  ID following, Continue with Rocephin      >JETT:   likely combination of poor po intake,  NSAIDs and diuretic use.  Continue  gentle hydration, renal function improving, Renal following, avoid nephrotoxic drugs. monitor Urine output, tovar d/c'd today  TOV, bladder scan 301ml retained, straight cath, repeat bladder scan in 6 hours.    >Dehydration: Continue IV fluids    > Elevated trop: likely renally related, Cardiology sign off    > Rhabdomyolysis: iv hydration, improved, follow repeat labs.     >Urinary Retention: Flomax to 0.8.  Tovar d/c'd, TOV, avoid constipation, PT for ambulation    >Parkinson's disease: levodopa/ carbidopa.    >Debility: Pain control, continue Tramadol, Lidoderm patches, Gabapentin resumed, renally dosed.  PM&R consult appreciated     dispo: PT recommend [facility]

## 2018-03-09 NOTE — PROGRESS NOTE ADULT - PROBLEM SELECTOR PROBLEM 2
Acute renal failure, unspecified acute renal failure type
Diarrhea of presumed infectious origin
Diarrhea of presumed infectious origin

## 2018-03-09 NOTE — PROGRESS NOTE ADULT - ASSESSMENT
This 86 y.o. man here for WBC elevation, no fevers in ER, positive UA, low CFU counts in urine with E coli.   C DIFF testing negative.

## 2018-03-09 NOTE — PROGRESS NOTE ADULT - SUBJECTIVE AND OBJECTIVE BOX
[facility] DIVISION OF KIDNEY DISEASES AND HYPERTENSION -- FOLLOW UP NOTE  --------------------------------------------------------------------------------  Chief Complaint: JETT prerenal    24 hour events/subjective:  Pt seen and examined at bedside  No complaints  Renal fx improving    PAST HISTORY  --------------------------------------------------------------------------------  No significant changes to PMH, PSH, FHx, SHx, unless otherwise noted    ALLERGIES & MEDICATIONS  --------------------------------------------------------------------------------  Allergies    aspirin (Unknown)    Intolerances      Standing Inpatient Medications  ascorbic acid 250 milliGRAM(s) Oral daily  carbidopa/levodopa  25/250 1 Tablet(s) Oral three times a day  cefTRIAXone   IVPB 1 Gram(s) IV Intermittent every 24 hours  finasteride 5 milliGRAM(s) Oral daily  gabapentin 300 milliGRAM(s) Oral daily  heparin  Injectable 5000 Unit(s) SubCutaneous every 12 hours  lidocaine   Patch 1 Patch Transdermal daily  lidocaine   Patch 1 Patch Transdermal daily  multivitamin 1 Tablet(s) Oral daily  pantoprazole    Tablet 40 milliGRAM(s) Oral before breakfast  saccharomyces boulardii 250 milliGRAM(s) Oral two times a day  sodium chloride 0.9%. 1000 milliLiter(s) IV Continuous <Continuous>  tamsulosin 0.8 milliGRAM(s) Oral at bedtime    PRN Inpatient Medications  acetaminophen   Tablet 650 milliGRAM(s) Oral every 6 hours PRN  traMADol 25 milliGRAM(s) Oral every 8 hours PRN      REVIEW OF SYSTEMS  --------------------------------------------------------------------------------  Gen: No weight changes, fatigue, fevers/chills, weakness  Skin: No rashes  Head/Eyes/Ears/Mouth: No headache; Normal hearing; Normal vision w/o blurriness; No sinus pain/discomfort, sore throat  Respiratory: No dyspnea, cough, wheezing, hemoptysis  CV: No chest pain, PND, orthopnea  GI: No abdominal pain, diarrhea, constipation, nausea, vomiting, melena, hematochezia  : No increased frequency, dysuria, hematuria, nocturia  MSK: No joint pain/swelling; no back pain; no edema  Neuro: No dizziness/lightheadedness, weakness, seizures, numbness, tingling  Heme: No easy bruising or bleeding  Endo: No heat/cold intolerance  Psych: No significant nervousness, anxiety, stress, depression    All other systems were reviewed and are negative, except as noted.    VITALS/PHYSICAL EXAM  --------------------------------------------------------------------------------  T(C): 36.4 (03-09-18 @ 08:25), Max: 36.9 (03-08-18 @ 15:55)  HR: 73 (03-09-18 @ 08:25) (71 - 76)  BP: 124/69 (03-09-18 @ 08:25) (124/69 - 133/75)  RR: 18 (03-09-18 @ 08:25) (18 - 18)  SpO2: 98% (03-09-18 @ 08:25) (96% - 98%)  Wt(kg): --        03-08-18 @ 07:01  -  03-09-18 @ 07:00  --------------------------------------------------------  IN: 900 mL / OUT: 2000 mL / NET: -1100 mL      Physical Exam:  	Gen: NAD  	HEENT: PERRL, supple neck, clear oropharynx  	Pulm: CTA B/L  	CV: RRR, S1S2; no rub  	Back: No spinal or CVA tenderness; no sacral edema  	Abd: +BS, soft, nontender; dist+  	: No suprapubic tenderness  	UE: Warm, FROM, no clubbing, intact strength; no edema; no asterixis  	LE: Warm, FROM, no clubbing, intact strength; + edema  	Neuro: No focal deficits, intact gait  	Skin: Warm, without rashes        LABS/STUDIES  --------------------------------------------------------------------------------              11.3   12.0  >-----------<  232      [03-09-18 @ 06:39]              34.0     140  |  103  |  56.0  ----------------------------<  106      [03-09-18 @ 06:39]  3.5   |  23.0  |  1.96        Ca     8.8     [03-09-18 @ 06:39]      Mg     1.6     [03-09-18 @ 06:39]      Phos  3.3     [03-09-18 @ 06:39]            Creatinine Trend:  SCr 1.96 [03-09 @ 06:39]  SCr 3.16 [03-08 @ 06:45]  SCr 4.17 [03-07 @ 07:57]  SCr 4.77 [03-06 @ 12:05]  SCr 5.44 [03-05 @ 20:10]    Urinalysis - [03-06-18 @ 00:13]      Color Yellow / Appearance very cloudy / SG 1.010 / pH 5.0      Gluc Negative / Ketone Negative  / Bili Negative / Urobili Negative       Blood Large / Protein 30 / Leuk Est Moderate / Nitrite Negative      RBC 25-50 / WBC 6-10 / Hyaline  / Gran  / Sq Epi  / Non Sq Epi Occasional / Bacteria Few      TSH 4.86      [03-05-18 @ 20:10]

## 2018-03-09 NOTE — PROGRESS NOTE ADULT - PROBLEM SELECTOR PLAN 2
Ramirez in place, gentle hydration, monitor renal function, avoid nephrotoxins.
C diff negative,   will follow cultures
C diff negative, stool cultures negative for pathogens

## 2018-03-09 NOTE — PROGRESS NOTE ADULT - SUBJECTIVE AND OBJECTIVE BOX
CC: ABNORMAL LABS/ARF    INTERVAL HPI/OVERNIGHT EVENTS: Patient seen and examined, lying in bed. No acute events overnight. Ramirez to be d/c'd today, TOV, bladder scan with 301ml, straight cath, repeat bladder scan in 6 hours. Shoulder pain controlled on tramadol.  Denies chest pain, SOB, dizziness, lightheadedness, nausea, vomiting, fever, chills      Vital Signs Last 24 Hrs  T(C): 36.4 (09 Mar 2018 08:25), Max: 36.9 (08 Mar 2018 15:55)  T(F): 97.6 (09 Mar 2018 08:25), Max: 98.4 (08 Mar 2018 15:55)  HR: 73 (09 Mar 2018 08:25) (71 - 76)  BP: 124/69 (09 Mar 2018 08:25) (124/69 - 133/75)  BP(mean): --  RR: 18 (09 Mar 2018 08:25) (18 - 18)  SpO2: 98% (09 Mar 2018 08:25) (96% - 98%)    LABS:                        11.3   12.0  )-----------( 232      ( 09 Mar 2018 06:39 )             34.0     03-09    140  |  103  |  56.0<H>  ----------------------------<  106  3.5   |  23.0  |  1.96<H>    Ca    8.8      09 Mar 2018 06:39  Phos  3.3     03-09  Mg     1.6     03-09      PHYSICAL EXAM:    General: NAD  Eyes: PERRL, EOM intact; conjunctiva and sclera clear  Head: Normocephalic; atraumatic  Neck: Supple; non tender; No JVD: no masses  Respiratory: Clear to ausculation B/L, diminished at bases B/L; No wheezes, rales or rhonchi  Cardiovascular: Regular rate and rhythm. S1 and S2 Normal; No murmurs, gallops or rubs  Gastrointestinal: Soft non-tender non-distended; Normal bowel sounds  Genitourinary: No costovertebral angle tenderness  Extremities: B/L LE edema +1; No clubbing, cyanosis  Vascular: Peripheral pulses palpable 2+ bilaterally  Neurological: Alert and oriented x4  Skin: Warm and dry. No acute rash  Psychiatric: Cooperative and appropriate        MEDICATIONS  (STANDING):  ascorbic acid 250 milliGRAM(s) Oral daily  carbidopa/levodopa  25/250 1 Tablet(s) Oral three times a day  cefTRIAXone   IVPB 1 Gram(s) IV Intermittent every 24 hours  finasteride 5 milliGRAM(s) Oral daily  gabapentin 300 milliGRAM(s) Oral daily  heparin  Injectable 5000 Unit(s) SubCutaneous every 12 hours  lidocaine   Patch 1 Patch Transdermal daily  lidocaine   Patch 1 Patch Transdermal daily  multivitamin 1 Tablet(s) Oral daily  pantoprazole    Tablet 40 milliGRAM(s) Oral before breakfast  saccharomyces boulardii 250 milliGRAM(s) Oral two times a day  sodium chloride 0.9%. 1000 milliLiter(s) (75 mL/Hr) IV Continuous <Continuous>  tamsulosin 0.8 milliGRAM(s) Oral at bedtime    MEDICATIONS  (PRN):  acetaminophen   Tablet 650 milliGRAM(s) Oral every 6 hours PRN For Temp greater than or equal to 38 C (100.4 F)  traMADol 25 milliGRAM(s) Oral every 8 hours PRN Moderate Pain (4 - 6)      RADIOLOGY & ADDITIONAL TESTS:  EXAM:  SHOULDER COMP  MIN 2 VIEWS-BI                          PROCEDURE DATE:  03/08/2018          INTERPRETATION:  X-RAYS OF BILATERAL SHOULDER:    History: Status post bilateral shoulder replacements. Patient complains   of shoulder pain bilaterally.    Date and time of exam: 3/8/2018 2:49 PM.    Technique: 3 views of the shoulder were obtained were obtained   bilaterally.    Findings: Bilateral shoulder prostheses are noted. No evidence of   fracture or dislocation. Alignment appears anatomic bilaterally..    Impression:  Status post bilateral shoulder replacements with anatomic alignment. No   evidence of periprosthetic fracture and no evidence of dislocation..                AGNIESZKA HARDY M.D., ATTENDING RADIOLOGIST  This document has been electronically signed. Mar  8 2018  3:24PM

## 2018-03-09 NOTE — PROGRESS NOTE ADULT - SUBJECTIVE AND OBJECTIVE BOX
INTERVAL HISTORY  No acute events overnight. Discussed discharge planning with patient and daughter: they were happy with the therapy received when they were at [facility] and would not be opposed to returning there after his current hospital course. Today, he reports that pain is well controlled.    CURRENT FUNCTIONAL STATUS  Bed mobility: mod assist x2  Transfers: sit-to-stand max assist x2  Gait: unable to assess    REVIEW OF SYSTEMS  Constitutional - No fever, No weight loss, No fatigue  HEENT - No eye pain, No visual disturbances, No difficulty hearing, No tinnitus, No vertigo, No neck pain  Neurological - No headaches, No memory loss, No loss of strength, No numbness, No tremors  Skin - No itching, No rashes, No lesions   Musculoskeletal - No joint pain, No joint swelling, No muscle pain  Psychiatric - No depression, No anxiety      VITALS  T(C): 36.4 (03-09-18 @ 08:25), Max: 36.9 (03-08-18 @ 15:55)  HR: 73 (03-09-18 @ 08:25) (71 - 76)  BP: 124/69 (03-09-18 @ 08:25) (124/69 - 133/75)  RR: 18 (03-09-18 @ 08:25) (18 - 18)  SpO2: 98% (03-09-18 @ 08:25) (96% - 98%)  Wt(kg): --      PHYSICAL EXAM  GENERAL: NAD, A&Ox3  HEAD:  Atraumatic, Normocephalic  HEART: S1/S2 present  CHEST/LUNG: Breathing comfortably without distress  ABDOMEN: Soft, nontender  EXTREMITIES:  no edema, no calf tenderness  NEURO: Sensory grossly intact to light touch  MOTOR:  B/L UE 4/5, B/L LE 3/5  Bilateral shoulder AROM limited abduction, PROM WFL      RECENT LABS/IMAGING  CBC Full  -  ( 09 Mar 2018 06:39 )  WBC Count : 12.0 K/uL  Hemoglobin : 11.3 g/dL  Hematocrit : 34.0 %  Platelet Count - Automated : 232 K/uL  Mean Cell Volume : 91.6 fl  Mean Cell Hemoglobin : 30.5 pg  Mean Cell Hemoglobin Concentration : 33.2 g/dL  Auto Neutrophil # : x  Auto Lymphocyte # : x  Auto Monocyte # : x  Auto Eosinophil # : x  Auto Basophil # : x  Auto Neutrophil % : x  Auto Lymphocyte % : x  Auto Monocyte % : x  Auto Eosinophil % : x  Auto Basophil % : x    03-09    140  |  103  |  56.0<H>  ----------------------------<  106  3.5   |  23.0  |  1.96<H>    Ca    8.8      09 Mar 2018 06:39  Phos  3.3     03-09  Mg     1.6     03-09      MEDICATIONS   acetaminophen   Tablet 650 milliGRAM(s) every 6 hours PRN  ascorbic acid 250 milliGRAM(s) daily  carbidopa/levodopa  25/250 1 Tablet(s) three times a day  cefTRIAXone   IVPB 1 Gram(s) every 24 hours  finasteride 5 milliGRAM(s) daily  gabapentin 300 milliGRAM(s) daily  heparin  Injectable 5000 Unit(s) every 12 hours  lidocaine   Patch 1 Patch daily  lidocaine   Patch 1 Patch daily  multivitamin 1 Tablet(s) daily  pantoprazole    Tablet 40 milliGRAM(s) before breakfast  saccharomyces boulardii 250 milliGRAM(s) two times a day  sodium chloride 0.9%. 1000 milliLiter(s) <Continuous>  tamsulosin 0.8 milliGRAM(s) at bedtime  traMADol 25 milliGRAM(s) every 8 hours PRN      --------------------------------------------------------------------      86M with functional deficits s/p recent flu now admitted for acute renal failure with rhabdomyolysis in setting of UTI/sepsis.  ~Medical management as per primary team  ~Pain management (Lidocaine patch, tylenol/tramadol PRN)  ~When medically stable, recommend discharge to [facility]. INTERVAL HISTORY  No acute events overnight. Discussed discharge planning with patient and daughter: they were happy with the therapy received when they were at [facility] and would not be opposed to returning there after his current hospital course. Today, he reports that pain is well controlled.    CURRENT FUNCTIONAL STATUS  Bed mobility: mod assist x2  Transfers: sit-to-stand max assist x2  Gait: unable to assess    REVIEW OF SYSTEMS  Constitutional - No fever, No weight loss, No fatigue  HEENT - No eye pain, No visual disturbances, No difficulty hearing, No tinnitus, No vertigo, No neck pain  Neurological - No headaches, No memory loss, No loss of strength, No numbness, No tremors  Skin - No itching, No rashes, No lesions   Musculoskeletal - No joint pain, No joint swelling, No muscle pain  Psychiatric - No depression, No anxiety      VITALS  T(C): 36.4 (03-09-18 @ 08:25), Max: 36.9 (03-08-18 @ 15:55)  HR: 73 (03-09-18 @ 08:25) (71 - 76)  BP: 124/69 (03-09-18 @ 08:25) (124/69 - 133/75)  RR: 18 (03-09-18 @ 08:25) (18 - 18)  SpO2: 98% (03-09-18 @ 08:25) (96% - 98%)  Wt(kg): --      PHYSICAL EXAM  GENERAL: NAD, A&Ox3  HEAD:  Atraumatic, Normocephalic  HEART: S1/S2 present  CHEST/LUNG: Breathing comfortably without distress  ABDOMEN: Soft, nontender  EXTREMITIES:  no edema, no calf tenderness  NEURO: Sensory grossly intact to light touch  MOTOR:  B/L UE 4/5, B/L LE 3/5  Bilateral shoulder AROM limited abduction, PROM WFL      RECENT LABS/IMAGING  CBC Full  -  ( 09 Mar 2018 06:39 )  WBC Count : 12.0 K/uL  Hemoglobin : 11.3 g/dL  Hematocrit : 34.0 %  Platelet Count - Automated : 232 K/uL  Mean Cell Volume : 91.6 fl  Mean Cell Hemoglobin : 30.5 pg  Mean Cell Hemoglobin Concentration : 33.2 g/dL  Auto Neutrophil # : x  Auto Lymphocyte # : x  Auto Monocyte # : x  Auto Eosinophil # : x  Auto Basophil # : x  Auto Neutrophil % : x  Auto Lymphocyte % : x  Auto Monocyte % : x  Auto Eosinophil % : x  Auto Basophil % : x    03-09    140  |  103  |  56.0<H>  ----------------------------<  106  3.5   |  23.0  |  1.96<H>    Ca    8.8      09 Mar 2018 06:39  Phos  3.3     03-09  Mg     1.6     03-09      MEDICATIONS   acetaminophen   Tablet 650 milliGRAM(s) every 6 hours PRN  ascorbic acid 250 milliGRAM(s) daily  carbidopa/levodopa  25/250 1 Tablet(s) three times a day  cefTRIAXone   IVPB 1 Gram(s) every 24 hours  finasteride 5 milliGRAM(s) daily  gabapentin 300 milliGRAM(s) daily  heparin  Injectable 5000 Unit(s) every 12 hours  lidocaine   Patch 1 Patch daily  lidocaine   Patch 1 Patch daily  multivitamin 1 Tablet(s) daily  pantoprazole    Tablet 40 milliGRAM(s) before breakfast  saccharomyces boulardii 250 milliGRAM(s) two times a day  sodium chloride 0.9%. 1000 milliLiter(s) <Continuous>  tamsulosin 0.8 milliGRAM(s) at bedtime  traMADol 25 milliGRAM(s) every 8 hours PRN      --------------------------------------------------------------------      86M with functional deficits s/p recent flu now admitted for acute renal failure with rhabdomyolysis in setting of UTI/sepsis/JETT  ~Medical management as per primary team  ~Pain management (Lidocaine patch, tylenol/tramadol PRN)  ~When medically stable, recommend discharge to [facility].

## 2018-03-09 NOTE — PROGRESS NOTE ADULT - ATTENDING COMMENTS
Chart reviewed.  TOV today.  No new issues overnight. Patient states shoulder pain improved, some left hip pain.  While in hospital will continue daily therapy.    When medically stable, [facility] program is appropriate to improve overall function  Thank you.
pt seen and examined at bedside  Doing better than Yesterday  agree with above
pt seen and examined at bedside, daughter present  c/o b/l shoulder pain - agreeable to try tramadol in addition to heat pack, tylenol and lidocaine patch   Per pt did not have trouble urinating prior, had frequent urination possibly 2/2 UTI /poluyric phase of ATN  ct tovar for now to monitor Urine output, will DC once renal function continues to improve
will sign off at this time,  please call back as needed

## 2018-03-10 LAB
ANION GAP SERPL CALC-SCNC: 12 MMOL/L — SIGNIFICANT CHANGE UP (ref 5–17)
APPEARANCE UR: CLEAR — SIGNIFICANT CHANGE UP
BACTERIA # UR AUTO: ABNORMAL
BILIRUB UR-MCNC: NEGATIVE — SIGNIFICANT CHANGE UP
BUN SERPL-MCNC: 39 MG/DL — HIGH (ref 8–20)
CALCIUM SERPL-MCNC: 8.6 MG/DL — SIGNIFICANT CHANGE UP (ref 8.6–10.2)
CHLORIDE SERPL-SCNC: 101 MMOL/L — SIGNIFICANT CHANGE UP (ref 98–107)
CHLORIDE UR-SCNC: 83 MMOL/L — SIGNIFICANT CHANGE UP
CO2 SERPL-SCNC: 26 MMOL/L — SIGNIFICANT CHANGE UP (ref 22–29)
COLOR SPEC: YELLOW — SIGNIFICANT CHANGE UP
CREAT SERPL-MCNC: 1.44 MG/DL — HIGH (ref 0.5–1.3)
CULTURE RESULTS: SIGNIFICANT CHANGE UP
CULTURE RESULTS: SIGNIFICANT CHANGE UP
DIFF PNL FLD: ABNORMAL
EPI CELLS # UR: SIGNIFICANT CHANGE UP
GLUCOSE SERPL-MCNC: 102 MG/DL — SIGNIFICANT CHANGE UP (ref 70–115)
GLUCOSE UR QL: NEGATIVE MG/DL — SIGNIFICANT CHANGE UP
HCT VFR BLD CALC: 33.3 % — LOW (ref 42–52)
HGB BLD-MCNC: 11.1 G/DL — LOW (ref 14–18)
KETONES UR-MCNC: NEGATIVE — SIGNIFICANT CHANGE UP
LEUKOCYTE ESTERASE UR-ACNC: NEGATIVE — SIGNIFICANT CHANGE UP
MAGNESIUM SERPL-MCNC: 1.4 MG/DL — LOW (ref 1.6–2.6)
MCHC RBC-ENTMCNC: 30.7 PG — SIGNIFICANT CHANGE UP (ref 27–31)
MCHC RBC-ENTMCNC: 33.3 G/DL — SIGNIFICANT CHANGE UP (ref 32–36)
MCV RBC AUTO: 92 FL — SIGNIFICANT CHANGE UP (ref 80–94)
NITRITE UR-MCNC: NEGATIVE — SIGNIFICANT CHANGE UP
OSMOLALITY UR: 583 MOSM/KG — SIGNIFICANT CHANGE UP (ref 300–1000)
PH UR: 5 — SIGNIFICANT CHANGE UP (ref 5–8)
PHOSPHATE SERPL-MCNC: 3 MG/DL — SIGNIFICANT CHANGE UP (ref 2.4–4.7)
PLATELET # BLD AUTO: 249 K/UL — SIGNIFICANT CHANGE UP (ref 150–400)
POTASSIUM SERPL-MCNC: 3.3 MMOL/L — LOW (ref 3.5–5.3)
POTASSIUM SERPL-SCNC: 3.3 MMOL/L — LOW (ref 3.5–5.3)
PROT UR-MCNC: NEGATIVE MG/DL — SIGNIFICANT CHANGE UP
RBC # BLD: 3.62 M/UL — LOW (ref 4.6–6.2)
RBC # FLD: 13.5 % — SIGNIFICANT CHANGE UP (ref 11–15.6)
RBC CASTS # UR COMP ASSIST: SIGNIFICANT CHANGE UP /HPF (ref 0–4)
SODIUM SERPL-SCNC: 139 MMOL/L — SIGNIFICANT CHANGE UP (ref 135–145)
SODIUM UR-SCNC: 66 MMOL/L — SIGNIFICANT CHANGE UP
SP GR SPEC: 1.01 — SIGNIFICANT CHANGE UP (ref 1.01–1.02)
SPECIMEN SOURCE: SIGNIFICANT CHANGE UP
SPECIMEN SOURCE: SIGNIFICANT CHANGE UP
UROBILINOGEN FLD QL: NEGATIVE MG/DL — SIGNIFICANT CHANGE UP
WBC # BLD: 13.8 K/UL — HIGH (ref 4.8–10.8)
WBC # FLD AUTO: 13.8 K/UL — HIGH (ref 4.8–10.8)
WBC UR QL: SIGNIFICANT CHANGE UP

## 2018-03-10 PROCEDURE — 99233 SBSQ HOSP IP/OBS HIGH 50: CPT

## 2018-03-10 RX ORDER — POTASSIUM CHLORIDE 20 MEQ
40 PACKET (EA) ORAL ONCE
Qty: 0 | Refills: 0 | Status: COMPLETED | OUTPATIENT
Start: 2018-03-10 | End: 2018-03-10

## 2018-03-10 RX ADMIN — Medication 250 MILLIGRAM(S): at 05:59

## 2018-03-10 RX ADMIN — FINASTERIDE 5 MILLIGRAM(S): 5 TABLET, FILM COATED ORAL at 12:46

## 2018-03-10 RX ADMIN — GABAPENTIN 300 MILLIGRAM(S): 400 CAPSULE ORAL at 12:47

## 2018-03-10 RX ADMIN — SODIUM CHLORIDE 75 MILLILITER(S): 9 INJECTION INTRAMUSCULAR; INTRAVENOUS; SUBCUTANEOUS at 05:59

## 2018-03-10 RX ADMIN — CARBIDOPA AND LEVODOPA 1 TABLET(S): 25; 100 TABLET ORAL at 23:27

## 2018-03-10 RX ADMIN — TRAMADOL HYDROCHLORIDE 25 MILLIGRAM(S): 50 TABLET ORAL at 23:31

## 2018-03-10 RX ADMIN — HEPARIN SODIUM 5000 UNIT(S): 5000 INJECTION INTRAVENOUS; SUBCUTANEOUS at 18:16

## 2018-03-10 RX ADMIN — SODIUM CHLORIDE 75 MILLILITER(S): 9 INJECTION INTRAMUSCULAR; INTRAVENOUS; SUBCUTANEOUS at 23:28

## 2018-03-10 RX ADMIN — CARBIDOPA AND LEVODOPA 1 TABLET(S): 25; 100 TABLET ORAL at 14:25

## 2018-03-10 RX ADMIN — CARBIDOPA AND LEVODOPA 1 TABLET(S): 25; 100 TABLET ORAL at 05:35

## 2018-03-10 RX ADMIN — CEFTRIAXONE 100 GRAM(S): 500 INJECTION, POWDER, FOR SOLUTION INTRAMUSCULAR; INTRAVENOUS at 14:24

## 2018-03-10 RX ADMIN — LIDOCAINE 1 PATCH: 4 CREAM TOPICAL at 14:23

## 2018-03-10 RX ADMIN — Medication 250 MILLIGRAM(S): at 12:47

## 2018-03-10 RX ADMIN — Medication 250 MILLIGRAM(S): at 18:15

## 2018-03-10 RX ADMIN — PANTOPRAZOLE SODIUM 40 MILLIGRAM(S): 20 TABLET, DELAYED RELEASE ORAL at 05:35

## 2018-03-10 RX ADMIN — TAMSULOSIN HYDROCHLORIDE 0.8 MILLIGRAM(S): 0.4 CAPSULE ORAL at 23:27

## 2018-03-10 RX ADMIN — Medication 1 TABLET(S): at 12:46

## 2018-03-10 RX ADMIN — HEPARIN SODIUM 5000 UNIT(S): 5000 INJECTION INTRAVENOUS; SUBCUTANEOUS at 05:35

## 2018-03-10 RX ADMIN — Medication 40 MILLIEQUIVALENT(S): at 14:28

## 2018-03-10 NOTE — PROGRESS NOTE ADULT - SUBJECTIVE AND OBJECTIVE BOX
JAH WARD Patient is a 86y old  Male who presents with a chief complaint of ABNORMAL LABS. (06 Mar 2018 02:53)     HPI:  85 y/o male was sent in from his rehab where he was noted to have abnormal labs, wbc was high. pt. was started on levaquin on 3/3/18 by NH.  Pt. was admitted at [facility] in 2018 and had influenza and was then sent to rehab. As per daughter pt. has urine frequency and pt. reports on and off dysuria. no cough. no fever. no abd. pain. no n/v/d. no cp. pt's po intake has not been good for past few days as his wife just passed away. no other complaints at this point. (06 Mar 2018 02:53)    The patient was seen and evaluated with physical therapist and family at bedside  The patient is in no acute distress.  Denied any fever chest pain, palpitations, shortness of breath, abdominal pain, fever, dysuria, cough, edema   Complains of " i dont want  rehab, dont want their food , dont want them"    I&O's Summary    09 Mar 2018 07:01  -  10 Mar 2018 07:00  --------------------------------------------------------  IN: 1650 mL / OUT: 725 mL / NET: 925 mL      Allergies    aspirin (Unknown)    Intolerances      HEALTH ISSUES - PROBLEM Dx:  Diarrhea of presumed infectious origin: Diarrhea of presumed infectious origin  Urinary tract infection without hematuria, site unspecified: Urinary tract infection without hematuria, site unspecified  Non-traumatic rhabdomyolysis: Non-traumatic rhabdomyolysis  Parkinson disease: Parkinson disease  Leukemoid reaction: Leukemoid reaction  Acute renal failure, unspecified acute renal failure type: Acute renal failure, unspecified acute renal failure type        PAST MEDICAL & SURGICAL HISTORY:  Noel esophagus  Spinal stenosis  Parkinson disease  Hypertrophy (Benign) of Prostate  High Cholesterol  History of Hypertension  Bilateral Cataracts  History of Appendectomy  Retina: surgery          Vital Signs Last 24 Hrs  T(C): 37.1 (10 Mar 2018 15:46), Max: 37.1 (10 Mar 2018 00:00)  T(F): 98.8 (10 Mar 2018 15:46), Max: 98.8 (10 Mar 2018 00:00)  HR: 95 (10 Mar 2018 15:46) (72 - 95)  BP: 143/73 (10 Mar 2018 15:46) (116/70 - 143/73)  BP(mean): --  RR: 19 (10 Mar 2018 15:46) (18 - 19)  SpO2: 93% (10 Mar 2018 15:46) (93% - 98%)T(C): 37.1 (03-10-18 @ 15:46), Max: 37.1 (03-10-18 @ 00:00)  HR: 95 (03-10-18 @ 15:46) (72 - 95)  BP: 143/73 (03-10-18 @ 15:46) (116/70 - 143/73)  RR: 19 (03-10-18 @ 15:46) (18 - 19)  SpO2: 93% (03-10-18 @ 15:46) (93% - 98%)  Wt(kg): --    PHYSICAL EXAM:    GENERAL: NAD, well-groomed, eldelry, ill appearing   HEAD:  Atraumatic, Normocephalic  EYES: EOMI,  conjunctiva and sclera clear  NERVOUS SYSTEM:  sleepy and tired after walking with PT but Oriented X2,  in chair can Moves upper and lower extremities; CNS-II-XII  CHEST/LUNG: Clear to auscultation bilaterally; No rales, rhonchi, wheezing,   HEART: Regular rate and rhythm; No murmurs,   ABDOMEN: Soft, Nontender, Nondistended; Bowel sounds present  EXTREMITIES:  Peripheral  edema  SKIN: No rashes or lesions  psychiatry- mood and affect approprite, Insight and judgement intact     acetaminophen   Tablet 650 milliGRAM(s) Oral every 6 hours PRN  ascorbic acid 250 milliGRAM(s) Oral daily  carbidopa/levodopa  25/250 1 Tablet(s) Oral three times a day  cefTRIAXone   IVPB 1 Gram(s) IV Intermittent every 24 hours  finasteride 5 milliGRAM(s) Oral daily  gabapentin 300 milliGRAM(s) Oral daily  heparin  Injectable 5000 Unit(s) SubCutaneous every 12 hours  lidocaine   Patch 1 Patch Transdermal daily  lidocaine   Patch 1 Patch Transdermal daily  multivitamin 1 Tablet(s) Oral daily  pantoprazole    Tablet 40 milliGRAM(s) Oral before breakfast  saccharomyces boulardii 250 milliGRAM(s) Oral two times a day  sodium chloride 0.9%. 1000 milliLiter(s) IV Continuous <Continuous>  tamsulosin 0.8 milliGRAM(s) Oral at bedtime  traMADol 25 milliGRAM(s) Oral every 8 hours PRN      LABS:                          11.1   13.8  )-----------( 249      ( 10 Mar 2018 07:08 )             33.3     03-10    139  |  101  |  39.0<H>  ----------------------------<  102  3.3<L>   |  26.0  |  1.44<H>    Ca    8.6      10 Mar 2018 07:08  Phos  3.0     03-10  Mg     1.4     03-10              Urinalysis Basic - ( 10 Mar 2018 04:43 )    Color: Yellow / Appearance: Clear / S.015 / pH: x  Gluc: x / Ketone: Negative  / Bili: Negative / Urobili: Negative mg/dL   Blood: x / Protein: Negative mg/dL / Nitrite: Negative   Leuk Esterase: Negative / RBC: 0-2 /HPF / WBC 3-5   Sq Epi: x / Non Sq Epi: Occasional / Bacteria: Occasional      CAPILLARY BLOOD GLUCOSE          RADIOLOGY & ADDITIONAL TESTS:      Consultant notes reviewed    Case discussed with consultant/provider/ family /patient

## 2018-03-10 NOTE — PROGRESS NOTE ADULT - SUBJECTIVE AND OBJECTIVE BOX
[facility] DIVISION OF KIDNEY DISEASES AND HYPERTENSION -- FOLLOW UP NOTE  --------------------------------------------------------------------------------  Chief Complaint:  JETT prerenal    24 hour events/subjective:  Pt seen and examined at bedside  No complaints  Renal fx improving markedly  Hypokalemic this AM      PAST HISTORY  --------------------------------------------------------------------------------  No significant changes to PMH, PSH, FHx, SHx, unless otherwise noted    ALLERGIES & MEDICATIONS  --------------------------------------------------------------------------------  Allergies    aspirin (Unknown)    Intolerances      Standing Inpatient Medications  ascorbic acid 250 milliGRAM(s) Oral daily  carbidopa/levodopa  25/250 1 Tablet(s) Oral three times a day  cefTRIAXone   IVPB 1 Gram(s) IV Intermittent every 24 hours  finasteride 5 milliGRAM(s) Oral daily  gabapentin 300 milliGRAM(s) Oral daily  heparin  Injectable 5000 Unit(s) SubCutaneous every 12 hours  lidocaine   Patch 1 Patch Transdermal daily  lidocaine   Patch 1 Patch Transdermal daily  multivitamin 1 Tablet(s) Oral daily  pantoprazole    Tablet 40 milliGRAM(s) Oral before breakfast  saccharomyces boulardii 250 milliGRAM(s) Oral two times a day  sodium chloride 0.9%. 1000 milliLiter(s) IV Continuous <Continuous>  tamsulosin 0.8 milliGRAM(s) Oral at bedtime    PRN Inpatient Medications  acetaminophen   Tablet 650 milliGRAM(s) Oral every 6 hours PRN  traMADol 25 milliGRAM(s) Oral every 8 hours PRN      REVIEW OF SYSTEMS  --------------------------------------------------------------------------------  Gen: No weight changes, fatigue, fevers/chills, weakness  Skin: No rashes  Head/Eyes/Ears/Mouth: No headache; Normal hearing; Normal vision w/o blurriness; No sinus pain/discomfort, sore throat  Respiratory: No dyspnea, cough, wheezing, hemoptysis  CV: No chest pain, PND, orthopnea  GI: No abdominal pain, diarrhea, constipation, nausea, vomiting, melena, hematochezia  : No increased frequency, dysuria, hematuria, nocturia  MSK: No joint pain/swelling; no back pain; no edema  Neuro: No dizziness/lightheadedness, weakness, seizures, numbness, tingling  Heme: No easy bruising or bleeding  Endo: No heat/cold intolerance  Psych: No significant nervousness, anxiety, stress, depression    All other systems were reviewed and are negative, except as noted.    VITALS/PHYSICAL EXAM  --------------------------------------------------------------------------------  T(C): 37.1 (03-10-18 @ 15:46), Max: 37.1 (03-10-18 @ 00:00)  HR: 95 (03-10-18 @ 15:46) (72 - 95)  BP: 143/73 (03-10-18 @ 15:46) (116/70 - 143/73)  RR: 19 (03-10-18 @ 15:46) (18 - 19)  SpO2: 93% (03-10-18 @ 15:46) (93% - 98%)  Wt(kg): --        03-09-18 @ 07:01  -  03-10-18 @ 07:00  --------------------------------------------------------  IN: 1650 mL / OUT: 725 mL / NET: 925 mL      Physical Exam:  	Gen: NAD, well-appearing  	HEENT: PERRL, supple neck, clear oropharynx  	Pulm: CTA B/L  	CV: RRR, S1S2; no rub  	Back: No spinal or CVA tenderness; no sacral edema  	Abd: +BS, distended  	: No suprapubic tenderness  	UE: Warm, FROM, no clubbing, intact strength; no edema; no asterixis  	LE: Warm, FROM, no clubbing, intact strength; +edema  	Neuro: No focal deficits, intact gait  	Psych: Normal affect and mood  	Skin: Warm, without rashes  	Vascular access:    LABS/STUDIES  --------------------------------------------------------------------------------              11.1   13.8  >-----------<  249      [03-10-18 @ 07:08]              33.3     139  |  101  |  39.0  ----------------------------<  102      [03-10-18 @ 07:08]  3.3   |  26.0  |  1.44        Ca     8.6     [03-10-18 @ 07:08]      Mg     1.4     [03-10-18 @ 07:08]      Phos  3.0     [03-10-18 @ 07:08]            Creatinine Trend:  SCr 1.44 [03-10 @ 07:08]  SCr 1.96 [03-09 @ 06:39]  SCr 3.16 [03-08 @ 06:45]  SCr 4.17 [03-07 @ 07:57]  SCr 4.77 [03-06 @ 12:05]    Urinalysis - [03-10-18 @ 04:43]      Color Yellow / Appearance Clear / SG 1.015 / pH 5.0      Gluc Negative / Ketone Negative  / Bili Negative / Urobili Negative       Blood Trace / Protein Negative / Leuk Est Negative / Nitrite Negative      RBC 0-2 / WBC 3-5 / Hyaline  / Gran  / Sq Epi  / Non Sq Epi Occasional / Bacteria Occasional    Urine Sodium 66      [03-10-18 @ 04:44]  Urine Chloride 83      [03-10-18 @ 04:44]  Urine Osmolality 583      [03-10-18 @ 04:43]    TSH 4.86      [03-05-18 @ 20:10]

## 2018-03-10 NOTE — PROGRESS NOTE ADULT - ASSESSMENT
86 year old male with past medical history of spinal stenosis; Parkinson's ; BPH, HTN; Noel's esophagus; noted to have UTI in rehab center; started on levaquin 3/3/18. Patient accompanied by his son who states patient  took advil for the past week as well. Pt noted to have very high white blood cell count; tovar d/c'd today TOV, bladder scan 301ml retained, straight cath.  Patient most recently had flu in Feb 2018; treated at [facility].     >UTI: GNR, E coli,  ID following, Continue with Rocephin      >JETT:   likely combination of poor po intake,  NSAIDs and diuretic use.  Continue  gentle hydration, renal function improving, Renal following, avoid nephrotoxic drugs. monitor Urine output, tovar d/c'd  IV fluids    > Elevated trop: likely renall    > Rhabdomyolysis: iv hydration, improved,     >Urinary Retention: Flomax to 0.8.  Tovar d/c'd, TOV, avoid constipation, PT for ambulation    >Parkinson's disease: levodopa/ carbidopa.    >Debility: Pain control, continue Tramadol, Lidoderm patches, Gabapentin resumed, renally dosed.  PM&R consult appreciated     dispo: PT recommend [facility]

## 2018-03-10 NOTE — PROGRESS NOTE ADULT - ASSESSMENT
1) Recovering JETT; prerenal/ATN  2) UTI  3) Hypokalemia  4) Anemia  5) UTI  6) Rhabdo    Patient likely prerenal from UTI; decreased po; further evidence is his hypokalemia and low phosphorus;  Renal function improving significantly  Renal sonogram reviewed  C/w IVF; encourage PO  WBC improving  Strict IO; tovar in place  Avoid nephrotoxic agents ie NSAIDs  Signing off; recall if needed

## 2018-03-11 PROCEDURE — 99233 SBSQ HOSP IP/OBS HIGH 50: CPT

## 2018-03-11 RX ORDER — MAGNESIUM OXIDE 400 MG ORAL TABLET 241.3 MG
400 TABLET ORAL
Qty: 0 | Refills: 0 | Status: COMPLETED | OUTPATIENT
Start: 2018-03-11 | End: 2018-03-12

## 2018-03-11 RX ADMIN — TRAMADOL HYDROCHLORIDE 25 MILLIGRAM(S): 50 TABLET ORAL at 21:36

## 2018-03-11 RX ADMIN — TAMSULOSIN HYDROCHLORIDE 0.8 MILLIGRAM(S): 0.4 CAPSULE ORAL at 21:37

## 2018-03-11 RX ADMIN — MAGNESIUM OXIDE 400 MG ORAL TABLET 400 MILLIGRAM(S): 241.3 TABLET ORAL at 18:28

## 2018-03-11 RX ADMIN — GABAPENTIN 300 MILLIGRAM(S): 400 CAPSULE ORAL at 13:07

## 2018-03-11 RX ADMIN — Medication 1 TABLET(S): at 13:06

## 2018-03-11 RX ADMIN — PANTOPRAZOLE SODIUM 40 MILLIGRAM(S): 20 TABLET, DELAYED RELEASE ORAL at 05:47

## 2018-03-11 RX ADMIN — FINASTERIDE 5 MILLIGRAM(S): 5 TABLET, FILM COATED ORAL at 13:06

## 2018-03-11 RX ADMIN — Medication 250 MILLIGRAM(S): at 18:28

## 2018-03-11 RX ADMIN — HEPARIN SODIUM 5000 UNIT(S): 5000 INJECTION INTRAVENOUS; SUBCUTANEOUS at 18:28

## 2018-03-11 RX ADMIN — LIDOCAINE 1 PATCH: 4 CREAM TOPICAL at 18:28

## 2018-03-11 RX ADMIN — HEPARIN SODIUM 5000 UNIT(S): 5000 INJECTION INTRAVENOUS; SUBCUTANEOUS at 05:48

## 2018-03-11 RX ADMIN — CARBIDOPA AND LEVODOPA 1 TABLET(S): 25; 100 TABLET ORAL at 05:47

## 2018-03-11 RX ADMIN — MAGNESIUM OXIDE 400 MG ORAL TABLET 400 MILLIGRAM(S): 241.3 TABLET ORAL at 13:07

## 2018-03-11 RX ADMIN — SODIUM CHLORIDE 75 MILLILITER(S): 9 INJECTION INTRAMUSCULAR; INTRAVENOUS; SUBCUTANEOUS at 05:47

## 2018-03-11 RX ADMIN — TRAMADOL HYDROCHLORIDE 25 MILLIGRAM(S): 50 TABLET ORAL at 22:30

## 2018-03-11 RX ADMIN — CARBIDOPA AND LEVODOPA 1 TABLET(S): 25; 100 TABLET ORAL at 13:06

## 2018-03-11 RX ADMIN — TRAMADOL HYDROCHLORIDE 25 MILLIGRAM(S): 50 TABLET ORAL at 00:36

## 2018-03-11 RX ADMIN — Medication 250 MILLIGRAM(S): at 05:47

## 2018-03-11 RX ADMIN — CARBIDOPA AND LEVODOPA 1 TABLET(S): 25; 100 TABLET ORAL at 21:36

## 2018-03-11 RX ADMIN — CEFTRIAXONE 100 GRAM(S): 500 INJECTION, POWDER, FOR SOLUTION INTRAMUSCULAR; INTRAVENOUS at 13:06

## 2018-03-11 RX ADMIN — LIDOCAINE 1 PATCH: 4 CREAM TOPICAL at 01:18

## 2018-03-11 RX ADMIN — Medication 250 MILLIGRAM(S): at 13:06

## 2018-03-11 NOTE — PROGRESS NOTE ADULT - ASSESSMENT
86 year old male with past medical history of spinal stenosis; Parkinson's ; BPH, HTN; Noel's esophagus; noted to have UTI in rehab center; started on levaquin 3/3/18. Patient accompanied by his son who states patient  took advil for the past week as well. Pt noted to have very high white blood cell count; tovar d/c'd today TOV, bladder scan 301ml retained, straight cath.  Patient most recently had flu in Feb 2018; treated at [facility].     >UTI: GNR, E coli,  ID following, Continue with Rocephin      >JETT:   likely combination of poor po intake,  NSAIDs and diuretic use.  Continue  gentle hydration, renal function improving, Renal following, avoid nephrotoxic drugs. monitor Urine output, tovar d/c'd elevated trop: likely renall    > Rhabdomyolysis: iv hydration, improved,     >Urinary Retention: Flomax to 0.8.  Tovar d/c'd, TOV, avoid constipation, PT for ambulation    >Parkinson's disease: levodopa/ carbidopa.    >Debility: Pain control, continue Tramadol, Lidoderm patches, Gabapentin resumed, renally dosed.  PM&R consult appreciated     dispo: PT recommend [facility]

## 2018-03-11 NOTE — PROGRESS NOTE ADULT - SUBJECTIVE AND OBJECTIVE BOX
JAH WARD Patient is a 86y old  Male who presents with a chief complaint of ABNORMAL LABS. (06 Mar 2018 02:53)     HPI:  85 y/o male was sent in from his rehab where he was noted to have abnormal labs, wbc was high. pt. was started on levaquin on 3/3/18 by NH.  Pt. was admitted at [facility] in 2018 and had influenza and was then sent to rehab. As per daughter pt. has urine frequency and pt. reports on and off dysuria. no cough. no fever. no abd. pain. no n/v/d. no cp. pt's po intake has not been good for past few days as his wife just passed away. no other complaints at this point. (06 Mar 2018 02:53)    The patient was seen and evaluated   The patient is in no acute distress.  Denied any fever chest pain, palpitations, shortness of breath, abdominal pain, fever, dysuria, cough, edema   Complains of "dont want to talk , please leave me alone, my house is being taken away "    I&O's Summary    11 Mar 2018 07:01  -  11 Mar 2018 17:06  --------------------------------------------------------  IN: 360 mL / OUT: 0 mL / NET: 360 mL      Allergies    aspirin (Unknown)    Intolerances      HEALTH ISSUES - PROBLEM Dx:  Diarrhea of presumed infectious origin: Diarrhea of presumed infectious origin  Urinary tract infection without hematuria, site unspecified: Urinary tract infection without hematuria, site unspecified  Non-traumatic rhabdomyolysis: Non-traumatic rhabdomyolysis  Parkinson disease: Parkinson disease  Leukemoid reaction: Leukemoid reaction  Acute renal failure, unspecified acute renal failure type: Acute renal failure, unspecified acute renal failure type        PAST MEDICAL & SURGICAL HISTORY:  Noel esophagus  Spinal stenosis  Parkinson disease  Hypertrophy (Benign) of Prostate  High Cholesterol  History of Hypertension  Bilateral Cataracts  History of Appendectomy  Retina: surgery          Vital Signs Last 24 Hrs  T(C): 37 (11 Mar 2018 16:48), Max: 37 (11 Mar 2018 00:28)  T(F): 98.6 (11 Mar 2018 16:48), Max: 98.6 (11 Mar 2018 00:28)  HR: 90 (11 Mar 2018 16:48) (78 - 90)  BP: 140/71 (11 Mar 2018 16:48) (130/62 - 160/79)  BP(mean): --  RR: 20 (11 Mar 2018 16:48) (18 - 20)  SpO2: 95% (11 Mar 2018 16:48) (93% - 99%)T(C): 37 (18 @ 16:48), Max: 37 (18 @ 00:28)  HR: 90 (18 @ 16:48) (78 - 90)  BP: 140/71 (18 @ 16:48) (130/62 - 160/79)  RR: 20 (18 @ 16:48) (18 - 20)  SpO2: 95% (18 @ 16:48) (93% - 99%)  Wt(kg): --    PHYSICAL EXAM:    GENERAL: NAD, elderly,  HEAD:  Atraumatic, Normocephalic  EYES: EOMI,  conjunctiva and sclera clear  ENMT:  Moist mucous membranes,  No lesions  NERVOUS SYSTEM:  Alert & Oriented X3,  Moves upper and lower extremities; CNS-II-XII  CHEST/LUNG: Clear to auscultation bilaterally; No rales, rhonchi, wheezing,   HEART: Regular rate and rhythm; No murmurs,   ABDOMEN: Soft, Nontender, Nondistended; Bowel sounds present  EXTREMITIES:  Peripheral edema  SKIN: No rashes or lesions  psychiatry- mood and affect approprite, Insight and judgement intact     acetaminophen   Tablet 650 milliGRAM(s) Oral every 6 hours PRN  ascorbic acid 250 milliGRAM(s) Oral daily  carbidopa/levodopa  25/250 1 Tablet(s) Oral three times a day  cefTRIAXone   IVPB 1 Gram(s) IV Intermittent every 24 hours  finasteride 5 milliGRAM(s) Oral daily  gabapentin 300 milliGRAM(s) Oral daily  heparin  Injectable 5000 Unit(s) SubCutaneous every 12 hours  lidocaine   Patch 1 Patch Transdermal daily  lidocaine   Patch 1 Patch Transdermal daily  magnesium oxide 400 milliGRAM(s) Oral three times a day with meals  multivitamin 1 Tablet(s) Oral daily  pantoprazole    Tablet 40 milliGRAM(s) Oral before breakfast  saccharomyces boulardii 250 milliGRAM(s) Oral two times a day  sodium chloride 0.9%. 1000 milliLiter(s) IV Continuous <Continuous>  tamsulosin 0.8 milliGRAM(s) Oral at bedtime  traMADol 25 milliGRAM(s) Oral every 8 hours PRN      LABS:                          11.1   13.8  )-----------( 249      ( 10 Mar 2018 07:08 )             33.3     03-10    139  |  101  |  39.0<H>  ----------------------------<  102  3.3<L>   |  26.0  |  1.44<H>    Ca    8.6      10 Mar 2018 07:08  Phos  3.0     03-10  Mg     1.4     03-10              Urinalysis Basic - ( 10 Mar 2018 04:43 )    Color: Yellow / Appearance: Clear / S.015 / pH: x  Gluc: x / Ketone: Negative  / Bili: Negative / Urobili: Negative mg/dL   Blood: x / Protein: Negative mg/dL / Nitrite: Negative   Leuk Esterase: Negative / RBC: 0-2 /HPF / WBC 3-5   Sq Epi: x / Non Sq Epi: Occasional / Bacteria: Occasional      CAPILLARY BLOOD GLUCOSE          RADIOLOGY & ADDITIONAL TESTS:      Consultant notes reviewed    Case discussed with consultant/provider/ family /patient

## 2018-03-12 ENCOUNTER — TRANSCRIPTION ENCOUNTER (OUTPATIENT)
Age: 83
End: 2018-03-12

## 2018-03-12 VITALS
HEART RATE: 92 BPM | OXYGEN SATURATION: 97 % | DIASTOLIC BLOOD PRESSURE: 75 MMHG | TEMPERATURE: 98 F | SYSTOLIC BLOOD PRESSURE: 149 MMHG | RESPIRATION RATE: 18 BRPM

## 2018-03-12 LAB
ANION GAP SERPL CALC-SCNC: 13 MMOL/L — SIGNIFICANT CHANGE UP (ref 5–17)
ANISOCYTOSIS BLD QL: SLIGHT — SIGNIFICANT CHANGE UP
BASOPHILS # BLD AUTO: 0 K/UL — SIGNIFICANT CHANGE UP (ref 0–0.2)
BASOPHILS NFR BLD AUTO: 1 % — SIGNIFICANT CHANGE UP (ref 0–2)
BUN SERPL-MCNC: 27 MG/DL — HIGH (ref 8–20)
CALCIUM SERPL-MCNC: 8.5 MG/DL — LOW (ref 8.6–10.2)
CHLORIDE SERPL-SCNC: 106 MMOL/L — SIGNIFICANT CHANGE UP (ref 98–107)
CO2 SERPL-SCNC: 25 MMOL/L — SIGNIFICANT CHANGE UP (ref 22–29)
CREAT SERPL-MCNC: 1.08 MG/DL — SIGNIFICANT CHANGE UP (ref 0.5–1.3)
EOSINOPHIL # BLD AUTO: 0.4 K/UL — SIGNIFICANT CHANGE UP (ref 0–0.5)
EOSINOPHIL NFR BLD AUTO: 3 % — SIGNIFICANT CHANGE UP (ref 0–6)
GLUCOSE SERPL-MCNC: 99 MG/DL — SIGNIFICANT CHANGE UP (ref 70–115)
HCT VFR BLD CALC: 31.4 % — LOW (ref 42–52)
HGB BLD-MCNC: 10.3 G/DL — LOW (ref 14–18)
LYMPHOCYTES # BLD AUTO: 1.4 K/UL — SIGNIFICANT CHANGE UP (ref 1–4.8)
LYMPHOCYTES # BLD AUTO: 22 % — SIGNIFICANT CHANGE UP (ref 20–55)
MACROCYTES BLD QL: SLIGHT — SIGNIFICANT CHANGE UP
MCHC RBC-ENTMCNC: 30.4 PG — SIGNIFICANT CHANGE UP (ref 27–31)
MCHC RBC-ENTMCNC: 32.8 G/DL — SIGNIFICANT CHANGE UP (ref 32–36)
MCV RBC AUTO: 92.6 FL — SIGNIFICANT CHANGE UP (ref 80–94)
MONOCYTES # BLD AUTO: 0.5 K/UL — SIGNIFICANT CHANGE UP (ref 0–0.8)
MONOCYTES NFR BLD AUTO: 6 % — SIGNIFICANT CHANGE UP (ref 3–10)
MYELOCYTES NFR BLD: 2 % — HIGH (ref 0–0)
NEUTROPHILS # BLD AUTO: 6 K/UL — SIGNIFICANT CHANGE UP (ref 1.8–8)
NEUTROPHILS NFR BLD AUTO: 64 % — SIGNIFICANT CHANGE UP (ref 37–73)
PLAT MORPH BLD: NORMAL — SIGNIFICANT CHANGE UP
PLATELET # BLD AUTO: 303 K/UL — SIGNIFICANT CHANGE UP (ref 150–400)
POIKILOCYTOSIS BLD QL AUTO: SLIGHT — SIGNIFICANT CHANGE UP
POTASSIUM SERPL-MCNC: 3.5 MMOL/L — SIGNIFICANT CHANGE UP (ref 3.5–5.3)
POTASSIUM SERPL-SCNC: 3.5 MMOL/L — SIGNIFICANT CHANGE UP (ref 3.5–5.3)
RBC # BLD: 3.39 M/UL — LOW (ref 4.6–6.2)
RBC # FLD: 13.7 % — SIGNIFICANT CHANGE UP (ref 11–15.6)
RBC BLD AUTO: ABNORMAL
SODIUM SERPL-SCNC: 144 MMOL/L — SIGNIFICANT CHANGE UP (ref 135–145)
VARIANT LYMPHS # BLD: 2 % — SIGNIFICANT CHANGE UP (ref 0–6)
WBC # BLD: 9.3 K/UL — SIGNIFICANT CHANGE UP (ref 4.8–10.8)
WBC # FLD AUTO: 9.3 K/UL — SIGNIFICANT CHANGE UP (ref 4.8–10.8)

## 2018-03-12 PROCEDURE — 87493 C DIFF AMPLIFIED PROBE: CPT

## 2018-03-12 PROCEDURE — 97163 PT EVAL HIGH COMPLEX 45 MIN: CPT

## 2018-03-12 PROCEDURE — 85610 PROTHROMBIN TIME: CPT

## 2018-03-12 PROCEDURE — 84300 ASSAY OF URINE SODIUM: CPT

## 2018-03-12 PROCEDURE — 81001 URINALYSIS AUTO W/SCOPE: CPT

## 2018-03-12 PROCEDURE — 84443 ASSAY THYROID STIM HORMONE: CPT

## 2018-03-12 PROCEDURE — 82550 ASSAY OF CK (CPK): CPT

## 2018-03-12 PROCEDURE — 84484 ASSAY OF TROPONIN QUANT: CPT

## 2018-03-12 PROCEDURE — 87086 URINE CULTURE/COLONY COUNT: CPT

## 2018-03-12 PROCEDURE — 71250 CT THORAX DX C-: CPT

## 2018-03-12 PROCEDURE — 85027 COMPLETE CBC AUTOMATED: CPT

## 2018-03-12 PROCEDURE — 87186 SC STD MICRODIL/AGAR DIL: CPT

## 2018-03-12 PROCEDURE — 93005 ELECTROCARDIOGRAM TRACING: CPT

## 2018-03-12 PROCEDURE — 84100 ASSAY OF PHOSPHORUS: CPT

## 2018-03-12 PROCEDURE — 87581 M.PNEUMON DNA AMP PROBE: CPT

## 2018-03-12 PROCEDURE — 71045 X-RAY EXAM CHEST 1 VIEW: CPT

## 2018-03-12 PROCEDURE — 74176 CT ABD & PELVIS W/O CONTRAST: CPT

## 2018-03-12 PROCEDURE — 97116 GAIT TRAINING THERAPY: CPT

## 2018-03-12 PROCEDURE — 83735 ASSAY OF MAGNESIUM: CPT

## 2018-03-12 PROCEDURE — 83935 ASSAY OF URINE OSMOLALITY: CPT

## 2018-03-12 PROCEDURE — 87040 BLOOD CULTURE FOR BACTERIA: CPT

## 2018-03-12 PROCEDURE — 87633 RESP VIRUS 12-25 TARGETS: CPT

## 2018-03-12 PROCEDURE — 99285 EMERGENCY DEPT VISIT HI MDM: CPT | Mod: 25

## 2018-03-12 PROCEDURE — 93306 TTE W/DOPPLER COMPLETE: CPT

## 2018-03-12 PROCEDURE — 82436 ASSAY OF URINE CHLORIDE: CPT

## 2018-03-12 PROCEDURE — 87798 DETECT AGENT NOS DNA AMP: CPT

## 2018-03-12 PROCEDURE — 82553 CREATINE MB FRACTION: CPT

## 2018-03-12 PROCEDURE — 84146 ASSAY OF PROLACTIN: CPT

## 2018-03-12 PROCEDURE — 96374 THER/PROPH/DIAG INJ IV PUSH: CPT | Mod: XU

## 2018-03-12 PROCEDURE — 84145 PROCALCITONIN (PCT): CPT

## 2018-03-12 PROCEDURE — 87046 STOOL CULTR AEROBIC BACT EA: CPT

## 2018-03-12 PROCEDURE — 80053 COMPREHEN METABOLIC PANEL: CPT

## 2018-03-12 PROCEDURE — 99239 HOSP IP/OBS DSCHRG MGMT >30: CPT

## 2018-03-12 PROCEDURE — 87045 FECES CULTURE AEROBIC BACT: CPT

## 2018-03-12 PROCEDURE — 97530 THERAPEUTIC ACTIVITIES: CPT

## 2018-03-12 PROCEDURE — 73030 X-RAY EXAM OF SHOULDER: CPT

## 2018-03-12 PROCEDURE — 80048 BASIC METABOLIC PNL TOTAL CA: CPT

## 2018-03-12 PROCEDURE — 87486 CHLMYD PNEUM DNA AMP PROBE: CPT

## 2018-03-12 PROCEDURE — 96375 TX/PRO/DX INJ NEW DRUG ADDON: CPT

## 2018-03-12 PROCEDURE — 83690 ASSAY OF LIPASE: CPT

## 2018-03-12 PROCEDURE — 83605 ASSAY OF LACTIC ACID: CPT

## 2018-03-12 PROCEDURE — 36415 COLL VENOUS BLD VENIPUNCTURE: CPT

## 2018-03-12 PROCEDURE — 76775 US EXAM ABDO BACK WALL LIM: CPT

## 2018-03-12 PROCEDURE — 97110 THERAPEUTIC EXERCISES: CPT

## 2018-03-12 RX ORDER — GABAPENTIN 400 MG/1
1 CAPSULE ORAL
Qty: 0 | Refills: 0 | COMMUNITY

## 2018-03-12 RX ORDER — TRAMADOL HYDROCHLORIDE 50 MG/1
0.5 TABLET ORAL
Qty: 0 | Refills: 0 | DISCHARGE
Start: 2018-03-12

## 2018-03-12 RX ORDER — LIDOCAINE 4 G/100G
2 CREAM TOPICAL
Qty: 0 | Refills: 0 | DISCHARGE
Start: 2018-03-12

## 2018-03-12 RX ORDER — GABAPENTIN 400 MG/1
1 CAPSULE ORAL
Qty: 0 | Refills: 0 | DISCHARGE
Start: 2018-03-12

## 2018-03-12 RX ORDER — ACETAMINOPHEN 500 MG
2 TABLET ORAL
Qty: 0 | Refills: 0 | DISCHARGE
Start: 2018-03-12

## 2018-03-12 RX ORDER — INDAPAMIDE 1.25 MG
1 TABLET ORAL
Qty: 0 | Refills: 0 | COMMUNITY

## 2018-03-12 RX ADMIN — Medication 250 MILLIGRAM(S): at 06:19

## 2018-03-12 RX ADMIN — CARBIDOPA AND LEVODOPA 1 TABLET(S): 25; 100 TABLET ORAL at 06:19

## 2018-03-12 RX ADMIN — CARBIDOPA AND LEVODOPA 1 TABLET(S): 25; 100 TABLET ORAL at 09:10

## 2018-03-12 RX ADMIN — LIDOCAINE 1 PATCH: 4 CREAM TOPICAL at 05:10

## 2018-03-12 RX ADMIN — GABAPENTIN 300 MILLIGRAM(S): 400 CAPSULE ORAL at 09:10

## 2018-03-12 RX ADMIN — MAGNESIUM OXIDE 400 MG ORAL TABLET 400 MILLIGRAM(S): 241.3 TABLET ORAL at 09:10

## 2018-03-12 RX ADMIN — CEFTRIAXONE 100 GRAM(S): 500 INJECTION, POWDER, FOR SOLUTION INTRAMUSCULAR; INTRAVENOUS at 11:04

## 2018-03-12 RX ADMIN — SODIUM CHLORIDE 75 MILLILITER(S): 9 INJECTION INTRAMUSCULAR; INTRAVENOUS; SUBCUTANEOUS at 00:41

## 2018-03-12 RX ADMIN — HEPARIN SODIUM 5000 UNIT(S): 5000 INJECTION INTRAVENOUS; SUBCUTANEOUS at 06:19

## 2018-03-12 RX ADMIN — Medication 250 MILLIGRAM(S): at 09:10

## 2018-03-12 RX ADMIN — Medication 1 TABLET(S): at 09:10

## 2018-03-12 RX ADMIN — FINASTERIDE 5 MILLIGRAM(S): 5 TABLET, FILM COATED ORAL at 09:10

## 2018-03-12 RX ADMIN — PANTOPRAZOLE SODIUM 40 MILLIGRAM(S): 20 TABLET, DELAYED RELEASE ORAL at 06:19

## 2018-03-12 NOTE — DISCHARGE NOTE ADULT - HOSPITAL COURSE
86 year old male with past medical history of spinal stenosis; Parkinson's ; BPH, HTN; Noel's esophagus; noted to have UTI in rehab center; started on levaquin 3/3/18. Patient accompanied by his son who states patient  took advil for the past week as well. Pt noted to have very high white blood cell count; tovar d/c'd today TOV, bladder scan 301ml retained, straight cath.  Patient most recently had flu in Feb 2018; treated at [facility]. Patient was treated for E.Coli UTI with IV Rocephin.  Patient had JETT which improved with IVF.  Patient stable for discharge to rehab today. 86 year old male with past medical history of spinal stenosis; Parkinson's ; BPH, HTN; Noel's esophagus; noted to have UTI in rehab center; started on levaquin 3/3/18. Patient accompanied by his son who states patient  took advil for the past week as well. Pt noted to have very high white blood cell count; tovar d/c'd today TOV, bladder scan 301ml retained, straight cath.  Patient most recently had flu in Feb 2018; treated at [facility]. Patient was treated for E.Coli UTI with IV Rocephin.  Patient had JETT which improved with IVF.  Patient stable for discharge to rehab today.    total time spent for discharge: 33 minutes

## 2018-03-12 NOTE — PROGRESS NOTE ADULT - SUBJECTIVE AND OBJECTIVE BOX
CC: ABNORMAL LABS.    HPI:  87 y/o male with PMH HTN, HLD, BPH, Parkinson's, spinal stensois, mohamud esophagus, was sent in from his rehab where he was noted to have abnormal labs, wbc was high. Patient was started on Levaquin on 3/3/18 by NH.  Pt. was admitted at [facility] in feb, 2018 with influenza and was then sent to rehab. As per daughter, patient had urine frequency and pt. reported on and off dysuria. Patient's po intake had not been good for past few days as his wife just passed away.     INTERVAL HPI/OVERNIGHT EVENTS:  Patient seen and examined sitting up in the chair with daughter at his side.  Patient denies any headache, dizziness, SOB, CP, abdominal pain, nausea, vomiting, dysuria.  Other ROS reviewed and are negative.      Vital Signs Last 24 Hrs  T(C): 36.3 (12 Mar 2018 08:15), Max: 37 (11 Mar 2018 16:48)  T(F): 97.3 (12 Mar 2018 08:15), Max: 98.6 (11 Mar 2018 16:48)  HR: 90 (12 Mar 2018 08:15) (88 - 90)  BP: 136/75 (12 Mar 2018 08:15) (136/75 - 146/79)  BP(mean): --  RR: 18 (12 Mar 2018 08:15) (18 - 20)  SpO2: 95% (12 Mar 2018 08:15) (95% - 98%)  I&O's Detail    11 Mar 2018 07:01  -  12 Mar 2018 07:00  --------------------------------------------------------  IN:    Oral Fluid: 360 mL    sodium chloride 0.9%.: 975 mL  Total IN: 1335 mL    OUT:    Voided: 425 mL  Total OUT: 425 mL    Total NET: 910 mL      PHYSICAL EXAM:  GENERAL: NAD, well-groomed, well-developed  HEAD:  Atraumatic, Normocephalic  NECK: Supple, No JVD, Normal thyroid  NERVOUS SYSTEM:  Alert & Oriented X3, Good concentration; Motor Strength 5/5 B/L upper and lower extremities  CHEST/LUNG: Clear to auscultation bilaterally; No rales, rhonchi, wheezing, or rubs  HEART: Regular rate and rhythm; No murmurs, rubs, or gallops  ABDOMEN: Soft, Nontender, Nondistended; Bowel sounds present  EXTREMITIES:  2+ Peripheral Pulses, (+) LE edema                                10.3   9.3   )-----------( 303      ( 12 Mar 2018 06:49 )             31.4     12 Mar 2018 06:49    144    |  106    |  27.0   ----------------------------<  99     3.5     |  25.0   |  1.08     Ca    8.5        12 Mar 2018 06:49    Culture - Stool (03.06.18 @ 11:32)    Specimen Source: .Stool Feces    Culture Results:   No enteric pathogens isolated.  (Stool culture examined for Salmonella,  Shigella, Campylobacter, Aeromonas, Plesiomonas,  Vibrio, E.coli O157 and Yersinia)    Culture - Urine (03.06.18 @ 00:13)    -  Amikacin: S <=16    -  Ampicillin: R >16    -  Ampicillin/Sulbactam: I 16/8    -  Aztreonam: S <=4    -  Cefazolin: S <=8    -  Cefepime: S <=4    -  Cefoxitin: S <=8    -  Ceftazidime: S <=1    -  Ceftriaxone: S <=1    -  Ciprofloxacin: S <=1    -  Ertapenem: S <=1    -  Gentamicin: S <=4    -  Imipenem: S <=1    -  Levofloxacin: S <=2    -  Meropenem: S <=1    -  Nitrofurantoin: S <=32    -  Piperacillin/Tazobactam: S <=16    -  Tobramycin: S <=4    -  Trimethoprim/Sulfamethoxazole: R >2/38    Specimen Source: .Urine Clean Catch (Midstream)    Culture Results:   10,000 - 49,000 CFU/mL Escherichia coli    Organism Identification: Escherichia coli    Organism: Escherichia coli    Method Type: SAVANNAH          MEDICATIONS  (STANDING):  ascorbic acid 250 milliGRAM(s) Oral daily  carbidopa/levodopa  25/250 1 Tablet(s) Oral three times a day  finasteride 5 milliGRAM(s) Oral daily  gabapentin 300 milliGRAM(s) Oral daily  heparin  Injectable 5000 Unit(s) SubCutaneous every 12 hours  lidocaine   Patch 1 Patch Transdermal daily  lidocaine   Patch 1 Patch Transdermal daily  multivitamin 1 Tablet(s) Oral daily  pantoprazole    Tablet 40 milliGRAM(s) Oral before breakfast  sodium chloride 0.9%. 1000 milliLiter(s) (75 mL/Hr) IV Continuous <Continuous>  tamsulosin 0.8 milliGRAM(s) Oral at bedtime    MEDICATIONS  (PRN):  acetaminophen   Tablet 650 milliGRAM(s) Oral every 6 hours PRN For Temp greater than or equal to 38 C (100.4 F)  traMADol 25 milliGRAM(s) Oral every 8 hours PRN Moderate Pain (4 - 6)

## 2018-03-12 NOTE — DISCHARGE NOTE ADULT - MEDICATION SUMMARY - MEDICATIONS TO TAKE
I will START or STAY ON the medications listed below when I get home from the hospital:    finasteride 5 mg oral tablet  -- 1 tab(s) by mouth once a day  -- Indication: For BPH    acetaminophen 325 mg oral tablet  -- 2 tab(s) by mouth every 6 hours, As needed, For Temp greater than or equal to 38 C (100.4 F)  -- Indication: For Fever    traMADol 50 mg oral tablet  -- 0.5 tab(s) by mouth every 8 hours, As needed, Moderate Pain (4 - 6)  -- Indication: For Pain    Flomax 0.4 mg oral capsule  -- 2 cap(s) by mouth once a day (at bedtime)  -- Indication: For BPH    gabapentin 300 mg oral capsule  -- 1 cap(s) by mouth once a day  -- Indication: For Home med    fenofibrate 43 mg oral capsule  -- 1 cap(s) by mouth once a day  -- Indication: For HLD    carbidopa-levodopa 25 mg-250 mg oral tablet  -- 1 tab(s) by mouth 3 times a day  -- Indication: For Parkinson disease    lidocaine 5% topical film  -- Apply on skin to affected area once a day to bilateral shoulders  -- Indication: For Pain    omeprazole 20 mg oral delayed release tablet  -- 2 tab(s) by mouth 2 times a day  -- Indication: For GERD    Calcium 600+D oral tablet  -- 1 tab(s) by mouth once a day  -- Indication: For Supplement    PreserVision AREDS 2 oral capsule  -- 1 cap(s) by mouth once a day  -- Indication: For Home med    Multiple Vitamins oral tablet  -- 1 tab(s) by mouth once a day  -- Indication: For Supplement    Vitamin B6 100 mg oral tablet  -- 1 tab(s) by mouth once a day  -- Indication: For Supplement    Vitamin C 250 mg oral tablet  -- 1 tab(s) by mouth once a day  -- Indication: For Supplement

## 2018-03-12 NOTE — PROGRESS NOTE ADULT - PROVIDER SPECIALTY LIST ADULT
Hospitalist
Infectious Disease
Infectious Disease
Nephrology
Rehab Medicine
Hospitalist
Hospitalist

## 2018-03-12 NOTE — DISCHARGE NOTE ADULT - PATIENT PORTAL LINK FT
You can access the [facility] Patient Portal, offered by [facility], by registering with the following website: [email or web address]

## 2018-03-12 NOTE — DIETITIAN INITIAL EVALUATION ADULT. - OTHER INFO
Pt seen at bedside. Pt uninterested in speaking but was able to answer a few questions. Pt with persistent lack of appetite as wife has recently  and states house is being taken away. pt noted with fair to good PO intake. Attempted education on current diet but pt seemed uninterested. Denies n/v/c/d.

## 2018-03-12 NOTE — DISCHARGE NOTE ADULT - SECONDARY DIAGNOSIS.
Acute renal failure, unspecified acute renal failure type Parkinson disease Urinary retention due to benign prostatic hyperplasia Non-traumatic rhabdomyolysis

## 2018-03-12 NOTE — DISCHARGE NOTE ADULT - MEDICATION SUMMARY - MEDICATIONS TO STOP TAKING
I will STOP taking the medications listed below when I get home from the hospital:    indapamide 2.5 mg oral tablet  -- 1 tab(s) by mouth once a day (in the morning)

## 2018-03-12 NOTE — DISCHARGE NOTE ADULT - CARE PLAN
Principal Discharge DX:	Urinary tract infection without hematuria, site unspecified  Goal:	Improved  Assessment and plan of treatment:	Complete antibiotics.  Follow up with primary doctor after rehab.  Secondary Diagnosis:	Acute renal failure, unspecified acute renal failure type  Assessment and plan of treatment:	Improved.  Follow up with primary doctor after rehab.  Secondary Diagnosis:	Parkinson disease  Assessment and plan of treatment:	Continue current medications as prescribed.  Follow up with primary doctor after rehab.  Secondary Diagnosis:	Urinary retention due to benign prostatic hyperplasia  Assessment and plan of treatment:	Continue current medications as prescribed.  Follow up with primary doctor after rehab.  Secondary Diagnosis:	Non-traumatic rhabdomyolysis  Assessment and plan of treatment:	Improved.  Follow up with primary doctor after rehab.

## 2018-03-12 NOTE — DISCHARGE NOTE ADULT - PLAN OF CARE
Improved Complete antibiotics.  Follow up with primary doctor after rehab. Improved.  Follow up with primary doctor after rehab. Continue current medications as prescribed.  Follow up with primary doctor after rehab.

## 2018-03-12 NOTE — DISCHARGE NOTE ADULT - CARE PROVIDER_API CALL
Ladinsky, Michael A (DO), Family Medicine; Geriatric Medicine  [address] [place] [address]  Phone: [phone]  Fax: [phone]

## 2018-03-12 NOTE — PROGRESS NOTE ADULT - ASSESSMENT
86 year old male with past medical history of spinal stenosis; Parkinson's ; BPH, HTN; Noel's esophagus; noted to have UTI in rehab center; started on levaquin 3/3/18. Patient accompanied by his son who states patient  took advil for the past week as well. Pt noted to have very high white blood cell count; tovar d/c'd today TOV, bladder scan 301ml retained, straight cath.  Patient most recently had flu in Feb 2018; treated at [facility].     >UTI: E coli,  ID following, Completed Rocephin    >JETT:   likely combination of poor po intake,  NSAIDs and diuretic use.  Improved with gentle hydration, Renal following, avoid nephrotoxic drugs. monitor Urine output, tovar d/c'd elevated trop: likely renall    > Rhabdomyolysis: improved with iv hydration     >Urinary Retention: Flomax to 0.8.  Tovar d/c'd, requiring straight catheterizations, avoid constipation, PT for ambulation    >Parkinson's disease: levodopa/ carbidopa.    >Debility: Pain control, continue Tramadol, Lidoderm patches, Gabapentin resumed, renally dosed.  PM&R consult appreciated     dispo: PT recommend [facility]

## 2018-03-12 NOTE — PROGRESS NOTE ADULT - NSHPATTENDINGPLANDISCUSS_GEN_ALL_CORE
PT and family at bedside
patient and daughter at bedside and pt stable for discharge
pt, daughter at bedside
PT and family at bedside
pt, daughter, NP, RN
family at bedside.
patient and his daughter at bedside.

## 2018-03-16 ENCOUNTER — APPOINTMENT (OUTPATIENT)
Dept: ORTHOPEDIC SURGERY | Facility: CLINIC | Age: 83
End: 2018-03-16

## 2018-05-01 ENCOUNTER — APPOINTMENT (OUTPATIENT)
Dept: NEUROLOGY | Facility: CLINIC | Age: 83
End: 2018-05-01
Payer: MEDICARE

## 2018-05-01 VITALS
DIASTOLIC BLOOD PRESSURE: 72 MMHG | SYSTOLIC BLOOD PRESSURE: 130 MMHG | BODY MASS INDEX: 30.99 KG/M2 | HEIGHT: 65 IN | WEIGHT: 186 LBS

## 2018-05-01 PROCEDURE — 99214 OFFICE O/P EST MOD 30 MIN: CPT

## 2018-05-01 RX ORDER — FINASTERIDE 5 MG/1
5 TABLET, FILM COATED ORAL
Refills: 0 | Status: ACTIVE | COMMUNITY

## 2018-05-01 RX ORDER — PRAMIPEXOLE DIHYDROCHLORIDE 0.25 MG/1
0.25 TABLET ORAL 3 TIMES DAILY
Qty: 90 | Refills: 3 | Status: ACTIVE | COMMUNITY
Start: 2018-05-01 | End: 1900-01-01

## 2018-05-01 RX ORDER — FENOFIBRIC ACID 45 MG/1
45 CAPSULE, DELAYED RELEASE ORAL
Refills: 0 | Status: ACTIVE | COMMUNITY

## 2018-05-01 RX ORDER — AMOXICILLIN 500 MG/1
500 TABLET, FILM COATED ORAL
Refills: 0 | Status: ACTIVE | COMMUNITY

## 2018-09-12 ENCOUNTER — RX RENEWAL (OUTPATIENT)
Age: 83
End: 2018-09-12

## 2018-11-15 ENCOUNTER — RX RENEWAL (OUTPATIENT)
Age: 83
End: 2018-11-15

## 2018-12-03 ENCOUNTER — APPOINTMENT (OUTPATIENT)
Dept: NEUROLOGY | Facility: CLINIC | Age: 83
End: 2018-12-03
Payer: MEDICARE

## 2018-12-03 VITALS
HEIGHT: 65 IN | BODY MASS INDEX: 30.99 KG/M2 | SYSTOLIC BLOOD PRESSURE: 108 MMHG | DIASTOLIC BLOOD PRESSURE: 68 MMHG | WEIGHT: 186 LBS

## 2018-12-03 DIAGNOSIS — M48.062 SPINAL STENOSIS, LUMBAR REGION WITH NEUROGENIC CLAUDICATION: ICD-10-CM

## 2018-12-03 PROCEDURE — 99214 OFFICE O/P EST MOD 30 MIN: CPT

## 2018-12-08 NOTE — OCCUPATIONAL THERAPY INITIAL EVALUATION ADULT - PHYSICAL ASSIST/NONPHYSICAL ASSIST: EATING, OT EVAL
Telephone Encounter by Mario Clements MD at 06/28/17 05:06 PM     Author:  Mario Clements MD Service:  (none) Author Type:  Physician     Filed:  06/28/17 05:06 PM Encounter Date:  6/28/2017 Status:  Signed     :  Mario Clements MD (Physician)            She can    Would just obtain a TSH in 6 weeks[VP1.1M]      Revision History        User Key Date/Time User Provider Type Action    > VP1.1 06/28/17 05:06 PM Mario Clements MD Physician Sign    M - Manual             set-up required

## 2019-01-06 NOTE — PATIENT PROFILE ADULT. - NS TRANSFER DENTURES
Per mother, patient fell last night going up concrete stairs and hit forehead.   No LOC, decreased appetite since  Patient c/o HA since  No vomiting.   Add c/o fever last night. Unknown temp but \"felt hot\"908.4  Given tylenol x1 hr. which patient reports helped his FIELDS     Lower/Full/Upper

## 2019-04-01 ENCOUNTER — RX RENEWAL (OUTPATIENT)
Age: 84
End: 2019-04-01

## 2019-06-10 ENCOUNTER — RX RENEWAL (OUTPATIENT)
Age: 84
End: 2019-06-10

## 2019-06-17 ENCOUNTER — APPOINTMENT (OUTPATIENT)
Dept: NEUROLOGY | Facility: CLINIC | Age: 84
End: 2019-06-17
Payer: MEDICARE

## 2019-06-17 VITALS
SYSTOLIC BLOOD PRESSURE: 109 MMHG | HEIGHT: 65 IN | DIASTOLIC BLOOD PRESSURE: 80 MMHG | BODY MASS INDEX: 31.32 KG/M2 | WEIGHT: 188 LBS

## 2019-06-17 DIAGNOSIS — G20 PARKINSON'S DISEASE: ICD-10-CM

## 2019-06-17 PROCEDURE — 99214 OFFICE O/P EST MOD 30 MIN: CPT

## 2019-06-17 RX ORDER — CARBIDOPA AND LEVODOPA 25; 250 MG/1; MG/1
25-250 TABLET ORAL 3 TIMES DAILY
Qty: 270 | Refills: 1 | Status: DISCONTINUED | COMMUNITY
Start: 2017-10-19 | End: 2019-06-17

## 2019-06-17 RX ORDER — CARBIDOPA AND LEVODOPA 50; 200 MG/1; MG/1
50-200 TABLET, EXTENDED RELEASE ORAL 3 TIMES DAILY
Qty: 90 | Refills: 5 | Status: DISCONTINUED | COMMUNITY
Start: 2019-04-01 | End: 2019-06-17

## 2019-08-07 ENCOUNTER — INPATIENT (INPATIENT)
Facility: HOSPITAL | Age: 84
LOS: 4 days | Discharge: HOME | DRG: 596 | End: 2019-08-12
Attending: INTERNAL MEDICINE | Admitting: INTERNAL MEDICINE
Payer: MEDICARE

## 2019-08-07 VITALS
HEIGHT: 65 IN | DIASTOLIC BLOOD PRESSURE: 75 MMHG | SYSTOLIC BLOOD PRESSURE: 138 MMHG | WEIGHT: 182.98 LBS | TEMPERATURE: 98 F | HEART RATE: 85 BPM | RESPIRATION RATE: 20 BRPM | OXYGEN SATURATION: 95 %

## 2019-08-07 LAB
ALBUMIN SERPL ELPH-MCNC: 3.3 G/DL — SIGNIFICANT CHANGE UP (ref 3.3–5.2)
ALP SERPL-CCNC: 44 U/L — SIGNIFICANT CHANGE UP (ref 40–120)
ALT FLD-CCNC: <5 U/L — SIGNIFICANT CHANGE UP
ANION GAP SERPL CALC-SCNC: 12 MMOL/L — SIGNIFICANT CHANGE UP (ref 5–17)
APPEARANCE UR: CLEAR — SIGNIFICANT CHANGE UP
AST SERPL-CCNC: 39 U/L — SIGNIFICANT CHANGE UP
BASOPHILS # BLD AUTO: 0.05 K/UL — SIGNIFICANT CHANGE UP (ref 0–0.2)
BASOPHILS NFR BLD AUTO: 0.7 % — SIGNIFICANT CHANGE UP (ref 0–2)
BILIRUB SERPL-MCNC: 0.4 MG/DL — SIGNIFICANT CHANGE UP (ref 0.4–2)
BILIRUB UR-MCNC: NEGATIVE — SIGNIFICANT CHANGE UP
BUN SERPL-MCNC: 69 MG/DL — HIGH (ref 8–20)
CALCIUM SERPL-MCNC: 9.1 MG/DL — SIGNIFICANT CHANGE UP (ref 8.6–10.2)
CHLORIDE SERPL-SCNC: 105 MMOL/L — SIGNIFICANT CHANGE UP (ref 98–107)
CK MB CFR SERPL CALC: 9.5 NG/ML — HIGH (ref 0–6.7)
CK SERPL-CCNC: 351 U/L — HIGH (ref 30–200)
CO2 SERPL-SCNC: 23 MMOL/L — SIGNIFICANT CHANGE UP (ref 22–29)
COLOR SPEC: YELLOW — SIGNIFICANT CHANGE UP
CREAT SERPL-MCNC: 2.63 MG/DL — HIGH (ref 0.5–1.3)
DIFF PNL FLD: NEGATIVE — SIGNIFICANT CHANGE UP
EOSINOPHIL # BLD AUTO: 0.5 K/UL — SIGNIFICANT CHANGE UP (ref 0–0.5)
EOSINOPHIL NFR BLD AUTO: 7 % — HIGH (ref 0–6)
EPI CELLS # UR: SIGNIFICANT CHANGE UP
GLUCOSE SERPL-MCNC: 113 MG/DL — SIGNIFICANT CHANGE UP (ref 70–115)
GLUCOSE UR QL: NEGATIVE MG/DL — SIGNIFICANT CHANGE UP
HCT VFR BLD CALC: 32.7 % — LOW (ref 39–50)
HGB BLD-MCNC: 10.5 G/DL — LOW (ref 13–17)
IMM GRANULOCYTES NFR BLD AUTO: 1.3 % — SIGNIFICANT CHANGE UP (ref 0–1.5)
KETONES UR-MCNC: ABNORMAL
LACTATE BLDV-MCNC: 1 MMOL/L — SIGNIFICANT CHANGE UP (ref 0.5–2)
LEUKOCYTE ESTERASE UR-ACNC: ABNORMAL
LYMPHOCYTES # BLD AUTO: 0.73 K/UL — LOW (ref 1–3.3)
LYMPHOCYTES # BLD AUTO: 10.2 % — LOW (ref 13–44)
MCHC RBC-ENTMCNC: 32 PG — SIGNIFICANT CHANGE UP (ref 27–34)
MCHC RBC-ENTMCNC: 32.1 GM/DL — SIGNIFICANT CHANGE UP (ref 32–36)
MCV RBC AUTO: 99.7 FL — SIGNIFICANT CHANGE UP (ref 80–100)
MONOCYTES # BLD AUTO: 0.77 K/UL — SIGNIFICANT CHANGE UP (ref 0–0.9)
MONOCYTES NFR BLD AUTO: 10.8 % — SIGNIFICANT CHANGE UP (ref 2–14)
NEUTROPHILS # BLD AUTO: 5.01 K/UL — SIGNIFICANT CHANGE UP (ref 1.8–7.4)
NEUTROPHILS NFR BLD AUTO: 70 % — SIGNIFICANT CHANGE UP (ref 43–77)
NITRITE UR-MCNC: NEGATIVE — SIGNIFICANT CHANGE UP
PH UR: 5 — SIGNIFICANT CHANGE UP (ref 5–8)
PLATELET # BLD AUTO: 373 K/UL — SIGNIFICANT CHANGE UP (ref 150–400)
POTASSIUM SERPL-MCNC: 4.9 MMOL/L — SIGNIFICANT CHANGE UP (ref 3.5–5.3)
POTASSIUM SERPL-SCNC: 4.9 MMOL/L — SIGNIFICANT CHANGE UP (ref 3.5–5.3)
PROT SERPL-MCNC: 6.2 G/DL — LOW (ref 6.6–8.7)
PROT UR-MCNC: 15 MG/DL
RBC # BLD: 3.28 M/UL — LOW (ref 4.2–5.8)
RBC # FLD: 13.4 % — SIGNIFICANT CHANGE UP (ref 10.3–14.5)
RBC CASTS # UR COMP ASSIST: SIGNIFICANT CHANGE UP /HPF (ref 0–4)
SODIUM SERPL-SCNC: 140 MMOL/L — SIGNIFICANT CHANGE UP (ref 135–145)
SP GR SPEC: 1.01 — SIGNIFICANT CHANGE UP (ref 1.01–1.02)
UROBILINOGEN FLD QL: NEGATIVE MG/DL — SIGNIFICANT CHANGE UP
WBC # BLD: 7.15 K/UL — SIGNIFICANT CHANGE UP (ref 3.8–10.5)
WBC # FLD AUTO: 7.15 K/UL — SIGNIFICANT CHANGE UP (ref 3.8–10.5)
WBC UR QL: SIGNIFICANT CHANGE UP

## 2019-08-07 PROCEDURE — 99218: CPT

## 2019-08-07 PROCEDURE — 71045 X-RAY EXAM CHEST 1 VIEW: CPT | Mod: 26

## 2019-08-07 RX ORDER — TAMSULOSIN HYDROCHLORIDE 0.4 MG/1
0.4 CAPSULE ORAL AT BEDTIME
Refills: 0 | Status: DISCONTINUED | OUTPATIENT
Start: 2019-08-07 | End: 2019-08-12

## 2019-08-07 RX ORDER — SODIUM CHLORIDE 9 MG/ML
1000 INJECTION INTRAMUSCULAR; INTRAVENOUS; SUBCUTANEOUS ONCE
Refills: 0 | Status: COMPLETED | OUTPATIENT
Start: 2019-08-07 | End: 2019-08-07

## 2019-08-07 RX ORDER — GABAPENTIN 400 MG/1
300 CAPSULE ORAL THREE TIMES A DAY
Refills: 0 | Status: DISCONTINUED | OUTPATIENT
Start: 2019-08-07 | End: 2019-08-12

## 2019-08-07 RX ORDER — PANTOPRAZOLE SODIUM 20 MG/1
40 TABLET, DELAYED RELEASE ORAL
Refills: 0 | Status: DISCONTINUED | OUTPATIENT
Start: 2019-08-07 | End: 2019-08-12

## 2019-08-07 RX ADMIN — GABAPENTIN 300 MILLIGRAM(S): 400 CAPSULE ORAL at 23:18

## 2019-08-07 RX ADMIN — PANTOPRAZOLE SODIUM 40 MILLIGRAM(S): 20 TABLET, DELAYED RELEASE ORAL at 23:19

## 2019-08-07 RX ADMIN — SODIUM CHLORIDE 1000 MILLILITER(S): 9 INJECTION INTRAMUSCULAR; INTRAVENOUS; SUBCUTANEOUS at 19:24

## 2019-08-07 RX ADMIN — Medication 125 MILLIGRAM(S): at 19:24

## 2019-08-07 RX ADMIN — TAMSULOSIN HYDROCHLORIDE 0.4 MILLIGRAM(S): 0.4 CAPSULE ORAL at 23:19

## 2019-08-07 NOTE — ED ADULT NURSE REASSESSMENT NOTE - NS ED NURSE REASSESS COMMENT FT1
Patients 20G right arm ultra sound guided IV became dislodged and was removed. Antibiotics will be delayed until IV can be established. Dr. Gregory made aware. will continue to observe.

## 2019-08-07 NOTE — ED ADULT TRIAGE NOTE - CHIEF COMPLAINT QUOTE
Pt BIBA due to family and wound care nurse being concerned regarding open wounds on entire body, has been seeing dermatologist regarding wounds, some blisters noted which were lanced yesterday and cream applied, pt offers no complaints at this time other than "some joint pain"

## 2019-08-07 NOTE — ED CDU PROVIDER INITIAL DAY NOTE - ATTENDING CONTRIBUTION TO CARE
I personally saw the patient with the PA, and completed the key components of the history and physical exam. I then discussed the management plan with the PA.    pt with cellulitis - will start IV abx, keeps in obs if no improvement/worsening consider admission

## 2019-08-07 NOTE — ED PROVIDER NOTE - OBJECTIVE STATEMENT
Pertinent PMH/PSH/FHx/SHx and Review of Systems contained within:  Patient presents to the ED for generalized weakness.  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  Patient has 24 hour care at home but is developing bullae on areas of pressure from trying to maneuvor in the house. These wounds are chronic.  VSS.  family bedside.  otheriwse baseline.  Non toxic.  Well appearing. No aggravating or relieving factors. No other pertinent PMH.  No other pertinent PSH.  No other pertinent FHx.  Patient denies EtOH/tobacco/illicit substance use. No fever/chills, No photophobia/eye pain/changes in vision, No ear pain/sore throat/dysphagia, No chest pain/palpitations, no SOB/cough/wheeze/stridor, No abdominal pain, No N/V/D, no dysuria/frequency/discharge, No neck/back pain no changes in neurological status/function.

## 2019-08-07 NOTE — ED PROVIDER NOTE - PHYSICAL EXAMINATION
Gen: Alert, NAD  Head: NC, AT, PERRL, EOMI, normal lids/conjunctiva  ENT: normal hearing, patent oropharynx without erythema/exudate, uvula midline  Neck: +supple, no tenderness/meningismus/JVD, +Trachea midline  Pulm: Bilateral BS, normal resp effort, no wheeze/stridor/retractions  CV: RRR, no M/R/G, +dist pulses  Abd: soft, NT/ND, +BS, no hepatosplenomegaly  Mskel: no edema/erythema/cyanosis  Skin: multiple large bullae in various stages and sizes on arms and legs with some unroofing, RLE with erythema and scaling  Neuro: AAOx3, no gross sensory/motor deficits

## 2019-08-07 NOTE — ED ADULT NURSE NOTE - OBJECTIVE STATEMENT
assumed pt care at 1825. Pt A&O x4. [facility] home care nurse saw the wounds on his legs and weeping edema to hands and arms and wanted him to come get an eval. Pt denies any chest pain, SOB, N/V/D, numbness/tingling, dizziness, headaches. No s/s of respiratory distress noted. Safety maintained. will continue to monitor.

## 2019-08-07 NOTE — ED PROVIDER NOTE - CLINICAL SUMMARY MEDICAL DECISION MAKING FREE TEXT BOX
Patient presents with fatigue.  concern for cellulitis and concern regarding mobility at home and performing assisted lifts as areas of stress on the skin form new bullous lesions.  Daughter bedside requesting SW to help her "figure out better home care."  lab values pending.  US pending.  Patient signed out to incoming physician.  All decisions regarding the progression of care will be made at their discretion.

## 2019-08-07 NOTE — ED ADULT NURSE REASSESSMENT NOTE - NS ED NURSE REASSESS COMMENT FT1
Assumed patient care at this time. patient denies pain or discomfort. denies sob or chest pain. Jean Carlos lower extremity swelling noted. Jean Carlos Lower extremity US ordered per MD. patient resting comfortably. family at bedside. Assumed patient care at this time. patient denies pain or discomfort. denies sob or chest pain. Jean Carlos lower extremity swelling noted. Jean Carlos Lower extremity US ordered per MD. As previously noted patient has multiple chronic blisters to upper and lower extremity extremities. patient resting comfortably. family at bedside. Assumed patient care at this time. Patient alert and oriented x4, patient denies pain or discomfort. denies sob or chest pain. Lung sounds clear through out. respirations even non labored. Jean Carlos lower extremity swelling noted. Jean Carlos Lower extremity US ordered per MD. As previously noted patient has multiple chronic blisters to upper and lower extremity extremities. patient resting comfortably. family at bedside. Assumed patient care at this time. Patient alert and oriented x4, patient denies pain or discomfort. denies sob or chest pain. Lung sounds clear through out. respirations even non labored. Jean Carlos lower extremity swelling noted. Jean Carlos Lower extremity US ordered per MD. As previously noted patient has multiple chronic open weeping blisters to upper and lower extremities. open blisters also noted to right shin, right foot, right testicle, right buttock with all sanguinous drainage. Patient being followed on all these blisters with out patient wound care and dermatology. patient resting comfortably. family at bedside.

## 2019-08-08 DIAGNOSIS — L03.90 CELLULITIS, UNSPECIFIED: ICD-10-CM

## 2019-08-08 PROCEDURE — 99497 ADVNCD CARE PLAN 30 MIN: CPT | Mod: 25

## 2019-08-08 PROCEDURE — 99223 1ST HOSP IP/OBS HIGH 75: CPT

## 2019-08-08 PROCEDURE — 99217: CPT

## 2019-08-08 RX ORDER — CEFAZOLIN SODIUM 1 G
VIAL (EA) INJECTION
Refills: 0 | Status: DISCONTINUED | OUTPATIENT
Start: 2019-08-08 | End: 2019-08-08

## 2019-08-08 RX ORDER — ACETAMINOPHEN 500 MG
650 TABLET ORAL EVERY 6 HOURS
Refills: 0 | Status: DISCONTINUED | OUTPATIENT
Start: 2019-08-08 | End: 2019-08-12

## 2019-08-08 RX ORDER — KETOROLAC TROMETHAMINE 30 MG/ML
30 SYRINGE (ML) INJECTION EVERY 6 HOURS
Refills: 0 | Status: DISCONTINUED | OUTPATIENT
Start: 2019-08-08 | End: 2019-08-08

## 2019-08-08 RX ORDER — CEFAZOLIN SODIUM 1 G
VIAL (EA) INJECTION
Refills: 0 | Status: DISCONTINUED | OUTPATIENT
Start: 2019-08-08 | End: 2019-08-09

## 2019-08-08 RX ORDER — FENOFIBRATE,MICRONIZED 130 MG
48 CAPSULE ORAL DAILY
Refills: 0 | Status: DISCONTINUED | OUTPATIENT
Start: 2019-08-08 | End: 2019-08-12

## 2019-08-08 RX ORDER — SACCHAROMYCES BOULARDII 250 MG
250 POWDER IN PACKET (EA) ORAL
Refills: 0 | Status: DISCONTINUED | OUTPATIENT
Start: 2019-08-08 | End: 2019-08-12

## 2019-08-08 RX ORDER — HEPARIN SODIUM 5000 [USP'U]/ML
5000 INJECTION INTRAVENOUS; SUBCUTANEOUS EVERY 12 HOURS
Refills: 0 | Status: DISCONTINUED | OUTPATIENT
Start: 2019-08-08 | End: 2019-08-12

## 2019-08-08 RX ORDER — CEFAZOLIN SODIUM 1 G
1000 VIAL (EA) INJECTION ONCE
Refills: 0 | Status: COMPLETED | OUTPATIENT
Start: 2019-08-08 | End: 2019-08-08

## 2019-08-08 RX ORDER — ASCORBIC ACID 60 MG
500 TABLET,CHEWABLE ORAL DAILY
Refills: 0 | Status: DISCONTINUED | OUTPATIENT
Start: 2019-08-08 | End: 2019-08-12

## 2019-08-08 RX ORDER — OMEPRAZOLE 10 MG/1
2 CAPSULE, DELAYED RELEASE ORAL
Qty: 0 | Refills: 0 | DISCHARGE

## 2019-08-08 RX ORDER — OMEGA-3 ACID ETHYL ESTERS 1 G
2 CAPSULE ORAL
Refills: 0 | Status: DISCONTINUED | OUTPATIENT
Start: 2019-08-08 | End: 2019-08-12

## 2019-08-08 RX ORDER — CARBIDOPA AND LEVODOPA 25; 100 MG/1; MG/1
1 TABLET ORAL THREE TIMES A DAY
Refills: 0 | Status: DISCONTINUED | OUTPATIENT
Start: 2019-08-08 | End: 2019-08-12

## 2019-08-08 RX ORDER — CARBIDOPA AND LEVODOPA 25; 100 MG/1; MG/1
1 TABLET ORAL
Refills: 0 | Status: DISCONTINUED | OUTPATIENT
Start: 2019-08-08 | End: 2019-08-08

## 2019-08-08 RX ORDER — SODIUM CHLORIDE 9 MG/ML
1000 INJECTION INTRAMUSCULAR; INTRAVENOUS; SUBCUTANEOUS
Refills: 0 | Status: DISCONTINUED | OUTPATIENT
Start: 2019-08-08 | End: 2019-08-12

## 2019-08-08 RX ORDER — CEFAZOLIN SODIUM 1 G
1000 VIAL (EA) INJECTION EVERY 24 HOURS
Refills: 0 | Status: DISCONTINUED | OUTPATIENT
Start: 2019-08-09 | End: 2019-08-09

## 2019-08-08 RX ORDER — FINASTERIDE 5 MG/1
5 TABLET, FILM COATED ORAL DAILY
Refills: 0 | Status: DISCONTINUED | OUTPATIENT
Start: 2019-08-08 | End: 2019-08-12

## 2019-08-08 RX ORDER — KETOROLAC TROMETHAMINE 30 MG/ML
15 SYRINGE (ML) INJECTION EVERY 6 HOURS
Refills: 0 | Status: DISCONTINUED | OUTPATIENT
Start: 2019-08-08 | End: 2019-08-08

## 2019-08-08 RX ORDER — TRAMADOL HYDROCHLORIDE 50 MG/1
25 TABLET ORAL EVERY 8 HOURS
Refills: 0 | Status: DISCONTINUED | OUTPATIENT
Start: 2019-08-08 | End: 2019-08-12

## 2019-08-08 RX ORDER — PYRIDOXINE HCL (VITAMIN B6) 100 MG
100 TABLET ORAL DAILY
Refills: 0 | Status: DISCONTINUED | OUTPATIENT
Start: 2019-08-08 | End: 2019-08-12

## 2019-08-08 RX ORDER — TRAMADOL HYDROCHLORIDE 50 MG/1
50 TABLET ORAL EVERY 8 HOURS
Refills: 0 | Status: DISCONTINUED | OUTPATIENT
Start: 2019-08-08 | End: 2019-08-12

## 2019-08-08 RX ADMIN — Medication 1 TABLET(S): at 21:51

## 2019-08-08 RX ADMIN — GABAPENTIN 300 MILLIGRAM(S): 400 CAPSULE ORAL at 21:51

## 2019-08-08 RX ADMIN — PANTOPRAZOLE SODIUM 40 MILLIGRAM(S): 20 TABLET, DELAYED RELEASE ORAL at 17:45

## 2019-08-08 RX ADMIN — PANTOPRAZOLE SODIUM 40 MILLIGRAM(S): 20 TABLET, DELAYED RELEASE ORAL at 05:53

## 2019-08-08 RX ADMIN — Medication 650 MILLIGRAM(S): at 15:21

## 2019-08-08 RX ADMIN — Medication 100 MILLIGRAM(S): at 17:44

## 2019-08-08 RX ADMIN — Medication 100 MILLIGRAM(S): at 00:30

## 2019-08-08 RX ADMIN — Medication 250 MILLIGRAM(S): at 17:47

## 2019-08-08 RX ADMIN — Medication 650 MILLIGRAM(S): at 21:52

## 2019-08-08 RX ADMIN — TRAMADOL HYDROCHLORIDE 25 MILLIGRAM(S): 50 TABLET ORAL at 17:47

## 2019-08-08 RX ADMIN — GABAPENTIN 300 MILLIGRAM(S): 400 CAPSULE ORAL at 15:20

## 2019-08-08 RX ADMIN — Medication 500 MILLIGRAM(S): at 21:51

## 2019-08-08 RX ADMIN — Medication 2 GRAM(S): at 17:45

## 2019-08-08 RX ADMIN — TAMSULOSIN HYDROCHLORIDE 0.4 MILLIGRAM(S): 0.4 CAPSULE ORAL at 21:51

## 2019-08-08 RX ADMIN — FINASTERIDE 5 MILLIGRAM(S): 5 TABLET, FILM COATED ORAL at 17:46

## 2019-08-08 RX ADMIN — CARBIDOPA AND LEVODOPA 1 TABLET(S): 25; 100 TABLET ORAL at 21:51

## 2019-08-08 RX ADMIN — Medication 1 TABLET(S): at 17:47

## 2019-08-08 RX ADMIN — SODIUM CHLORIDE 75 MILLILITER(S): 9 INJECTION INTRAMUSCULAR; INTRAVENOUS; SUBCUTANEOUS at 17:45

## 2019-08-08 RX ADMIN — HEPARIN SODIUM 5000 UNIT(S): 5000 INJECTION INTRAVENOUS; SUBCUTANEOUS at 17:45

## 2019-08-08 RX ADMIN — GABAPENTIN 300 MILLIGRAM(S): 400 CAPSULE ORAL at 05:53

## 2019-08-08 RX ADMIN — Medication 100 MILLIGRAM(S): at 08:32

## 2019-08-08 RX ADMIN — Medication 48 MILLIGRAM(S): at 17:46

## 2019-08-08 NOTE — ED CDU PROVIDER DISPOSITION NOTE - CLINICAL COURSE
87 year old M with pmhx of Parkinson being worked up by outside dermatologist in [place] MD [place] for work up of pemphigous in RLE and UE, had bx, not confirmed yet, c/o rash to RLE and hand started off smaller getting larger, with redness, swelling and warmth, being followed by wound care.  Had US in UE and LE negative for DVT.  Will admit for worsening cellulitis.

## 2019-08-08 NOTE — H&P ADULT - NSHPPHYSICALEXAM_GEN_ALL_CORE
T(C): 37 (08-08-19 @ 15:44), Max: 37.1 (08-08-19 @ 02:10)  HR: 75 (08-08-19 @ 15:44) (75 - 91)  BP: 108/67 (08-08-19 @ 15:44) (95/54 - 138/75)  RR: 18 (08-08-19 @ 15:44) (17 - 20)  SpO2: 97% (08-08-19 @ 15:44) (93% - 98%)    GEN - appears age appropriate. well nourished. no distress.   HEENT - NCAT, EOMI, KRIS  RESP - CTA BL, no wheeze/stridor/rhonchi/crackles. not on supplemental O2.  CARDIO - NS1S2, RRR. No murmurs/rubs/gallops.  ABD - Soft/Non tender/Non distended. Normal BS x4 quadrants.   Ext - +1 BL MATTHEW  MSK - BL 4/5 strength on upper and lower extremities.   Neuro - cn 2-12 grossly intact. no visible seizure activity appreciated. no tremor. gait not observed.   Skin - scattered areas of difference sizes of well cirucmscribed round blister bases, none purulent, some w/ mild serous discharge, LUE distal forearm blister w/ mod amount of sanguinous fluid around. RLE erythema over the skin w/ small open wounds, appear w/ delayed hearling, warmth noted. same appearance over RUE distal over the hand w/ many areas of desquamating skin from prior blisters, w/ edema + erythema, not tense.    Psych- AAOx3. no suicidal/homicidal ideation. appropriate behaviour. attentive. normal affect.

## 2019-08-08 NOTE — ED CDU PROVIDER SUBSEQUENT DAY NOTE - HISTORY
Pt with no acute complaints, resting comfortably in observation, IV antibiotics, social work in the AM

## 2019-08-08 NOTE — H&P ADULT - NSICDXPASTMEDICALHX_GEN_ALL_CORE_FT
PAST MEDICAL HISTORY:  Noel esophagus     High Cholesterol     History of Hypertension     Hypertrophy (Benign) of Prostate     Parkinson disease     Spinal stenosis

## 2019-08-08 NOTE — ED CDU PROVIDER SUBSEQUENT DAY NOTE - ATTENDING CONTRIBUTION TO CARE
chronic skin problems; daughter reports injury to RLE  approx 1 mth ago, wound care treatment by home health care nurse. daughter reports intermittent redness and scaling of LE usually improved with creme application.  Leg is more red since tuesday.  Also with blisters on arms/ hands which "got really big" 2 weeks ago and opened up on own approx 10 days ago.  Has visited dermatology in past (Dr Llamas) and reports biopsy (results not known).  PE:  circumferential redness and warmth of RLE with selling and secondary scaling suggesting circumferential cellutlits, ulcerations and erosion of rue with nonpitting edema of right arm.  Suspect acute on chronic skin condition with superimposed cellulits; needs admit for IV abx

## 2019-08-08 NOTE — H&P ADULT - HISTORY OF PRESENT ILLNESS
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 patient currently under the care of derm for w/u of bullous pemphigoid, sp bx pending result, has been on steroids. pt started developing blisters 6mo ago, prior to that no hx of lesions. noted recently to have superimposed erythema over the RUE + RLE concerning for infection prompting presentation. denies fevers, chills, headache, lightheadedness, dizziness, cp, palpitations, sob, dyspnea, abd pain, n/v/d. admits to pain, chronic, and multiple blisters, both forming and healing in various stages throughout skin.

## 2019-08-08 NOTE — GOALS OF CARE CONVERSATION - PERSONAL ADVANCE DIRECTIVE - WHAT MATTERS MOST
getting patient back home w/ services, patient + daughter do NOT want rehab/hugh under any circumstances

## 2019-08-08 NOTE — ED CDU PROVIDER DISPOSITION NOTE - NS ED MD DISPO ISOLATION TYPES
None Cardiac telemetry monitoring, CE #2, stress test, observation and reassessment.  CDU plan modified to include echo as pt states she has been having intermittent palpitations for the past few days.

## 2019-08-08 NOTE — H&P ADULT - ASSESSMENT
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     #RUE + RLE Cellulitis. stable  -superimposed on areas of open skin sec to suspected bullous pemphigoid  -w/o sepsis, dvt of upper + lower ext ruled out  -ID eval appreciated, sp clindamycin, iv kefzol started 8/8, duration per ID, likely 10d  -blood cx x2 pending  -cbc w/ diff, esr, crp in am    #efrain on ckd vs. ckd 3/4.  -baseline cr 1.5 but from 2018, unclear if has progressed since then naturally  -will see if pmd has more recent labs  -gentle hydration, ua unremarkable  -serial bmp, avoid nephrotoxic meds    #parkinsons. stable  -PT/OT pending per family request    #htn. controlled    #IMPROVE VTE Individual Risk Assessment    RISK                                                                Points    [  ] Previous VTE                                                  3    [  ] Thrombophilia                                               2    [ x ] Lower limb paralysis                                      2        (unable to hold up >15 seconds)      [  ] Current Cancer                                              2         (within 6 months)    [x  ] Immobilization > 24 hrs                                1    [  ] ICU/CCU stay > 24 hours                              1    [x  ] Age > 60                                                      1    IMPROVE VTE Score __6_______ -> sqh    #dispo. pending PT/OT. likely dc in 2-3 days    #outpt fu. pmd, derm

## 2019-08-08 NOTE — GOALS OF CARE CONVERSATION - PERSONAL ADVANCE DIRECTIVE - CONVERSATION DETAILS
Goals of Care discussed at length with the patient. This conversation included current condition, prognosis, and options for therapy. Discussed desires by patient for further care and wishes were expressed - curative measures desired. Advanced directives discussed, pt is a FULL CODE. Family present at the bedside, daughter.

## 2019-08-08 NOTE — H&P ADULT - NSHPLABSRESULTS_GEN_ALL_CORE
08-07    140  |  105  |  69.0<H>  ----------------------------<  113  4.9   |  23.0  |  2.63<H>    Ca    9.1      07 Aug 2019 19:17    TPro  6.2<L>  /  Alb  3.3  /  TBili  0.4  /  DBili  x   /  AST  39  /  ALT  <5  /  AlkPhos  44  08-07                          10.5   7.15  )-----------( 373      ( 07 Aug 2019 19:17 )             32.7     LIVER FUNCTIONS - ( 07 Aug 2019 19:17 )  Alb: 3.3 g/dL / Pro: 6.2 g/dL / ALK PHOS: 44 U/L / ALT: <5 U/L / AST: 39 U/L / GGT: x

## 2019-08-08 NOTE — ED ADULT NURSE REASSESSMENT NOTE - NS ED NURSE REASSESS COMMENT FT1
Assumed pt care at this time. Pt is A+Ox4, in no apparent distress. Pt with blisters, open and closed throughout entire body. Pt states pain is 7/10. Incontience care completed at this time and pt repositioned for comfort. Daughter at bedside. Aware of plan of care, VSS. IV #20 L hand patent. Safety measures in place. Will continue to closely monitor.

## 2019-08-09 LAB
-  AMPICILLIN: SIGNIFICANT CHANGE UP
-  CIPROFLOXACIN: SIGNIFICANT CHANGE UP
-  DAPTOMYCIN: SIGNIFICANT CHANGE UP
-  LEVOFLOXACIN: SIGNIFICANT CHANGE UP
-  LINEZOLID: SIGNIFICANT CHANGE UP
-  NITROFURANTOIN: SIGNIFICANT CHANGE UP
-  TETRACYCLINE: SIGNIFICANT CHANGE UP
-  VANCOMYCIN: SIGNIFICANT CHANGE UP
ANION GAP SERPL CALC-SCNC: 9 MMOL/L — SIGNIFICANT CHANGE UP (ref 5–17)
ANISOCYTOSIS BLD QL: SLIGHT — SIGNIFICANT CHANGE UP
BASOPHILS # BLD AUTO: 0 K/UL — SIGNIFICANT CHANGE UP (ref 0–0.2)
BASOPHILS NFR BLD AUTO: 0 % — SIGNIFICANT CHANGE UP (ref 0–2)
BUN SERPL-MCNC: 57 MG/DL — HIGH (ref 8–20)
CALCIUM SERPL-MCNC: 9.1 MG/DL — SIGNIFICANT CHANGE UP (ref 8.6–10.2)
CHLORIDE SERPL-SCNC: 105 MMOL/L — SIGNIFICANT CHANGE UP (ref 98–107)
CO2 SERPL-SCNC: 23 MMOL/L — SIGNIFICANT CHANGE UP (ref 22–29)
CREAT SERPL-MCNC: 2.17 MG/DL — HIGH (ref 0.5–1.3)
CRP SERPL-MCNC: 2.45 MG/DL — HIGH (ref 0–0.4)
CULTURE RESULTS: SIGNIFICANT CHANGE UP
EOSINOPHIL # BLD AUTO: 0.93 K/UL — HIGH (ref 0–0.5)
EOSINOPHIL NFR BLD AUTO: 13.9 % — HIGH (ref 0–6)
ERYTHROCYTE [SEDIMENTATION RATE] IN BLOOD: 50 MM/HR — HIGH (ref 0–20)
GIANT PLATELETS BLD QL SMEAR: PRESENT — SIGNIFICANT CHANGE UP
GLUCOSE SERPL-MCNC: 108 MG/DL — SIGNIFICANT CHANGE UP (ref 70–115)
HCT VFR BLD CALC: 33.2 % — LOW (ref 39–50)
HGB BLD-MCNC: 10.5 G/DL — LOW (ref 13–17)
LYMPHOCYTES # BLD AUTO: 0.82 K/UL — LOW (ref 1–3.3)
LYMPHOCYTES # BLD AUTO: 12.2 % — LOW (ref 13–44)
MACROCYTES BLD QL: SLIGHT — SIGNIFICANT CHANGE UP
MAGNESIUM SERPL-MCNC: 2.1 MG/DL — SIGNIFICANT CHANGE UP (ref 1.6–2.6)
MANUAL SMEAR VERIFICATION: SIGNIFICANT CHANGE UP
MCHC RBC-ENTMCNC: 31.6 GM/DL — LOW (ref 32–36)
MCHC RBC-ENTMCNC: 31.7 PG — SIGNIFICANT CHANGE UP (ref 27–34)
MCV RBC AUTO: 100.3 FL — HIGH (ref 80–100)
METAMYELOCYTES # FLD: 1.7 % — HIGH (ref 0–0)
METHOD TYPE: SIGNIFICANT CHANGE UP
MICROCYTES BLD QL: SLIGHT — SIGNIFICANT CHANGE UP
MONOCYTES # BLD AUTO: 0.7 K/UL — SIGNIFICANT CHANGE UP (ref 0–0.9)
MONOCYTES NFR BLD AUTO: 10.4 % — SIGNIFICANT CHANGE UP (ref 2–14)
MYELOCYTES NFR BLD: 1.7 % — HIGH (ref 0–0)
NEUTROPHILS # BLD AUTO: 3.97 K/UL — SIGNIFICANT CHANGE UP (ref 1.8–7.4)
NEUTROPHILS NFR BLD AUTO: 52.2 % — SIGNIFICANT CHANGE UP (ref 43–77)
NEUTS BAND # BLD: 7 % — SIGNIFICANT CHANGE UP (ref 0–8)
ORGANISM # SPEC MICROSCOPIC CNT: SIGNIFICANT CHANGE UP
ORGANISM # SPEC MICROSCOPIC CNT: SIGNIFICANT CHANGE UP
PHOSPHATE SERPL-MCNC: 2.5 MG/DL — SIGNIFICANT CHANGE UP (ref 2.4–4.7)
PLAT MORPH BLD: NORMAL — SIGNIFICANT CHANGE UP
PLATELET # BLD AUTO: 334 K/UL — SIGNIFICANT CHANGE UP (ref 150–400)
POTASSIUM SERPL-MCNC: 4.7 MMOL/L — SIGNIFICANT CHANGE UP (ref 3.5–5.3)
POTASSIUM SERPL-SCNC: 4.7 MMOL/L — SIGNIFICANT CHANGE UP (ref 3.5–5.3)
RBC # BLD: 3.31 M/UL — LOW (ref 4.2–5.8)
RBC # FLD: 13.4 % — SIGNIFICANT CHANGE UP (ref 10.3–14.5)
RBC BLD AUTO: NORMAL — SIGNIFICANT CHANGE UP
SODIUM SERPL-SCNC: 137 MMOL/L — SIGNIFICANT CHANGE UP (ref 135–145)
SPECIMEN SOURCE: SIGNIFICANT CHANGE UP
VARIANT LYMPHS # BLD: 0.9 % — SIGNIFICANT CHANGE UP (ref 0–6)
WBC # BLD: 6.7 K/UL — SIGNIFICANT CHANGE UP (ref 3.8–10.5)
WBC # FLD AUTO: 6.7 K/UL — SIGNIFICANT CHANGE UP (ref 3.8–10.5)

## 2019-08-09 PROCEDURE — 99233 SBSQ HOSP IP/OBS HIGH 50: CPT

## 2019-08-09 PROCEDURE — 99223 1ST HOSP IP/OBS HIGH 75: CPT

## 2019-08-09 RX ADMIN — CARBIDOPA AND LEVODOPA 1 TABLET(S): 25; 100 TABLET ORAL at 05:53

## 2019-08-09 RX ADMIN — HEPARIN SODIUM 5000 UNIT(S): 5000 INJECTION INTRAVENOUS; SUBCUTANEOUS at 17:01

## 2019-08-09 RX ADMIN — Medication 650 MILLIGRAM(S): at 01:09

## 2019-08-09 RX ADMIN — GABAPENTIN 300 MILLIGRAM(S): 400 CAPSULE ORAL at 05:53

## 2019-08-09 RX ADMIN — TAMSULOSIN HYDROCHLORIDE 0.4 MILLIGRAM(S): 0.4 CAPSULE ORAL at 21:22

## 2019-08-09 RX ADMIN — Medication 250 MILLIGRAM(S): at 05:53

## 2019-08-09 RX ADMIN — HEPARIN SODIUM 5000 UNIT(S): 5000 INJECTION INTRAVENOUS; SUBCUTANEOUS at 05:53

## 2019-08-09 RX ADMIN — Medication 650 MILLIGRAM(S): at 12:51

## 2019-08-09 RX ADMIN — Medication 650 MILLIGRAM(S): at 05:58

## 2019-08-09 RX ADMIN — Medication 650 MILLIGRAM(S): at 12:21

## 2019-08-09 RX ADMIN — GABAPENTIN 300 MILLIGRAM(S): 400 CAPSULE ORAL at 21:22

## 2019-08-09 RX ADMIN — Medication 100 MILLIGRAM(S): at 11:19

## 2019-08-09 RX ADMIN — PANTOPRAZOLE SODIUM 40 MILLIGRAM(S): 20 TABLET, DELAYED RELEASE ORAL at 05:53

## 2019-08-09 RX ADMIN — Medication 650 MILLIGRAM(S): at 18:48

## 2019-08-09 RX ADMIN — CARBIDOPA AND LEVODOPA 1 TABLET(S): 25; 100 TABLET ORAL at 14:05

## 2019-08-09 RX ADMIN — CARBIDOPA AND LEVODOPA 1 TABLET(S): 25; 100 TABLET ORAL at 21:22

## 2019-08-09 RX ADMIN — Medication 2 GRAM(S): at 17:00

## 2019-08-09 RX ADMIN — Medication 650 MILLIGRAM(S): at 21:25

## 2019-08-09 RX ADMIN — Medication 1 TABLET(S): at 11:19

## 2019-08-09 RX ADMIN — PANTOPRAZOLE SODIUM 40 MILLIGRAM(S): 20 TABLET, DELAYED RELEASE ORAL at 17:00

## 2019-08-09 RX ADMIN — GABAPENTIN 300 MILLIGRAM(S): 400 CAPSULE ORAL at 14:05

## 2019-08-09 RX ADMIN — Medication 250 MILLIGRAM(S): at 17:00

## 2019-08-09 RX ADMIN — Medication 48 MILLIGRAM(S): at 11:19

## 2019-08-09 RX ADMIN — Medication 500 MILLIGRAM(S): at 11:19

## 2019-08-09 RX ADMIN — FINASTERIDE 5 MILLIGRAM(S): 5 TABLET, FILM COATED ORAL at 11:19

## 2019-08-09 RX ADMIN — Medication 60 MILLIGRAM(S): at 21:22

## 2019-08-09 RX ADMIN — Medication 2 GRAM(S): at 05:53

## 2019-08-09 RX ADMIN — Medication 100 MILLIGRAM(S): at 17:00

## 2019-08-09 NOTE — PHYSICAL THERAPY INITIAL EVALUATION ADULT - ASR EQUIP NEEDS DISCH PT EVAL
bedside commode/raised toilet seat/gait belt/shower chair/wheelchair/patient lift/rolling walker (5 inch wheels)

## 2019-08-09 NOTE — PHYSICAL THERAPY INITIAL EVALUATION ADULT - ADDITIONAL COMMENTS
Pt lives in a private home with his son. Also has a live in aide 5 days a week and a daughter and son in law that assist as needed. 6 steps to enter with handrails, 6 steps inside with handrails. Pt required Min A PTA with RW. Pt owns RW, shower chair, w/c, commode and najma lift.

## 2019-08-09 NOTE — PHYSICAL THERAPY INITIAL EVALUATION ADULT - IMPAIRED TRANSFERS: SIT/STAND, REHAB EVAL
pain/impaired postural control/impaired coordination/decreased ROM/decreased strength/impaired balance

## 2019-08-09 NOTE — PHYSICAL THERAPY INITIAL EVALUATION ADULT - CRITERIA FOR SKILLED THERAPEUTIC INTERVENTIONS
anticipated equipment needs at discharge/MORIS recommended, pt undecided at this time, see POC recommendations/impairments found/anticipated discharge recommendation

## 2019-08-09 NOTE — PHYSICAL THERAPY INITIAL EVALUATION ADULT - PERTINENT HX OF CURRENT PROBLEM, REHAB EVAL
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

## 2019-08-09 NOTE — PHYSICAL THERAPY INITIAL EVALUATION ADULT - BALANCE DISTURBANCE, IDENTIFIED IMPAIRMENT CONTRIBUTE, REHAB EVAL
decreased strength/impaired coordination/impaired postural control/impaired motor control/pain/decreased ROM

## 2019-08-09 NOTE — CONSULT NOTE ADULT - ASSESSMENT
87M with PMHx as above with pain and hematuria on termination of urination 87M with PMHx as above with pain and hematuria on termination of urination    - UA on 8/7 with trace leuk esterase otherwise negative, Culture from same date and time >100,000 VRE however daughter informs me that this sample was taken from a urinal at the bedside and not taken in sterile fashion.  Pt denies being straight cathed at any time. 87M with PMHx as above with pain and hematuria on termination of urination    - UA on 8/7 with trace leuk esterase otherwise negative, Culture from same date and time >100,000 VRE however daughter informs me that this sample was taken from the urinal hanging  at the patient's bedside and not taken in sterile fashion.  Pt denies being straight cathed at any time.   - d/w Dr. Godinez, recommends no urological intervention at this time, defer to ID recommendations regarding + Urine Culture from the ED

## 2019-08-09 NOTE — PHYSICAL THERAPY INITIAL EVALUATION ADULT - IMPAIRED TRANSFERS: BED/CHAIR, REHAB EVAL
narrow base of support/impaired postural control/impaired coordination/decreased ROM/impaired balance/decreased strength

## 2019-08-09 NOTE — PHYSICAL THERAPY INITIAL EVALUATION ADULT - TRANSFER SAFETY CONCERNS NOTED: BED/CHAIR, REHAB EVAL
decreased balance during turns/decreased step length/decreased weight-shifting ability/decreased sequencing ability/stand pivot

## 2019-08-09 NOTE — CONSULT NOTE ADULT - ASSESSMENT
Bullous pemphigoid, likely.  Differential diagnosis would include other subepidermal bullous dermatoses such as epidermolysis bullosa acquisita and linear IgA bullous dermatosis.  It is not likely to be drug induced because the medications that he has been taking have not been reported to cause pemphigoid or linear IgA bullous dermatosis.    I discussed case with Dr Dunn.  I totally agree with the wound care:  cleansing with saline then applying petrolatum gauze held in place with Kerlix wrap changed once daily.    I do not see any signs of true cellulitis so I suggest stopping the IV antibiotics and starting oral prednisone.  Suggest 60 mg daily for 5 days then 40 mg daily until I see him in office on 8/23/2019 at which time I will begin to slowly taper the prednisone according to his progress.  Advise having a true RN homecare nurse initiate the wound care upon discharge.  Wounds should be healed in about 2 weeks.  Suggest starting stomach protection such as omeprazole or famotidine plus vitamin D while on the prednisone.  You may want to do some Accuchecks while still in hospital on the high dose prednisone and have nursing do these at home for a few days even though there is no history of diabetes mellitus.    Compression therapy for leg such as Unna boot would be considered after the acute blistering has subsided.

## 2019-08-09 NOTE — CONSULT NOTE ADULT - SUBJECTIVE AND OBJECTIVE BOX
HPI:  87yom w/ pmh ckd 3/4, parkinsons,macular degeneration, bph, htn, barretts, hx uti/pyelonephritis, and suspected bullous pemphigoid currently being worked up presenting w/ RUE + RLE cellulitis. patient currently under the care of derm for w/u of bullous pemphigoid, sp bx pending result, has been on topical halobetasol (ultrapotent corticosteroid). He started developing blisters 6mo ago, prior to that no hx of lesions. noted recently to have superimposed erythema over the RUE + RLE concerning for infection prompting presentation. Denies fevers, chills, headache, lightheadedness, dizziness, cp, palpitations, sob, dyspnea, abd pain, n/v/d. admits to pain, chronic, and multiple blisters, both forming and healing in various stages throughout skin. (08 Aug 2019 15:34)    He has a long history of pruritus that preceded the blistering by several months.  He has also had some chronic RLE wounds being treated by a home care nurse but this recently became redder when the blisters worsened over the last week or so.    Dr Anika Dennison, Transfer dermatologist took 2 biopsies last Tuesday so the results should be ready early next week.      PAST MEDICAL & SURGICAL HISTORY:  Noel esophagus  Spinal stenosis  Parkinson disease  Hypertrophy (Benign) of Prostate  High Cholesterol  History of Hypertension  Bilateral Cataracts  History of Appendectomy  Retina: surgery      REVIEW OF SYSTEMS:    CONSTITUTIONAL: No fever, weight loss, or fatigue  EYES: No eye pain, visual disturbances, or discharge  ENMT:  No difficulty hearing, tinnitus, vertigo; No sinus or throat pain  NECK: No pain or stiffness  RESPIRATORY: No cough, wheezing, chills or hemoptysis; No shortness of breath  CARDIOVASCULAR: No chest pain, palpitations, dizziness, or leg swelling  GASTROINTESTINAL: No abdominal or epigastric pain. No nausea, vomiting, or hematemesis; No diarrhea or constipation. No melena or hematochezia.  GENITOURINARY: No dysuria, frequency, hematuria, or incontinence  NEUROLOGICAL: No headaches, memory loss, loss of strength, numbness, or tremors  SKIN: No itching, burning, rashes, or lesions   LYMPH NODES: No enlarged glands  ENDOCRINE: No heat or cold intolerance; No hair loss  MUSCULOSKELETAL: No joint pain or swelling; No muscle, back, or extremity pain  PSYCHIATRIC: No depression, anxiety, mood swings, or difficulty sleeping  HEME/LYMPH: No easy bruising, or bleeding gums        MEDICATIONS  (STANDING):  ascorbic acid 500 milliGRAM(s) Oral daily  calcium carbonate 1250 mG  + Vitamin D (OsCal 500 + D) 1 Tablet(s) Oral daily  carbidopa/levodopa  25/250 1 Tablet(s) Oral three times a day  ceFAZolin   IVPB      ceFAZolin   IVPB 1000 milliGRAM(s) IV Intermittent every 24 hours  fenofibrate Tablet 48 milliGRAM(s) Oral daily  finasteride 5 milliGRAM(s) Oral daily  gabapentin 300 milliGRAM(s) Oral three times a day  heparin  Injectable 5000 Unit(s) SubCutaneous every 12 hours  multivitamin 1 Tablet(s) Oral daily  omega-3-Acid Ethyl Esters 2 Gram(s) Oral two times a day  pantoprazole    Tablet 40 milliGRAM(s) Oral two times a day  pyridoxine 100 milliGRAM(s) Oral daily  saccharomyces boulardii 250 milliGRAM(s) Oral two times a day  sodium chloride 0.9%. 1000 milliLiter(s) (75 mL/Hr) IV Continuous <Continuous>  tamsulosin 0.4 milliGRAM(s) Oral at bedtime    MEDICATIONS  (PRN):  acetaminophen   Tablet .. 650 milliGRAM(s) Oral every 6 hours PRN Temp greater or equal to 38C (100.4F), Mild Pain (1 - 3), Moderate Pain (4 - 6), Severe Pain (7 - 10)  acetaminophen   Tablet .. 650 milliGRAM(s) Oral every 6 hours PRN Temp greater or equal to 38C (100.4F), Mild Pain (1 - 3)  traMADol 25 milliGRAM(s) Oral every 8 hours PRN Moderate Pain (4 - 6)  traMADol 50 milliGRAM(s) Oral every 8 hours PRN Severe Pain (7 - 10)      Allergies    aspirin (Other (Mild))            SOCIAL HISTORY: daughter very involved in his care.  No substance abuse.    FAMILY HISTORY:  No pertinent family history in first degree relatives      Vital Signs Last 24 Hrs  T(C): 37 (09 Aug 2019 08:45), Max: 37.4 (08 Aug 2019 22:02)  T(F): 98.6 (09 Aug 2019 08:45), Max: 99.3 (08 Aug 2019 22:02)  HR: 74 (09 Aug 2019 08:45) (74 - 88)  BP: 123/65 (09 Aug 2019 08:45) (110/69 - 147/76)  BP(mean): --  RR: 20 (09 Aug 2019 08:45) (18 - 22)  SpO2: 93% (08 Aug 2019 22:02) (93% - 98%)    PHYSICAL EXAM:    GENERAL: NAD, obese,elderly man seated in chair can stand with assistance.  HEAD:  Atraumatic, Normocephalic  EYES: EOMI, PERRLA, conjunctiva and sclera clear  ENMT: No tonsillar erythema, exudates, or enlargement; Moist mucous membranes, Good dentition, No lesions  EXTREMITIES:  significant pitting edema with cool erythema and coarse desquamation of RLE   SKIN: total body skin exam including anogenital area revealed numerous tense bullae of right palm and wrist and several erosions of bilateral upper back, axillae, upper arms (R>L) scrotum with tense bulla right plantar surface        LABS:                        10.5   6.70  )-----------( 334      ( 09 Aug 2019 07:55 )             33.2     08-09    137  |  105  |  57.0<H>  ----------------------------<  108  4.7   |  23.0  |  2.17<H>    Ca    9.1      09 Aug 2019 07:55  Phos  2.5     -  Mg     2.1     -    TPro  6.2<L>  /  Alb  3.3  /  TBili  0.4  /  DBili  x   /  AST  39  /  ALT  <5  /  AlkPhos  44  08-07      Urinalysis Basic - ( 07 Aug 2019 20:12 )    Color: Yellow / Appearance: Clear / S.015 / pH: x  Gluc: x / Ketone: Trace  / Bili: Negative / Urobili: Negative mg/dL   Blood: x / Protein: 15 mg/dL / Nitrite: Negative   Leuk Esterase: Trace / RBC: 0-2 /HPF / WBC 0-2   Sq Epi: x / Non Sq Epi: Occasional / Bacteria: x      Sedimentation Rate, Erythrocyte: 50 mm/hr ( @ 07:55)
Asked by Dr. Dunn to see pt for hematuria at the end of urination.  87M PMHx HTN, PD, Noel's Esophagus, Pyelonephritis, Spinal Stenosis suspected Bullous Pemphigoid who initially presented to the ED for RUE and RLE cellulitis.  Pt states that last 4-5 times at the end of urination he experiences some pain and ends with a few drops of blood.  No prior episodes similar to this, states occasional incontinence that he and his daughter state is related to his Parkinson's.  Has had BPH treated only with medication for 30 years, has not seen his Urologist in 1-2 years.           MEDICATIONS  (STANDING):  ascorbic acid 500 milliGRAM(s) Oral daily  calcium carbonate 1250 mG  + Vitamin D (OsCal 500 + D) 1 Tablet(s) Oral daily  carbidopa/levodopa  25/250 1 Tablet(s) Oral three times a day  ceFAZolin   IVPB      ceFAZolin   IVPB 1000 milliGRAM(s) IV Intermittent every 24 hours  fenofibrate Tablet 48 milliGRAM(s) Oral daily  finasteride 5 milliGRAM(s) Oral daily  gabapentin 300 milliGRAM(s) Oral three times a day  heparin  Injectable 5000 Unit(s) SubCutaneous every 12 hours  multivitamin 1 Tablet(s) Oral daily  omega-3-Acid Ethyl Esters 2 Gram(s) Oral two times a day  pantoprazole    Tablet 40 milliGRAM(s) Oral two times a day  pyridoxine 100 milliGRAM(s) Oral daily  saccharomyces boulardii 250 milliGRAM(s) Oral two times a day  sodium chloride 0.9%. 1000 milliLiter(s) (75 mL/Hr) IV Continuous <Continuous>  tamsulosin 0.4 milliGRAM(s) Oral at bedtime    MEDICATIONS  (PRN):  acetaminophen   Tablet .. 650 milliGRAM(s) Oral every 6 hours PRN Temp greater or equal to 38C (100.4F), Mild Pain (1 - 3), Moderate Pain (4 - 6), Severe Pain (7 - 10)  acetaminophen   Tablet .. 650 milliGRAM(s) Oral every 6 hours PRN Temp greater or equal to 38C (100.4F), Mild Pain (1 - 3)  traMADol 25 milliGRAM(s) Oral every 8 hours PRN Moderate Pain (4 - 6)  traMADol 50 milliGRAM(s) Oral every 8 hours PRN Severe Pain (7 - 10)      Vital Signs Last 24 Hrs  T(C): 37 (09 Aug 2019 08:45), Max: 37.4 (08 Aug 2019 22:02)  T(F): 98.6 (09 Aug 2019 08:45), Max: 99.3 (08 Aug 2019 22:02)  HR: 74 (09 Aug 2019 08:45) (74 - 88)  BP: 123/65 (09 Aug 2019 08:45) (110/69 - 147/76)  BP(mean): --  RR: 20 (09 Aug 2019 08:45) (18 - 22)  SpO2: 93% (08 Aug 2019 22:02) (93% - 98%)    PHYSICAL EXAM:    - witnessed pt. urinate into urinal with the assistance of RN, approx 100cc with "burning" at the start of urination but no further pain.  At the termination of urinating, 1 drop of blood expressed from the meatus    Constitutional: NAD, severely deconditioned, unable to stand without max assist    Respiratory: no conversational dyspnea, no accessory muscle use    Cardiovascular: RRR    Gastrointestinal: soft, nt/nd, no suprapubic tenderness    Genitourinary: no abnormaliites noted to head or shaft of penis, no blood from meatus prior to urination noted, + ulcerations to posterior of scrotum similar to those on rest of body    Skin: multiple blisters/ulcerations noted all over body, varying sizes and age, extremities wrapped in dressings          I&O's Detail    09 Aug 2019 07:01  -  09 Aug 2019 16:33  --------------------------------------------------------  IN:    sodium chloride 0.9%.: 600 mL    Solution: 50 mL  Total IN: 650 mL    OUT:    Voided: 150 mL  Total OUT: 150 mL    Total NET: 500 mL          LABS:                        10.5   6.70  )-----------( 334      ( 09 Aug 2019 07:55 )             33.2     08-09    137  |  105  |  57.0<H>  ----------------------------<  108  4.7   |  23.0  |  2.17<H>    Ca    9.1      09 Aug 2019 07:55  Phos  2.5     08-  Mg     2.1     08-    TPro  6.2<L>  /  Alb  3.3  /  TBili  0.4  /  DBili  x   /  AST  39  /  ALT  <5  /  AlkPhos  44  08-07      Urinalysis Basic - ( 07 Aug 2019 20:12 )    Color: Yellow / Appearance: Clear / S.015 / pH: x  Gluc: x / Ketone: Trace  / Bili: Negative / Urobili: Negative mg/dL   Blood: x / Protein: 15 mg/dL / Nitrite: Negative   Leuk Esterase: Trace / RBC: 0-2 /HPF / WBC 0-2   Sq Epi: x / Non Sq Epi: Occasional / Bacteria: x        RADIOLOGY & ADDITIONAL STUDIES:
INFECTIOUS DISEASES AND INTERNAL MEDICINE at [place]  =======================================================  Antwon Lino MD  Diplomates American Board of Internal Medicine and Infectious Diseases  Telephone [phone]  Fax            [phone]  =======================================================    JAH [id number]      HPI:· HPI   Patient presents to the ED for generalized weakness.  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  Patient has 24 hour care at home but is developing bullae on areas of pressure from trying to maneuvor in the house. These wounds are chronic.  VSS.  family bedside.  otheriwse baseline.  Non toxic.  Well appearing. No aggravating or relieving factors. No other pertinent PMH.  No other pertinent PSH.  No other pertinent FHx.  Patient denies EtOH/tobacco/illicit substance use. No fever/chills, No photophobia/eye pain/changes in vision, No ear pain/sore throat/dysphagia, No chest pain/palpitations, no SOB/cough/wheeze/stridor, No abdominal pain, No N/V/D, no dysuria/frequency/discharge, No neck/back pain no changes in neurological status/function.  AREAS OF LOWER EXTREMITY AND RIGHT UPPER EXTREMITY WITH EDEMA AND ERYTHEMA CONCERN FOR SUPERIMPOSED CELLULITIS   ASKED TO EVALUATE FROM ID STANDPOINT         PAST MEDICAL & SURGICAL HISTORY:  Noel esophagus  Spinal stenosis  Parkinson disease  Hypertrophy (Benign) of Prostate  High Cholesterol  History of Hypertension  Bilateral Cataracts  History of Appendectomy  Retina: surgery      ANTIBIOTICS  ceFAZolin   IVPB          Allergies    aspirin (Unknown)    Intolerances        SOCIAL HISTORY:     FAMILY HX   FAMILY HISTORY:  No pertinent family history in first degree relatives      Vital Signs Last 24 Hrs  T(C): 36.6 (08 Aug 2019 10:36), Max: 37.1 (08 Aug 2019 02:10)  T(F): 97.9 (08 Aug 2019 10:36), Max: 98.8 (08 Aug 2019 02:10)  HR: 76 (08 Aug 2019 10:36) (76 - 91)  BP: 95/54 (08 Aug 2019 10:36) (95/54 - 138/75)  BP(mean): --  RR: 18 (08 Aug 2019 10:36) (17 - 20)  SpO2: 98% (08 Aug 2019 10:36) (93% - 98%)  Drug Dosing Weight  Height (cm): 165.1 (07 Aug 2019 16:47)  Weight (kg): 83 (07 Aug 2019 16:47)  BMI (kg/m2): 30.4 (07 Aug 2019 16:47)  BSA (m2): 1.9 (07 Aug 2019 16:47)      REVIEW OF SYSTEMS:    CONSTITUTIONAL:  As per HPI.    HEENT:  Eyes:  No diplopia or blurred vision. ENT:  No earache, sore throat or runny nose.    CARDIOVASCULAR:  No pressure, squeezing, strangling, tightness, heaviness or aching about the chest, neck, axilla or epigastrium.    RESPIRATORY:  No cough, shortness of breath, PND or orthopnea.    GASTROINTESTINAL:  No nausea, vomiting or diarrhea.    GENITOURINARY:  No dysuria, frequency or urgency.    MUSCULOSKELETAL:  As per HPI.    SKIN:  No change in skin, hair or nails.    NEUROLOGIC:  No paresthesias, fasciculations, seizures or weakness.                  PHYSICAL EXAMINATION:    GENERAL: The patient is a well-developed, well-nourished _____in no apparent distress. ___ is alert and oriented x3.    VITAL SIGNS: T(C): 36.6 (19 @ 10:36), Max: 37.1 (19 @ 02:10)  HR: 76 (19 @ 10:36) (76 - 91)  BP: 95/54 (19 @ 10:36) (95/54 - 138/75)  RR: 18 (19 @ 10:36) (17 - 20)  SpO2: 98% (08-08-19 @ 10:36) (93% - 98%)  Wt(kg): --    HEENT: Head is normocephalic and atraumatic.  ANICTERIC  NECK: Supple. No carotid bruits.  No lymphadenopathy or thyromegaly.    LUNGS:COARSE BREATH SOUNDS    HEART: Regular rate and rhythm without murmur.    ABDOMEN: Soft, nontender, and nondistended.  Positive bowel sounds.  No hepatosplenomegaly was noted. NO REBOUND NO GUARDING    EXTREMITIES: NO EDEMA NO ERYTHEMA    NEUROLOGIC: NON FOCAL      SKIN: No ulceration or induration present. NO RASH        BLOOD CULTURES  Culture Results:   >100,000 CFU/ml Streptococcus species Identification and susceptibility  to follow. ( @ 20:12)       URINE CX          LABS:                        10.5   7.15  )-----------( 373      ( 07 Aug 2019 19:17 )             32.7         140  |  105  |  69.0<H>  ----------------------------<  113  4.9   |  23.0  |  2.63<H>    Ca    9.1      07 Aug 2019 19:17    TPro  6.2<L>  /  Alb  3.3  /  TBili  0.4  /  DBili  x   /  AST  39  /  ALT  <5  /  AlkPhos  44        Urinalysis Basic - ( 07 Aug 2019 20:12 )    Color: Yellow / Appearance: Clear / S.015 / pH: x  Gluc: x / Ketone: Trace  / Bili: Negative / Urobili: Negative mg/dL   Blood: x / Protein: 15 mg/dL / Nitrite: Negative   Leuk Esterase: Trace / RBC: 0-2 /HPF / WBC 0-2   Sq Epi: x / Non Sq Epi: Occasional / Bacteria: x        RADIOLOGY & ADDITIONAL STUDIES:      ASSESSMENT/PLAN      Patient presents to the ED for generalized weakness.  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  Patient has 24 hour care at home but is developing bullae on areas of pressure from trying to maneuvor in the house. These wounds are chronic.  VSS.  family bedside.  otheriwse baseline.  Non toxic.  Well appearing. No aggravating or relieving factors. No other pertinent PMH.  No other pertinent PSH.  No other pertinent FHx.  Patient denies EtOH/tobacco/illicit substance use. No fever/chills, No photophobia/eye pain/changes in vision, No ear pain/sore throat/dysphagia, No chest pain/palpitations, no SOB/cough/wheeze/stridor, No abdominal pain, No N/V/D, no dysuria/frequency/discharge, No neck/back pain no changes in neurological status/function.  AREAS OF LOWER EXTREMITY AND RIGHT UPPER EXTREMITY WITH EDEMA AND ERYTHEMA    I AM CONCERNED ABOUT SUPERIMPOSED CELLULITIS WILL RECOMMEND IV ABX KEFZOL   I CALLED HER DERM DR NANCY BRIGHT IN [place]  SHE WAS NOT IN THE OFFICE   THEY  WILL CALL ONCE RESULTS  ARE BACK   WILL FOLLOW UP SPOKE TO DAUGHTER AT BEDSIDE               PENELOPE MENDEZ MD

## 2019-08-09 NOTE — PHYSICAL THERAPY INITIAL EVALUATION ADULT - IMPAIRMENTS CONTRIBUTING TO GAIT DEVIATIONS, PT EVAL
impaired coordination/decreased ROM/impaired postural control/pain/decreased strength/impaired balance

## 2019-08-09 NOTE — PHYSICAL THERAPY INITIAL EVALUATION ADULT - ADL SKILLS, REHAB EVAL
Update. Unaware home oxygen already set up. Per Dr Mills Prophet. Can go home with oxygen. meds as present. needs device and assist

## 2019-08-10 LAB
ANION GAP SERPL CALC-SCNC: 9 MMOL/L — SIGNIFICANT CHANGE UP (ref 5–17)
BUN SERPL-MCNC: 47 MG/DL — HIGH (ref 8–20)
CALCIUM SERPL-MCNC: 8.8 MG/DL — SIGNIFICANT CHANGE UP (ref 8.6–10.2)
CHLORIDE SERPL-SCNC: 103 MMOL/L — SIGNIFICANT CHANGE UP (ref 98–107)
CO2 SERPL-SCNC: 24 MMOL/L — SIGNIFICANT CHANGE UP (ref 22–29)
CREAT SERPL-MCNC: 1.81 MG/DL — HIGH (ref 0.5–1.3)
GLUCOSE SERPL-MCNC: 147 MG/DL — HIGH (ref 70–115)
HCT VFR BLD CALC: 35.7 % — LOW (ref 39–50)
HGB BLD-MCNC: 11.3 G/DL — LOW (ref 13–17)
MCHC RBC-ENTMCNC: 31.4 PG — SIGNIFICANT CHANGE UP (ref 27–34)
MCHC RBC-ENTMCNC: 31.7 GM/DL — LOW (ref 32–36)
MCV RBC AUTO: 99.2 FL — SIGNIFICANT CHANGE UP (ref 80–100)
PLATELET # BLD AUTO: 356 K/UL — SIGNIFICANT CHANGE UP (ref 150–400)
POTASSIUM SERPL-MCNC: 5 MMOL/L — SIGNIFICANT CHANGE UP (ref 3.5–5.3)
POTASSIUM SERPL-SCNC: 5 MMOL/L — SIGNIFICANT CHANGE UP (ref 3.5–5.3)
RBC # BLD: 3.6 M/UL — LOW (ref 4.2–5.8)
RBC # FLD: 13.2 % — SIGNIFICANT CHANGE UP (ref 10.3–14.5)
SODIUM SERPL-SCNC: 136 MMOL/L — SIGNIFICANT CHANGE UP (ref 135–145)
WBC # BLD: 4.71 K/UL — SIGNIFICANT CHANGE UP (ref 3.8–10.5)
WBC # FLD AUTO: 4.71 K/UL — SIGNIFICANT CHANGE UP (ref 3.8–10.5)

## 2019-08-10 PROCEDURE — 99233 SBSQ HOSP IP/OBS HIGH 50: CPT

## 2019-08-10 RX ADMIN — Medication 48 MILLIGRAM(S): at 12:28

## 2019-08-10 RX ADMIN — HEPARIN SODIUM 5000 UNIT(S): 5000 INJECTION INTRAVENOUS; SUBCUTANEOUS at 17:08

## 2019-08-10 RX ADMIN — Medication 650 MILLIGRAM(S): at 20:28

## 2019-08-10 RX ADMIN — Medication 500 MILLIGRAM(S): at 12:28

## 2019-08-10 RX ADMIN — Medication 650 MILLIGRAM(S): at 05:12

## 2019-08-10 RX ADMIN — Medication 650 MILLIGRAM(S): at 18:47

## 2019-08-10 RX ADMIN — GABAPENTIN 300 MILLIGRAM(S): 400 CAPSULE ORAL at 22:52

## 2019-08-10 RX ADMIN — CARBIDOPA AND LEVODOPA 1 TABLET(S): 25; 100 TABLET ORAL at 05:22

## 2019-08-10 RX ADMIN — Medication 100 MILLIGRAM(S): at 12:28

## 2019-08-10 RX ADMIN — GABAPENTIN 300 MILLIGRAM(S): 400 CAPSULE ORAL at 05:23

## 2019-08-10 RX ADMIN — TAMSULOSIN HYDROCHLORIDE 0.4 MILLIGRAM(S): 0.4 CAPSULE ORAL at 22:52

## 2019-08-10 RX ADMIN — Medication 60 MILLIGRAM(S): at 05:22

## 2019-08-10 RX ADMIN — Medication 250 MILLIGRAM(S): at 17:08

## 2019-08-10 RX ADMIN — Medication 650 MILLIGRAM(S): at 05:24

## 2019-08-10 RX ADMIN — Medication 250 MILLIGRAM(S): at 05:22

## 2019-08-10 RX ADMIN — PANTOPRAZOLE SODIUM 40 MILLIGRAM(S): 20 TABLET, DELAYED RELEASE ORAL at 05:22

## 2019-08-10 RX ADMIN — PANTOPRAZOLE SODIUM 40 MILLIGRAM(S): 20 TABLET, DELAYED RELEASE ORAL at 17:07

## 2019-08-10 RX ADMIN — Medication 2 GRAM(S): at 17:06

## 2019-08-10 RX ADMIN — Medication 1 TABLET(S): at 12:28

## 2019-08-10 RX ADMIN — Medication 650 MILLIGRAM(S): at 12:27

## 2019-08-10 RX ADMIN — FINASTERIDE 5 MILLIGRAM(S): 5 TABLET, FILM COATED ORAL at 12:28

## 2019-08-10 RX ADMIN — HEPARIN SODIUM 5000 UNIT(S): 5000 INJECTION INTRAVENOUS; SUBCUTANEOUS at 05:23

## 2019-08-10 RX ADMIN — GABAPENTIN 300 MILLIGRAM(S): 400 CAPSULE ORAL at 14:36

## 2019-08-10 RX ADMIN — CARBIDOPA AND LEVODOPA 1 TABLET(S): 25; 100 TABLET ORAL at 14:36

## 2019-08-10 RX ADMIN — CARBIDOPA AND LEVODOPA 1 TABLET(S): 25; 100 TABLET ORAL at 22:52

## 2019-08-10 RX ADMIN — Medication 650 MILLIGRAM(S): at 22:48

## 2019-08-10 RX ADMIN — Medication 1 TABLET(S): at 05:22

## 2019-08-10 RX ADMIN — Medication 650 MILLIGRAM(S): at 13:21

## 2019-08-11 PROCEDURE — 99233 SBSQ HOSP IP/OBS HIGH 50: CPT

## 2019-08-11 PROCEDURE — 99232 SBSQ HOSP IP/OBS MODERATE 35: CPT

## 2019-08-11 RX ADMIN — Medication 60 MILLIGRAM(S): at 05:36

## 2019-08-11 RX ADMIN — GABAPENTIN 300 MILLIGRAM(S): 400 CAPSULE ORAL at 15:01

## 2019-08-11 RX ADMIN — Medication 2 GRAM(S): at 17:23

## 2019-08-11 RX ADMIN — CARBIDOPA AND LEVODOPA 1 TABLET(S): 25; 100 TABLET ORAL at 21:06

## 2019-08-11 RX ADMIN — Medication 650 MILLIGRAM(S): at 17:24

## 2019-08-11 RX ADMIN — PANTOPRAZOLE SODIUM 40 MILLIGRAM(S): 20 TABLET, DELAYED RELEASE ORAL at 05:36

## 2019-08-11 RX ADMIN — HEPARIN SODIUM 5000 UNIT(S): 5000 INJECTION INTRAVENOUS; SUBCUTANEOUS at 17:23

## 2019-08-11 RX ADMIN — Medication 650 MILLIGRAM(S): at 05:47

## 2019-08-11 RX ADMIN — Medication 500 MILLIGRAM(S): at 12:39

## 2019-08-11 RX ADMIN — Medication 650 MILLIGRAM(S): at 18:48

## 2019-08-11 RX ADMIN — PANTOPRAZOLE SODIUM 40 MILLIGRAM(S): 20 TABLET, DELAYED RELEASE ORAL at 17:23

## 2019-08-11 RX ADMIN — Medication 1 TABLET(S): at 17:23

## 2019-08-11 RX ADMIN — CARBIDOPA AND LEVODOPA 1 TABLET(S): 25; 100 TABLET ORAL at 05:36

## 2019-08-11 RX ADMIN — Medication 100 MILLIGRAM(S): at 12:39

## 2019-08-11 RX ADMIN — GABAPENTIN 300 MILLIGRAM(S): 400 CAPSULE ORAL at 05:37

## 2019-08-11 RX ADMIN — Medication 250 MILLIGRAM(S): at 05:36

## 2019-08-11 RX ADMIN — Medication 48 MILLIGRAM(S): at 12:39

## 2019-08-11 RX ADMIN — Medication 1 TABLET(S): at 05:39

## 2019-08-11 RX ADMIN — CARBIDOPA AND LEVODOPA 1 TABLET(S): 25; 100 TABLET ORAL at 15:01

## 2019-08-11 RX ADMIN — GABAPENTIN 300 MILLIGRAM(S): 400 CAPSULE ORAL at 21:06

## 2019-08-11 RX ADMIN — Medication 250 MILLIGRAM(S): at 17:23

## 2019-08-11 RX ADMIN — Medication 1 TABLET(S): at 12:39

## 2019-08-11 RX ADMIN — FINASTERIDE 5 MILLIGRAM(S): 5 TABLET, FILM COATED ORAL at 12:39

## 2019-08-11 RX ADMIN — HEPARIN SODIUM 5000 UNIT(S): 5000 INJECTION INTRAVENOUS; SUBCUTANEOUS at 05:37

## 2019-08-11 RX ADMIN — Medication 10 MILLIGRAM(S): at 21:14

## 2019-08-11 RX ADMIN — Medication 2 GRAM(S): at 05:37

## 2019-08-11 RX ADMIN — TAMSULOSIN HYDROCHLORIDE 0.4 MILLIGRAM(S): 0.4 CAPSULE ORAL at 21:06

## 2019-08-11 NOTE — PROGRESS NOTE ADULT - SUBJECTIVE AND OBJECTIVE BOX
CC: Blistering skin lesion or bullous eruption .    HPI:  87yom w/ pmh ckd 3/4, parkinsons,macular degeneration, bph, htn, barretts, hx uti/pyelonephritis, and suspected bullous pemphigoid currently being worked up presenting w/ RUE + RLE cellulitis. patient currently under the care of derm for w/u of bullous pemphigoid, sp bx pending result, has been on steroids. pt started developing blisters 6mo ago, prior to that no hx of lesions. noted recently to have superimposed erythema over the RUE + RLE concerning for infection prompting presentation. denies fevers, chills, headache, lightheadedness, dizziness, cp, palpitations, sob, dyspnea, abd pain, n/v/d. admits to pain, chronic, and multiple blisters, both forming and healing in various stages throughout skin. (08 Aug 2019 15:34)    REVIEW OF SYSTEMS:    Patient denied fever, chills, abdominal pain, nausea, vomiting, cough, shortness of breath, chest pain or palpitations    Vital Signs Last 24 Hrs  T(C): 36.6 (09 Aug 2019 17:15), Max: 37.4 (08 Aug 2019 22:02)  T(F): 97.9 (09 Aug 2019 17:15), Max: 99.3 (08 Aug 2019 22:02)  HR: 89 (09 Aug 2019 17:15) (74 - 89)  BP: 120/64 (09 Aug 2019 17:15) (110/69 - 123/65)  BP(mean): --  RR: 20 (09 Aug 2019 17:15) (19 - 20)  SpO2: 96% (09 Aug 2019 17:15) (93% - 96%)I&O's Summary    09 Aug 2019 07:01  -  09 Aug 2019 20:19  --------------------------------------------------------  IN: 650 mL / OUT: 200 mL / NET: 450 mL      PHYSICAL EXAM:  GENERAL: Obese  HEENT: PERRL, +EOMI, anicteric, no [place]  NECK: Supple, No JVD   CHEST/LUNG: CTA bilaterally; Normal effort  HEART: S1S2 Normal intensity, no murmurs, gallops or rubs noted  ABDOMEN: Soft, BS Normoactive, NT, ND, no HSM noted  EXTREMITIES:  2+ radial and DP pulses noted, no clubbing, cyanosis, or edema noted, Limited mobility   SKIN: Bullae with fluid filled vesicles on the upper ext, shoulder, back and one lesion on the posterior scrotum.   NEURO: A&O, no focal deficits noted, CN II-XII intact  PSYCH: normal mood and affect; insight/judgement appropriate  LABS:                        10.5   6.70  )-----------( 334      ( 09 Aug 2019 07:55 )             33.2     08-09    137  |  105  |  57.0<H>  ----------------------------<  108  4.7   |  23.0  |  2.17<H>    Ca    9.1      09 Aug 2019 07:55  Phos  2.5     08-09  Mg     2.1     08-09          RADIOLOGY & ADDITIONAL TESTS:    MEDICATIONS:  MEDICATIONS  (STANDING):  ascorbic acid 500 milliGRAM(s) Oral daily  calcium carbonate 1250 mG  + Vitamin D (OsCal 500 + D) 1 Tablet(s) Oral daily  carbidopa/levodopa  25/250 1 Tablet(s) Oral three times a day  ceFAZolin   IVPB      ceFAZolin   IVPB 1000 milliGRAM(s) IV Intermittent every 24 hours  fenofibrate Tablet 48 milliGRAM(s) Oral daily  finasteride 5 milliGRAM(s) Oral daily  gabapentin 300 milliGRAM(s) Oral three times a day  heparin  Injectable 5000 Unit(s) SubCutaneous every 12 hours  multivitamin 1 Tablet(s) Oral daily  omega-3-Acid Ethyl Esters 2 Gram(s) Oral two times a day  pantoprazole    Tablet 40 milliGRAM(s) Oral two times a day  pyridoxine 100 milliGRAM(s) Oral daily  saccharomyces boulardii 250 milliGRAM(s) Oral two times a day  sodium chloride 0.9%. 1000 milliLiter(s) (75 mL/Hr) IV Continuous <Continuous>  tamsulosin 0.4 milliGRAM(s) Oral at bedtime    MEDICATIONS  (PRN):  acetaminophen   Tablet .. 650 milliGRAM(s) Oral every 6 hours PRN Temp greater or equal to 38C (100.4F), Mild Pain (1 - 3), Moderate Pain (4 - 6), Severe Pain (7 - 10)  acetaminophen   Tablet .. 650 milliGRAM(s) Oral every 6 hours PRN Temp greater or equal to 38C (100.4F), Mild Pain (1 - 3)  traMADol 25 milliGRAM(s) Oral every 8 hours PRN Moderate Pain (4 - 6)  traMADol 50 milliGRAM(s) Oral every 8 hours PRN Severe Pain (7 - 10)
CC: Bullous skin eruption- phempigoid type  HPI:  87yom w/ pmh ckd 3/4, parkinsons,macular degeneration, bph, htn, barretts, hx uti/pyelonephritis, and suspected bullous pemphigoid currently being worked up presenting w/ RUE + RLE cellulitis. patient currently under the care of derm for w/u of bullous pemphigoid, sp bx pending result, has been on steroids. pt started developing blisters 6mo ago, prior to that no hx of lesions. noted recently to have superimposed erythema over the RUE + RLE concerning for infection prompting presentation. denies fevers, chills, headache, lightheadedness, dizziness, cp, palpitations, sob, dyspnea, abd pain, n/v/d. admits to pain, chronic, and multiple blisters, both forming and healing in various stages throughout skin. (08 Aug 2019 15:34)    REVIEW OF SYSTEMS:    Patient denied fever, chills, abdominal pain, nausea, vomiting, cough, shortness of breath, chest pain or palpitations    Vital Signs Last 24 Hrs  T(C): 36.8 (09 Aug 2019 21:16), Max: 36.8 (09 Aug 2019 21:16)  T(F): 98.2 (09 Aug 2019 21:16), Max: 98.2 (09 Aug 2019 21:16)  HR: 96 (09 Aug 2019 21:16) (89 - 96)  BP: 116/58 (09 Aug 2019 21:16) (116/58 - 120/64)  BP(mean): --  RR: 19 (09 Aug 2019 21:16) (19 - 20)  SpO2: 97% (09 Aug 2019 21:16) (96% - 97%)I&O's Summary    09 Aug 2019 07:01  -  10 Aug 2019 07:00  --------------------------------------------------------  IN: 650 mL / OUT: 200 mL / NET: 450 mL      PHYSICAL EXAM:  GENERAL: NAD, well-groomed  HEENT: PERRL, +EOMI, anicteric, no [place]  NECK: Supple, No JVD   CHEST/LUNG: CTA bilaterally; Normal effort  HEART: S1S2 Normal intensity, no murmurs, gallops or rubs noted  ABDOMEN: Soft, BS Normoactive, NT, ND, no HSM noted  EXTREMITIES:  2+ radial and DP pulses noted, no clubbing, cyanosis, or edema noted, Limited mobility   SKIN: ruptured vesicular or bullous eruptions   NEURO: A&Ox3, no focal deficits noted, CN II-XII intact  PSYCH: normal mood and affect; insight/judgement appropriate  LABS:                        11.3   4.71  )-----------( 356      ( 10 Aug 2019 06:27 )             35.7     08-10    136  |  103  |  47.0<H>  ----------------------------<  147<H>  5.0   |  24.0  |  1.81<H>    Ca    8.8      10 Aug 2019 06:27  Phos  2.5     08-09  Mg     2.1     08-09          RADIOLOGY & ADDITIONAL TESTS:    MEDICATIONS:  MEDICATIONS  (STANDING):  ascorbic acid 500 milliGRAM(s) Oral daily  calcium carbonate 1250 mG  + Vitamin D (OsCal 500 + D) 1 Tablet(s) Oral two times a day  carbidopa/levodopa  25/250 1 Tablet(s) Oral three times a day  fenofibrate Tablet 48 milliGRAM(s) Oral daily  finasteride 5 milliGRAM(s) Oral daily  gabapentin 300 milliGRAM(s) Oral three times a day  heparin  Injectable 5000 Unit(s) SubCutaneous every 12 hours  multivitamin 1 Tablet(s) Oral daily  omega-3-Acid Ethyl Esters 2 Gram(s) Oral two times a day  pantoprazole    Tablet 40 milliGRAM(s) Oral two times a day  predniSONE   Tablet 60 milliGRAM(s) Oral daily  pyridoxine 100 milliGRAM(s) Oral daily  saccharomyces boulardii 250 milliGRAM(s) Oral two times a day  sodium chloride 0.9%. 1000 milliLiter(s) (75 mL/Hr) IV Continuous <Continuous>  tamsulosin 0.4 milliGRAM(s) Oral at bedtime    MEDICATIONS  (PRN):  acetaminophen   Tablet .. 650 milliGRAM(s) Oral every 6 hours PRN Temp greater or equal to 38C (100.4F), Mild Pain (1 - 3), Moderate Pain (4 - 6), Severe Pain (7 - 10)  acetaminophen   Tablet .. 650 milliGRAM(s) Oral every 6 hours PRN Temp greater or equal to 38C (100.4F), Mild Pain (1 - 3)  traMADol 25 milliGRAM(s) Oral every 8 hours PRN Moderate Pain (4 - 6)  traMADol 50 milliGRAM(s) Oral every 8 hours PRN Severe Pain (7 - 10)
CC: Vesicular and bullous skin eruptions.  Pemphigoid type.   HPI:  87yom w/ pmh ckd 3/4, parkinsons,macular degeneration, bph, htn, barretts, hx uti/pyelonephritis, and suspected bullous pemphigoid currently being worked up presenting w/ RUE + RLE cellulitis. patient currently under the care of derm for w/u of bullous pemphigoid, sp bx pending result, has been on steroids. pt started developing blisters 6mo ago, prior to that no hx of lesions. noted recently to have superimposed erythema over the RUE + RLE concerning for infection prompting presentation. denies fevers, chills, headache, lightheadedness, dizziness, cp, palpitations, sob, dyspnea, abd pain, n/v/d. admits to pain, chronic, and multiple blisters, both forming and healing in various stages throughout skin. (08 Aug 2019 15:34)    REVIEW OF SYSTEMS:    Patient denied fever, chills, abdominal pain, nausea, vomiting, cough, shortness of breath, chest pain or palpitations    Vital Signs Last 24 Hrs  T(C): 36.4 (11 Aug 2019 10:12), Max: 37.2 (10 Aug 2019 22:46)  T(F): 97.5 (11 Aug 2019 10:12), Max: 98.9 (10 Aug 2019 22:46)  HR: 80 (11 Aug 2019 10:12) (80 - 93)  BP: 106/65 (11 Aug 2019 10:12) (106/65 - 164/77)  BP(mean): --  RR: 18 (11 Aug 2019 10:12) (18 - 19)  SpO2: 94% (11 Aug 2019 10:12) (91% - 96%)I&O's Summary    10 Aug 2019 07:01  -  11 Aug 2019 07:00  --------------------------------------------------------  IN: 0 mL / OUT: 300 mL / NET: -300 mL      PHYSICAL EXAM:  GENERAL: Obese  HEENT: PERRL, +EOMI, anicteric, no [place]  NECK: Supple, No JVD   CHEST/LUNG: CTA bilaterally; Normal effort  HEART: S1S2 Normal intensity, no murmurs, gallops or rubs noted  ABDOMEN: Soft, BS Normoactive, NT, ND, no HSM noted  EXTREMITIES:  2+ radial and DP pulses noted, no clubbing, cyanosis, or edema noted, Limited mobility   SKIN: vesicular and bullous skin eruptions.  Back extremities and perineum.   NEURO: A&O, no focal deficits noted, CN II-XII intact  PSYCH: Depressed mood and affect; insight/judgement appropriate  LABS:                        11.3   4.71  )-----------( 356      ( 10 Aug 2019 06:27 )             35.7     08-10    136  |  103  |  47.0<H>  ----------------------------<  147<H>  5.0   |  24.0  |  1.81<H>    Ca    8.8      10 Aug 2019 06:27          RADIOLOGY & ADDITIONAL TESTS:    MEDICATIONS:  MEDICATIONS  (STANDING):  ascorbic acid 500 milliGRAM(s) Oral daily  calcium carbonate 1250 mG  + Vitamin D (OsCal 500 + D) 1 Tablet(s) Oral two times a day  carbidopa/levodopa  25/250 1 Tablet(s) Oral three times a day  fenofibrate Tablet 48 milliGRAM(s) Oral daily  finasteride 5 milliGRAM(s) Oral daily  gabapentin 300 milliGRAM(s) Oral three times a day  heparin  Injectable 5000 Unit(s) SubCutaneous every 12 hours  multivitamin 1 Tablet(s) Oral daily  omega-3-Acid Ethyl Esters 2 Gram(s) Oral two times a day  pantoprazole    Tablet 40 milliGRAM(s) Oral two times a day  predniSONE   Tablet 60 milliGRAM(s) Oral daily  pyridoxine 100 milliGRAM(s) Oral daily  saccharomyces boulardii 250 milliGRAM(s) Oral two times a day  sodium chloride 0.9%. 1000 milliLiter(s) (75 mL/Hr) IV Continuous <Continuous>  tamsulosin 0.4 milliGRAM(s) Oral at bedtime    MEDICATIONS  (PRN):  acetaminophen   Tablet .. 650 milliGRAM(s) Oral every 6 hours PRN Temp greater or equal to 38C (100.4F), Mild Pain (1 - 3), Moderate Pain (4 - 6), Severe Pain (7 - 10)  acetaminophen   Tablet .. 650 milliGRAM(s) Oral every 6 hours PRN Temp greater or equal to 38C (100.4F), Mild Pain (1 - 3)  traMADol 25 milliGRAM(s) Oral every 8 hours PRN Moderate Pain (4 - 6)  traMADol 50 milliGRAM(s) Oral every 8 hours PRN Severe Pain (7 - 10)
INFECTIOUS DISEASES AND INTERNAL MEDICINE at [facility]  =======================================================  Antwon Lino MD  Diplomates [employer] Board of Internal Medicine and Infectious Diseases  Telephone [phone]  Fax            [phone]  =======================================================    JAH WARD [id number]    Follow up: bullous pemphigoid      Allergies:  aspirin (Other (Mild))      Medications:  acetaminophen   Tablet .. 650 milliGRAM(s) Oral every 6 hours PRN  acetaminophen   Tablet .. 650 milliGRAM(s) Oral every 6 hours PRN  ascorbic acid 500 milliGRAM(s) Oral daily  calcium carbonate 1250 mG  + Vitamin D (OsCal 500 + D) 1 Tablet(s) Oral daily  carbidopa/levodopa  25/250 1 Tablet(s) Oral three times a day  ceFAZolin   IVPB      ceFAZolin   IVPB 1000 milliGRAM(s) IV Intermittent every 24 hours  fenofibrate Tablet 48 milliGRAM(s) Oral daily  finasteride 5 milliGRAM(s) Oral daily  gabapentin 300 milliGRAM(s) Oral three times a day  heparin  Injectable 5000 Unit(s) SubCutaneous every 12 hours  multivitamin 1 Tablet(s) Oral daily  omega-3-Acid Ethyl Esters 2 Gram(s) Oral two times a day  pantoprazole    Tablet 40 milliGRAM(s) Oral two times a day  pyridoxine 100 milliGRAM(s) Oral daily  saccharomyces boulardii 250 milliGRAM(s) Oral two times a day  sodium chloride 0.9%. 1000 milliLiter(s) IV Continuous <Continuous>  tamsulosin 0.4 milliGRAM(s) Oral at bedtime  traMADol 25 milliGRAM(s) Oral every 8 hours PRN  traMADol 50 milliGRAM(s) Oral every 8 hours PRN    SOCIAL       FAMILY   FAMILY HISTORY:  No pertinent family history in first degree relatives    REVIEW OF SYSTEMS:  CONSTITUTIONAL:  No Fever or chills  HEENT:   No diplopia or blurred vision.  No earache, sore throat or runny nose.  CARDIOVASCULAR:  No pressure, squeezing, strangling, tightness, heaviness or aching about the chest, neck, axilla or epigastrium.  RESPIRATORY:  No cough, shortness of breath, PND or orthopnea.  GASTROINTESTINAL:  No nausea, vomiting or diarrhea.  GENITOURINARY:  No dysuria, frequency or urgency. No Blood in urine  MUSCULOSKELETAL:   AS PER HPI  SKIN:  No change in skin, hair or nails.  NEUROLOGIC:  No paresthesias, fasciculations, seizures or weakness.  PSYCHIATRIC:  No disorder of thought or mood.  ENDOCRINE:  No heat or cold intolerance, polyuria or polydipsia.  HEMATOLOGICAL:  No easy bruising or bleeding.            Physical Exam:  I Vital Signs Last 24 Hrs  T(C): 36.4 (11 Aug 2019 10:12), Max: 37.2 (10 Aug 2019 22:46)  T(F): 97.5 (11 Aug 2019 10:12), Max: 98.9 (10 Aug 2019 22:46)  HR: 80 (11 Aug 2019 10:12) (80 - 93)  BP: 106/65 (11 Aug 2019 10:12) (106/65 - 164/77)  BP(mean): --  RR: 18 (11 Aug 2019 10:12) (18 - 19)  SpO2: 94% (11 Aug 2019 10:12) (91% - 96%)    GEN: NAD,   HEENT: normocephalic and atraumatic. EOMI. [place]ER[place].    NECK: Supple. No carotid bruits.  No lymphadenopathy or thyromegaly.  LUNGS: Clear to auscultation.  HEART: Regular rate and rhythm without murmur.  ABDOMEN: Soft, nontender, and nondistended.  Positive bowel sounds.    : No CVA tenderness  EXTREMITIES: Without any cyanosis, clubbing, rash, lesions or edema.  MSK: no joint swelling  NEUROLOGIC: Cranial nerves II through XII are grossly intact.  PSYCHIATRIC: Appropriate affect .  SKIN: WRAPPED .        Labs:  0                       11.3   4.71  )-----------( 356      ( 10 Aug 2019 06:27 )             35.7   08-10    136  |  103  |  47.0<H>  ----------------------------<  147<H>  5.0   |  24.0  |  1.81<H>    Ca    8.8      10 Aug 2019 06:27            Urinalysis Basic - ( 07 Aug 2019 20:12 )    Color: Yellow / Appearance: Clear / S.015 / pH: x  Gluc: x / Ketone: Trace  / Bili: Negative / Urobili: Negative mg/dL   Blood: x / Protein: 15 mg/dL / Nitrite: Negative   Leuk Esterase: Trace / RBC: 0-2 /HPF / WBC 0-2   Sq Epi: x / Non Sq Epi: Occasional / Bacteria: x      LIVER FUNCTIONS - ( 07 Aug 2019 19:17 )  Alb: 3.3 g/dL / Pro: 6.2 g/dL / ALK PHOS: 44 U/L / ALT: <5 U/L / AST: 39 U/L / GGT: x           CARDIAC MARKERS ( 07 Aug 2019 19:17 )  x     / x     / 351 U/L / x     / 9.5 ng/mL      CAPILLARY BLOOD GLUCOSE            RECENT CULTURES:   @ 20:12 .Urine Enterococcus faecalis (vancomycin resistant)    >100,000 CFU/ml Enterococcus faecalis (vancomycin resistant)
INFECTIOUS DISEASES AND INTERNAL MEDICINE at [facility]  =======================================================  Antwon Lino MD  Diplomates [employer] Board of Internal Medicine and Infectious Diseases  Telephone [phone]  Fax            [phone]  =======================================================    JAH WARD [id number]    Follow up: cellulitis ? bullous pemphigoid    Allergies:  aspirin (Other (Mild))      Medications:  acetaminophen   Tablet .. 650 milliGRAM(s) Oral every 6 hours PRN  acetaminophen   Tablet .. 650 milliGRAM(s) Oral every 6 hours PRN  ascorbic acid 500 milliGRAM(s) Oral daily  calcium carbonate 1250 mG  + Vitamin D (OsCal 500 + D) 1 Tablet(s) Oral daily  carbidopa/levodopa  25/250 1 Tablet(s) Oral three times a day  ceFAZolin   IVPB      ceFAZolin   IVPB 1000 milliGRAM(s) IV Intermittent every 24 hours  fenofibrate Tablet 48 milliGRAM(s) Oral daily  finasteride 5 milliGRAM(s) Oral daily  gabapentin 300 milliGRAM(s) Oral three times a day  heparin  Injectable 5000 Unit(s) SubCutaneous every 12 hours  multivitamin 1 Tablet(s) Oral daily  omega-3-Acid Ethyl Esters 2 Gram(s) Oral two times a day  pantoprazole    Tablet 40 milliGRAM(s) Oral two times a day  pyridoxine 100 milliGRAM(s) Oral daily  saccharomyces boulardii 250 milliGRAM(s) Oral two times a day  sodium chloride 0.9%. 1000 milliLiter(s) IV Continuous <Continuous>  tamsulosin 0.4 milliGRAM(s) Oral at bedtime  traMADol 25 milliGRAM(s) Oral every 8 hours PRN  traMADol 50 milliGRAM(s) Oral every 8 hours PRN    SOCIAL       FAMILY   FAMILY HISTORY:  No pertinent family history in first degree relatives    REVIEW OF SYSTEMS:  CONSTITUTIONAL:  No Fever or chills  HEENT:   No diplopia or blurred vision.  No earache, sore throat or runny nose.  CARDIOVASCULAR:  No pressure, squeezing, strangling, tightness, heaviness or aching about the chest, neck, axilla or epigastrium.  RESPIRATORY:  No cough, shortness of breath, PND or orthopnea.  GASTROINTESTINAL:  No nausea, vomiting or diarrhea.  GENITOURINARY:  No dysuria, frequency or urgency. No Blood in urine  MUSCULOSKELETAL:   AS PER HPI  SKIN:  No change in skin, hair or nails.  NEUROLOGIC:  No paresthesias, fasciculations, seizures or weakness.  PSYCHIATRIC:  No disorder of thought or mood.  ENDOCRINE:  No heat or cold intolerance, polyuria or polydipsia.  HEMATOLOGICAL:  No easy bruising or bleeding.            Physical Exam:  ICU Vital Signs Last 24 Hrs  T(C): 37 (09 Aug 2019 08:45), Max: 37.4 (08 Aug 2019 22:02)  T(F): 98.6 (09 Aug 2019 08:45), Max: 99.3 (08 Aug 2019 22:02)  HR: 74 (09 Aug 2019 08:45) (74 - 88)  BP: 123/65 (09 Aug 2019 08:45) (108/67 - 147/76)  BP(mean): --  ABP: --  ABP(mean): --  RR: 20 (09 Aug 2019 08:45) (18 - 22)  SpO2: 93% (08 Aug 2019 22:02) (93% - 98%)    GEN: NAD,   HEENT: normocephalic and atraumatic. EOMI. [place].    NECK: Supple. No carotid bruits.  No lymphadenopathy or thyromegaly.  LUNGS: Clear to auscultation.  HEART: Regular rate and rhythm without murmur.  ABDOMEN: Soft, nontender, and nondistended.  Positive bowel sounds.    : No CVA tenderness  EXTREMITIES: Without any cyanosis, clubbing, rash, lesions or edema.  MSK: no joint swelling  NEUROLOGIC: Cranial nerves II through XII are grossly intact.  PSYCHIATRIC: Appropriate affect .  SKIN: No ulceration or induration present.        Labs:      137  |  105  |  57.0<H>  ----------------------------<  108  4.7   |  23.0  |  2.17<H>    Ca    9.1      09 Aug 2019 07:55  Phos  2.5     -  Mg     2.1     -    TPro  6.2<L>  /  Alb  3.3  /  TBili  0.4  /  DBili  x   /  AST  39  /  ALT  <5  /  AlkPhos  44  -                          10.5   6.70  )-----------( 334      ( 09 Aug 2019 07:55 )             33.2         Urinalysis Basic - ( 07 Aug 2019 20:12 )    Color: Yellow / Appearance: Clear / S.015 / pH: x  Gluc: x / Ketone: Trace  / Bili: Negative / Urobili: Negative mg/dL   Blood: x / Protein: 15 mg/dL / Nitrite: Negative   Leuk Esterase: Trace / RBC: 0-2 /HPF / WBC 0-2   Sq Epi: x / Non Sq Epi: Occasional / Bacteria: x      LIVER FUNCTIONS - ( 07 Aug 2019 19:17 )  Alb: 3.3 g/dL / Pro: 6.2 g/dL / ALK PHOS: 44 U/L / ALT: <5 U/L / AST: 39 U/L / GGT: x           CARDIAC MARKERS ( 07 Aug 2019 19:17 )  x     / x     / 351 U/L / x     / 9.5 ng/mL      CAPILLARY BLOOD GLUCOSE            RECENT CULTURES:   @ 20:12 .Urine Enterococcus faecalis (vancomycin resistant)    >100,000 CFU/ml Enterococcus faecalis (vancomycin resistant)

## 2019-08-11 NOTE — PROGRESS NOTE ADULT - ASSESSMENT
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     #Bullous eruptions pemphigoid type  -superimposed on areas of open skin sec to suspected bullous pemphigoid  Derm recommend Prednisone 60mg po daily 5 days and then 40mg po daily till see in derm clinic 8/23/19 at which time derm will begin to latisha off steroid .   Derm recommend to dc abx  -ID eval appreciated. Will dc abx secondary to derm recommendation  -blood cx x2 pending  -cbc w/ diff, esr, crp in am    #efrain on ckd vs. ckd 3/4.  -baseline cr 1.5 but from 2018, unclear if has progressed since then naturally  -gentle hydration, ua unremarkable  -serial bmp, avoid nephrotoxic meds    #parkinsons. stable  Carbidopa/Levodopa    #htn. controlled
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     #Bullous eruptions pemphigoid type  -superimposed on areas of open skin sec to suspected bullous pemphigoid  Derm recommend Prednisone 60mg po daily 5 days and then 40mg po daily till see in derm clinic 8/23/19 at which time derm will begin to latisha off steroid .   Derm recommend to dc abx  -ID eval appreciated. Will dc abx secondary to derm recommendation    # UTI   Enteroccocus fecalis VRE  ID consult not impressed and considering colonization and no indication for antibiotic therapy at this time.  No urinary symptoms , no constitutional symptoms.      #efrain on ckd vs. ckd 3/4.  -baseline cr 1.5 but from 2018, unclear if has progressed since then naturally  -gentle hydration,   -serial bmp, avoid nephrotoxic meds    #parkinsons. stable  Carbidopa/Levodopa    #htn. controlled    Disposition: Discharge home tomorrow in AM.  Patient has home health.  Dermatology recommend home health nurse visit for 2-3 weeks for wound care - vaseline gauz to vesicular eruptions.  Patient to follow up with Dermatology in office 8/23/19 .  Continue po steroid for recommendation.
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     #Bullous eruptions pemphigoid type  -superimposed on areas of open skin sec to suspected bullous pemphigoid  Derm recommend Prednisone 60mg po daily 5 days and then 40mg po daily till see in derm clinic 8/23/19 at which time derm will begin to latisha off steroid .   Derm recommend to dc abx  -ID eval appreciated. Will dc abx secondary to derm recommendation  -blood cx x2 pending  -cbc w/ diff, esr, crp in am    # UTI   Enteroccocus fecalis VRE  Obtain ID consult     #efrain on ckd vs. ckd 3/4.  -baseline cr 1.5 but from 2018, unclear if has progressed since then naturally  -gentle hydration,   -serial bmp, avoid nephrotoxic meds    #parkinsons. stable  Carbidopa/Levodopa    #htn. controlled
Patient presents to the ED for generalized weakness.  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  Patient has 24 hour care at home but is developing bullae on areas of pressure from trying to maneuvor in the house. These wounds are chronic.  VSS.  family bedside.  otheriwse baseline.  Non toxic.  Well appearing. No aggravating or relieving factors. No other pertinent PMH.  No other pertinent PSH.  No other pertinent FHx.  Patient denies EtOH/tobacco/illicit substance use. No fever/chills, No photophobia/eye pain/changes in vision, No ear pain/sore throat/dysphagia, No chest pain/palpitations, no SOB/cough/wheeze/stridor, No abdominal pain, No N/V/D, no dysuria/frequency/discharge, No neck/back pain no changes in neurological status/function.  AREAS OF LOWER EXTREMITY AND RIGHT UPPER EXTREMITY WITH EDEMA AND ERYTHEMA    I AM CONCERNED ABOUT SUPERIMPOSED CELLULITIS WILL    CONTINUE IV ABX KEFZOL   SUGGEST DERM EVAL WILL FOLLOW
Patient presents to the ED for generalized weakness.  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  Patient has 24 hour care at home but is developing bullae on areas of pressure from trying to maneuvor in the house. These wounds are chronic.  VSS.  family bedside.  otheriwse baseline.  Non toxic.  Well appearing. No aggravating or relieving factors. No other pertinent PMH.  No other pertinent PSH.  No other pertinent FHx.  Patient denies EtOH/tobacco/illicit substance use. No fever/chills, No photophobia/eye pain/changes in vision, No ear pain/sore throat/dysphagia, No chest pain/palpitations, no SOB/cough/wheeze/stridor, No abdominal pain, No N/V/D, no dysuria/frequency/discharge, No neck/back pain no changes in neurological status/function.  AREAS OF LOWER EXTREMITY AND RIGHT UPPER WRAPPED     IPT SEEN BY DERM AND STARTED ON STEROIDS AGREE D/C ABX  VRE IN URINE PROBABLE COLONIZATION NO  SYMPTOMS   ASX BACTERURIA WOULD DEFER Treatment  PT WILL FOLLOW UP WITH DERM   WILL FOLLOW UP AS NEEDED   PLEASE CALL IF Questions

## 2019-08-12 ENCOUNTER — TRANSCRIPTION ENCOUNTER (OUTPATIENT)
Age: 84
End: 2019-08-12

## 2019-08-12 VITALS
RESPIRATION RATE: 19 BRPM | OXYGEN SATURATION: 96 % | TEMPERATURE: 99 F | HEART RATE: 85 BPM | SYSTOLIC BLOOD PRESSURE: 131 MMHG | DIASTOLIC BLOOD PRESSURE: 75 MMHG

## 2019-08-12 LAB
ANION GAP SERPL CALC-SCNC: 9 MMOL/L — SIGNIFICANT CHANGE UP (ref 5–17)
APPEARANCE UR: CLEAR — SIGNIFICANT CHANGE UP
BILIRUB UR-MCNC: NEGATIVE — SIGNIFICANT CHANGE UP
BUN SERPL-MCNC: 51 MG/DL — HIGH (ref 8–20)
CALCIUM SERPL-MCNC: 9.3 MG/DL — SIGNIFICANT CHANGE UP (ref 8.6–10.2)
CHLORIDE SERPL-SCNC: 104 MMOL/L — SIGNIFICANT CHANGE UP (ref 98–107)
CO2 SERPL-SCNC: 24 MMOL/L — SIGNIFICANT CHANGE UP (ref 22–29)
COLOR SPEC: YELLOW — SIGNIFICANT CHANGE UP
CREAT SERPL-MCNC: 1.73 MG/DL — HIGH (ref 0.5–1.3)
CULTURE RESULTS: SIGNIFICANT CHANGE UP
CULTURE RESULTS: SIGNIFICANT CHANGE UP
DIFF PNL FLD: NEGATIVE — SIGNIFICANT CHANGE UP
GLUCOSE SERPL-MCNC: 92 MG/DL — SIGNIFICANT CHANGE UP (ref 70–115)
GLUCOSE UR QL: NEGATIVE MG/DL — SIGNIFICANT CHANGE UP
HCT VFR BLD CALC: 33.9 % — LOW (ref 39–50)
HGB BLD-MCNC: 10.8 G/DL — LOW (ref 13–17)
KETONES UR-MCNC: NEGATIVE — SIGNIFICANT CHANGE UP
LEUKOCYTE ESTERASE UR-ACNC: NEGATIVE — SIGNIFICANT CHANGE UP
MCHC RBC-ENTMCNC: 31.9 GM/DL — LOW (ref 32–36)
MCHC RBC-ENTMCNC: 32 PG — SIGNIFICANT CHANGE UP (ref 27–34)
MCV RBC AUTO: 100.6 FL — HIGH (ref 80–100)
NITRITE UR-MCNC: NEGATIVE — SIGNIFICANT CHANGE UP
PH UR: 6 — SIGNIFICANT CHANGE UP (ref 5–8)
PLATELET # BLD AUTO: 384 K/UL — SIGNIFICANT CHANGE UP (ref 150–400)
POTASSIUM SERPL-MCNC: 5 MMOL/L — SIGNIFICANT CHANGE UP (ref 3.5–5.3)
POTASSIUM SERPL-SCNC: 5 MMOL/L — SIGNIFICANT CHANGE UP (ref 3.5–5.3)
PROT UR-MCNC: NEGATIVE MG/DL — SIGNIFICANT CHANGE UP
RBC # BLD: 3.37 M/UL — LOW (ref 4.2–5.8)
RBC # FLD: 13.3 % — SIGNIFICANT CHANGE UP (ref 10.3–14.5)
SODIUM SERPL-SCNC: 137 MMOL/L — SIGNIFICANT CHANGE UP (ref 135–145)
SP GR SPEC: 1.01 — SIGNIFICANT CHANGE UP (ref 1.01–1.02)
SPECIMEN SOURCE: SIGNIFICANT CHANGE UP
SPECIMEN SOURCE: SIGNIFICANT CHANGE UP
UROBILINOGEN FLD QL: NEGATIVE MG/DL — SIGNIFICANT CHANGE UP
WBC # BLD: 10.61 K/UL — HIGH (ref 3.8–10.5)
WBC # FLD AUTO: 10.61 K/UL — HIGH (ref 3.8–10.5)

## 2019-08-12 PROCEDURE — 99239 HOSP IP/OBS DSCHRG MGMT >30: CPT

## 2019-08-12 RX ORDER — ASCORBIC ACID 60 MG
1 TABLET,CHEWABLE ORAL
Qty: 0 | Refills: 0 | DISCHARGE

## 2019-08-12 RX ORDER — ACETAMINOPHEN 500 MG
2 TABLET ORAL
Qty: 0 | Refills: 0 | DISCHARGE
Start: 2019-08-12

## 2019-08-12 RX ORDER — FINASTERIDE 5 MG/1
1 TABLET, FILM COATED ORAL
Qty: 0 | Refills: 0 | DISCHARGE
Start: 2019-08-12

## 2019-08-12 RX ORDER — POLYETHYLENE GLYCOL 3350 17 G/17G
17 POWDER, FOR SOLUTION ORAL
Qty: 510 | Refills: 0
Start: 2019-08-12 | End: 2019-09-10

## 2019-08-12 RX ORDER — INDAPAMIDE 1.25 MG
1 TABLET ORAL
Qty: 0 | Refills: 0 | DISCHARGE

## 2019-08-12 RX ORDER — FENOFIBRATE,MICRONIZED 130 MG
1 CAPSULE ORAL
Qty: 0 | Refills: 0 | DISCHARGE

## 2019-08-12 RX ORDER — PANTOPRAZOLE SODIUM 20 MG/1
1 TABLET, DELAYED RELEASE ORAL
Qty: 0 | Refills: 0 | DISCHARGE

## 2019-08-12 RX ORDER — PYRIDOXINE HCL (VITAMIN B6) 100 MG
1 TABLET ORAL
Qty: 0 | Refills: 0 | DISCHARGE

## 2019-08-12 RX ORDER — CARBIDOPA AND LEVODOPA 25; 100 MG/1; MG/1
1 TABLET ORAL
Qty: 0 | Refills: 0 | DISCHARGE
Start: 2019-08-12

## 2019-08-12 RX ORDER — TRAMADOL HYDROCHLORIDE 50 MG/1
0.5 TABLET ORAL
Qty: 10.5 | Refills: 0
Start: 2019-08-12 | End: 2019-08-18

## 2019-08-12 RX ORDER — TAMSULOSIN HYDROCHLORIDE 0.4 MG/1
1 CAPSULE ORAL
Qty: 0 | Refills: 0 | DISCHARGE
Start: 2019-08-12

## 2019-08-12 RX ORDER — ASCORBIC ACID 60 MG
1 TABLET,CHEWABLE ORAL
Qty: 0 | Refills: 0 | DISCHARGE
Start: 2019-08-12

## 2019-08-12 RX ORDER — FINASTERIDE 5 MG/1
1 TABLET, FILM COATED ORAL
Qty: 0 | Refills: 0 | DISCHARGE

## 2019-08-12 RX ORDER — PYRIDOXINE HCL (VITAMIN B6) 100 MG
1 TABLET ORAL
Qty: 0 | Refills: 0 | DISCHARGE
Start: 2019-08-12

## 2019-08-12 RX ORDER — PANTOPRAZOLE SODIUM 20 MG/1
1 TABLET, DELAYED RELEASE ORAL
Qty: 60 | Refills: 0
Start: 2019-08-12 | End: 2019-09-10

## 2019-08-12 RX ORDER — FENOFIBRATE,MICRONIZED 130 MG
1 CAPSULE ORAL
Qty: 0 | Refills: 0 | DISCHARGE
Start: 2019-08-12

## 2019-08-12 RX ORDER — IBUPROFEN 200 MG
1 TABLET ORAL
Qty: 0 | Refills: 0 | DISCHARGE

## 2019-08-12 RX ORDER — OMEGA-3 ACID ETHYL ESTERS 1 G
1200 CAPSULE ORAL
Qty: 0 | Refills: 0 | DISCHARGE

## 2019-08-12 RX ORDER — DICLOFENAC SODIUM 75 MG/1
1 TABLET, DELAYED RELEASE ORAL
Qty: 0 | Refills: 0 | DISCHARGE

## 2019-08-12 RX ORDER — CARBIDOPA AND LEVODOPA 25; 100 MG/1; MG/1
1 TABLET ORAL
Qty: 0 | Refills: 0 | DISCHARGE

## 2019-08-12 RX ORDER — GABAPENTIN 400 MG/1
1 CAPSULE ORAL
Qty: 0 | Refills: 0 | DISCHARGE
Start: 2019-08-12

## 2019-08-12 RX ORDER — MULTIVIT-MIN/FERROUS GLUCONATE 9 MG/15 ML
1 LIQUID (ML) ORAL
Qty: 0 | Refills: 0 | DISCHARGE

## 2019-08-12 RX ORDER — OMEGA-3 ACID ETHYL ESTERS 1 G
2 CAPSULE ORAL
Qty: 0 | Refills: 0 | DISCHARGE
Start: 2019-08-12

## 2019-08-12 RX ADMIN — Medication 1 TABLET(S): at 05:17

## 2019-08-12 RX ADMIN — Medication 60 MILLIGRAM(S): at 05:18

## 2019-08-12 RX ADMIN — Medication 10 MILLIGRAM(S): at 11:53

## 2019-08-12 RX ADMIN — Medication 1 TABLET(S): at 11:53

## 2019-08-12 RX ADMIN — GABAPENTIN 300 MILLIGRAM(S): 400 CAPSULE ORAL at 05:18

## 2019-08-12 RX ADMIN — Medication 48 MILLIGRAM(S): at 11:53

## 2019-08-12 RX ADMIN — GABAPENTIN 300 MILLIGRAM(S): 400 CAPSULE ORAL at 13:04

## 2019-08-12 RX ADMIN — PANTOPRAZOLE SODIUM 40 MILLIGRAM(S): 20 TABLET, DELAYED RELEASE ORAL at 05:18

## 2019-08-12 RX ADMIN — Medication 100 MILLIGRAM(S): at 11:53

## 2019-08-12 RX ADMIN — Medication 650 MILLIGRAM(S): at 12:50

## 2019-08-12 RX ADMIN — CARBIDOPA AND LEVODOPA 1 TABLET(S): 25; 100 TABLET ORAL at 13:04

## 2019-08-12 RX ADMIN — FINASTERIDE 5 MILLIGRAM(S): 5 TABLET, FILM COATED ORAL at 11:53

## 2019-08-12 RX ADMIN — Medication 650 MILLIGRAM(S): at 05:28

## 2019-08-12 RX ADMIN — HEPARIN SODIUM 5000 UNIT(S): 5000 INJECTION INTRAVENOUS; SUBCUTANEOUS at 05:18

## 2019-08-12 RX ADMIN — CARBIDOPA AND LEVODOPA 1 TABLET(S): 25; 100 TABLET ORAL at 05:17

## 2019-08-12 RX ADMIN — Medication 2 GRAM(S): at 05:44

## 2019-08-12 RX ADMIN — Medication 650 MILLIGRAM(S): at 05:58

## 2019-08-12 RX ADMIN — Medication 500 MILLIGRAM(S): at 11:53

## 2019-08-12 RX ADMIN — Medication 650 MILLIGRAM(S): at 11:52

## 2019-08-12 RX ADMIN — Medication 250 MILLIGRAM(S): at 05:17

## 2019-08-12 NOTE — DISCHARGE NOTE NURSING/CASE MANAGEMENT/SOCIAL WORK - NSDCDPATPORTLINK_GEN_ALL_CORE
You can access the [facility] Patient Portal, offered by [facility], by registering with the following website: [email or web address] Network

## 2019-08-12 NOTE — DISCHARGE NOTE PROVIDER - CARE PROVIDERS DIRECT ADDRESSES
[email or web address]

## 2019-08-12 NOTE — DISCHARGE NOTE NURSING/CASE MANAGEMENT/SOCIAL WORK - NSDCPEEMAIL_GEN_ALL_CORE
[facility] for Tobacco Control email [email or web address]

## 2019-08-12 NOTE — DISCHARGE NOTE PROVIDER - NSDCCPCAREPLAN_GEN_ALL_CORE_FT
PRINCIPAL DISCHARGE DIAGNOSIS  Diagnosis: Bullous pemphigus  Assessment and Plan of Treatment: Continue with steroids as prescribed. Please continue with medication for acid reduction and protection while on steroids. Please follow up with the dermatologist with scheduled appointment on the 23rd. Please continue with wound care.      SECONDARY DISCHARGE DIAGNOSES  Diagnosis: CKD stage G3b/A1, GFR 30-44 and albumin creatinine ratio <30 mg/g  Assessment and Plan of Treatment: Continue with monitoring kidney function closely. At baseline for the patient. Avoid kidney toxic medications. Avoid iburopofen/ alleve/ advil. Use tylenol if needed.    Diagnosis: Essential hypertension  Assessment and Plan of Treatment: Continue with home medications    Diagnosis: Parkinson disease  Assessment and Plan of Treatment: Continue with home medications. Follow up with the neurologist.    Diagnosis: BPH (benign prostatic hyperplasia)  Assessment and Plan of Treatment: Continue with home medications. Follow up with the urologist.

## 2019-08-12 NOTE — DISCHARGE NOTE PROVIDER - NSDCHHNEEDSERVICEOTHER_GEN_ALL_CORE_FT
cleansing with saline then applying petrolatum gauze held in place with Kerlix wrap changed once daily.

## 2019-08-12 NOTE — DISCHARGE NOTE NURSING/CASE MANAGEMENT/SOCIAL WORK - NSDCPEWEB_GEN_ALL_CORE
[facility] for Tobacco Control website --- [email or web address] website --- [email or web address]

## 2019-08-12 NOTE — DISCHARGE NOTE PROVIDER - HOSPITAL COURSE
88yo M w/ hx ckd 3/4, parkinsons,macular degeneration, bph, htn, barretts, hx uti/pyelonephritis, and suspected bullous pemphigoid currently being worked up presenting w/ RUE + RLE cellulitis. patient currently under the care of derm for w/u of bullous pemphigoid, sp bx pending result, has been on topical halobetasol (ultrapotent corticosteroid). He started developing blisters 6mo ago, prior to that no hx of lesions. noted recently to have superimposed erythema over the RUE + RLE concerning for infection prompting ER admission. Initially was empirically placed on IV abx, ID and dermatology consulted. Evaluated by derm and current exam Bullous pemphigoid, likely.  Differential diagnosis would include other subepidermal bullous dermatoses such as epidermolysis bullosa acquisita and linear IgA bullous dermatosis.  It is not likely to be drug induced because the medications that he has been taking have not been reported to cause pemphigoid or linear IgA bullous dermatosis. Pt placed on high dose prednisone 60mg po qd x 5 days started on 8/9 and thereafter to be dropped to 40mg po qd until seen by derm in the office on 8/23 to c/w management and care. Pt provided with home care for wound care. Also hospital course further complicated by negative UA but U cx resulted with VRE. For which ID consulted and likely colonization. Pt also had issues with decreased urinary stream/ frequency due to underlying BPH for which urology consulted. Recommendation for straight cath but pt reported this improves when he mobilizes and will be home. Pt given referral for derm/ ID and advised to f/u with urology and PMD in 1 week. Pt medically stable to be discharged home.             Vital Signs Last 24 Hrs    T(C): 36.5 (12 Aug 2019 08:20), Max: 36.8 (11 Aug 2019 15:53)    T(F): 97.7 (12 Aug 2019 08:20), Max: 98.2 (11 Aug 2019 15:53)    HR: 106 (12 Aug 2019 08:20) (82 - 106)    BP: 139/84 (12 Aug 2019 08:20) (139/84 - 168/82)    BP(mean): --    RR: 18 (11 Aug 2019 23:41) (18 - 18)    SpO2: 95% (11 Aug 2019 23:41) (95% - 97%)            GENERAL: WD WN  NAD, obese    HEENT: [place]     LUNGS: CTA b/l no WRR     HEART: s1 s2 RR no MRG     GASTROENTEROLOGY: Soft nt nd bs +normoactive     EXTREMITIES:  b/l le +2 pitting edema     SKIN: numerous tense bullae of right palm and wrist and several erosions of bilateral upper back, axillae, upper arms (R>L) scrotum with tense bulla right plantar surface            Discharge planning took 46 minutes

## 2019-08-12 NOTE — DISCHARGE NOTE PROVIDER - NSDCFUADDAPPT_GEN_ALL_CORE_FT
Please follow up with Dr. Tidwell with scheduled appointment on 8/23/19. Please follow up with your primary care physician in 3-5 days.

## 2019-08-12 NOTE — DISCHARGE NOTE PROVIDER - CARE PROVIDER_API CALL
Rich Tidwell)  Dermatology  [address] [place] [address]  Phone: [phone]  Fax: [phone]  Follow Up Time:     Cornelius Babcock)  Infectious Disease; Internal Medicine  [address] [place] [address], [address] [place] [address]  Phone: [phone]  Fax: [phone]  Follow Up Time:

## 2019-08-13 ENCOUNTER — APPOINTMENT (OUTPATIENT)
Dept: DERMATOLOGY | Facility: CLINIC | Age: 84
End: 2019-08-13

## 2019-08-23 ENCOUNTER — APPOINTMENT (OUTPATIENT)
Dept: DERMATOLOGY | Facility: CLINIC | Age: 84
End: 2019-08-23
Payer: MEDICARE

## 2019-08-23 PROCEDURE — 99213 OFFICE O/P EST LOW 20 MIN: CPT

## 2019-09-06 ENCOUNTER — APPOINTMENT (OUTPATIENT)
Dept: DERMATOLOGY | Facility: CLINIC | Age: 84
End: 2019-09-06

## 2019-09-29 PROCEDURE — 97116 GAIT TRAINING THERAPY: CPT

## 2019-09-29 PROCEDURE — 36415 COLL VENOUS BLD VENIPUNCTURE: CPT

## 2019-09-29 PROCEDURE — 97163 PT EVAL HIGH COMPLEX 45 MIN: CPT

## 2019-09-29 PROCEDURE — 86140 C-REACTIVE PROTEIN: CPT

## 2019-09-29 PROCEDURE — 97530 THERAPEUTIC ACTIVITIES: CPT

## 2019-09-29 PROCEDURE — 85027 COMPLETE CBC AUTOMATED: CPT

## 2019-09-29 PROCEDURE — 71045 X-RAY EXAM CHEST 1 VIEW: CPT

## 2019-09-29 PROCEDURE — 87040 BLOOD CULTURE FOR BACTERIA: CPT

## 2019-09-29 PROCEDURE — 96375 TX/PRO/DX INJ NEW DRUG ADDON: CPT

## 2019-09-29 PROCEDURE — 97110 THERAPEUTIC EXERCISES: CPT

## 2019-09-29 PROCEDURE — 87186 SC STD MICRODIL/AGAR DIL: CPT

## 2019-09-29 PROCEDURE — 83605 ASSAY OF LACTIC ACID: CPT

## 2019-09-29 PROCEDURE — 82553 CREATINE MB FRACTION: CPT

## 2019-09-29 PROCEDURE — 85652 RBC SED RATE AUTOMATED: CPT

## 2019-09-29 PROCEDURE — 83735 ASSAY OF MAGNESIUM: CPT

## 2019-09-29 PROCEDURE — 99285 EMERGENCY DEPT VISIT HI MDM: CPT | Mod: 25

## 2019-09-29 PROCEDURE — 81001 URINALYSIS AUTO W/SCOPE: CPT

## 2019-09-29 PROCEDURE — 96374 THER/PROPH/DIAG INJ IV PUSH: CPT

## 2019-09-29 PROCEDURE — 93970 EXTREMITY STUDY: CPT

## 2019-09-29 PROCEDURE — 87086 URINE CULTURE/COLONY COUNT: CPT

## 2019-09-29 PROCEDURE — 82550 ASSAY OF CK (CPK): CPT

## 2019-09-29 PROCEDURE — 96376 TX/PRO/DX INJ SAME DRUG ADON: CPT

## 2019-09-29 PROCEDURE — 81003 URINALYSIS AUTO W/O SCOPE: CPT

## 2019-09-29 PROCEDURE — 84100 ASSAY OF PHOSPHORUS: CPT

## 2019-09-29 PROCEDURE — 80053 COMPREHEN METABOLIC PANEL: CPT

## 2019-09-29 PROCEDURE — 80048 BASIC METABOLIC PNL TOTAL CA: CPT

## 2019-09-29 PROCEDURE — G0378: CPT

## 2019-09-29 PROCEDURE — 93971 EXTREMITY STUDY: CPT

## 2019-11-14 ENCOUNTER — RESULT REVIEW (OUTPATIENT)
Age: 84
End: 2019-11-14

## 2019-11-22 ENCOUNTER — EMERGENCY (EMERGENCY)
Facility: HOSPITAL | Age: 84
LOS: 1 days | Discharge: HOME | End: 2019-11-22
Attending: EMERGENCY MEDICINE
Payer: MEDICARE

## 2019-11-22 VITALS
TEMPERATURE: 98 F | HEART RATE: 81 BPM | SYSTOLIC BLOOD PRESSURE: 175 MMHG | RESPIRATION RATE: 18 BRPM | DIASTOLIC BLOOD PRESSURE: 92 MMHG | OXYGEN SATURATION: 98 %

## 2019-11-22 VITALS
DIASTOLIC BLOOD PRESSURE: 78 MMHG | OXYGEN SATURATION: 100 % | RESPIRATION RATE: 18 BRPM | TEMPERATURE: 98 F | HEART RATE: 93 BPM | SYSTOLIC BLOOD PRESSURE: 134 MMHG

## 2019-11-22 PROCEDURE — 99284 EMERGENCY DEPT VISIT MOD MDM: CPT | Mod: 25

## 2019-11-22 PROCEDURE — 72125 CT NECK SPINE W/O DYE: CPT

## 2019-11-22 PROCEDURE — 70450 CT HEAD/BRAIN W/O DYE: CPT

## 2019-11-22 PROCEDURE — 72125 CT NECK SPINE W/O DYE: CPT | Mod: 26

## 2019-11-22 PROCEDURE — 72100 X-RAY EXAM L-S SPINE 2/3 VWS: CPT | Mod: 26

## 2019-11-22 PROCEDURE — 72100 X-RAY EXAM L-S SPINE 2/3 VWS: CPT

## 2019-11-22 PROCEDURE — 70450 CT HEAD/BRAIN W/O DYE: CPT | Mod: 26

## 2019-11-22 PROCEDURE — 99284 EMERGENCY DEPT VISIT MOD MDM: CPT

## 2019-11-22 PROCEDURE — 97163 PT EVAL HIGH COMPLEX 45 MIN: CPT

## 2019-11-22 RX ORDER — ACETAMINOPHEN 500 MG
975 TABLET ORAL ONCE
Refills: 0 | Status: COMPLETED | OUTPATIENT
Start: 2019-11-22 | End: 2019-11-22

## 2019-11-22 RX ORDER — METHOCARBAMOL 500 MG/1
1 TABLET, FILM COATED ORAL
Qty: 20 | Refills: 0
Start: 2019-11-22 | End: 2019-11-26

## 2019-11-22 RX ORDER — METHOCARBAMOL 500 MG/1
1500 TABLET, FILM COATED ORAL ONCE
Refills: 0 | Status: COMPLETED | OUTPATIENT
Start: 2019-11-22 | End: 2019-11-22

## 2019-11-22 RX ADMIN — METHOCARBAMOL 1500 MILLIGRAM(S): 500 TABLET, FILM COATED ORAL at 11:08

## 2019-11-22 RX ADMIN — Medication 975 MILLIGRAM(S): at 11:08

## 2019-11-22 NOTE — PROVIDER CONTACT NOTE (OTHER) - RECOMMENDATIONS
Pt. unable to stand and ambulate with standard walker.  Request for U-Step walking stabilizer walker.

## 2019-11-22 NOTE — ED PROVIDER NOTE - NS ED MD EM SELECTION
From: Guillermina Jerez  To: Alena Owen MD  Sent: 2/19/2018 1:07 PM CST  Subject: Medication     Hi there,  I reached out a couple weeks ago requesting to potentially adjust my medications and played phone tag. I apologize about that, it is really difficult for me to communicate over the phone during work hours (same as yours). From what the nurse said, it’s my understanding that I need to come in to do this since my PCP is on leave. This is really difficult with my schedule so I am hoping you could refer me to someone with later clinic hours? I’d love to get this done in the next week or two as my symptoms are not improving.   Thank you.    [id number] Detailed

## 2019-11-22 NOTE — ED PROVIDER NOTE - PHYSICAL EXAMINATION
Gen: no acute distress  Head: normocephalic, small 1x1cm hematoma to L occiput  Lung: CTAB, no respiratory distress, no wheezing, rales, rhonchi  CV: normal s1/s2, rrr, no murmurs, Normal perfusion  Abd: soft, non-tender, non-distended  MSK: No edema, no visible deformities, full range of motion in all 4 extremities with pill-rolling tremor, tenderness to bilateral trapezius, tenderness to bilateral c-spine paraspinal muscles, no midline tenderness, dressings in place for to BLE   Back: midline lumbar tenderness  Neuro: No focal neurologic deficits  Skin: No rash   Psych: normal affect

## 2019-11-22 NOTE — ED PROVIDER NOTE - PROGRESS NOTE DETAILS
Patient's dermatologist Dr. Tidwell called regarding biopsy results from left breast mass. Requests inpatient heme/onc consult. Viri: I discussed results with patient and daughter at bedside. She requests PT to see patient as she would like a prescription for an upright walker due to his parkinson's he is having difficulty ambulating with the regular walker. She is aware of the diagnosis of adenocarcinoma of the left breast. I informed her that I will give her Dr. Hinton's information to follow up for evaluation and then he can follow up with the [facility]. She is agreeable with this plan. PT consulted. Citarrella: PT saw patient and recommends U-step walking stabilizer walker, for which I printed a prescription for. Will arrange transportation home via ambulette.

## 2019-11-22 NOTE — ED ADULT TRIAGE NOTE - CHIEF COMPLAINT QUOTE
Patient BIBA, states was getting up out of a seat in kitchen, fell onto buttocks then onto back and hit head, denies LOC or blood thinner, complains of upper back pain

## 2019-11-22 NOTE — ED PROVIDER NOTE - CARE PROVIDER_API CALL
Rafaela Shrestha)  Surgery  [address] [place] [address]  Phone: [phone]  Fax: [phone]  Follow Up Time:

## 2019-11-22 NOTE — ED PROVIDER NOTE - OBJECTIVE STATEMENT
89 y/o M pt with hx of Parkinsons, bullous pemphigoid, and spinal stenosis who presents today s/p mechanical fall at home today. Pt was trying to get off of a stool when it slipped out from under him and he fell backwards. Pt states he hit his head and back. No LOC, no blood thinners. Pt is currently c/o neck pain and shoulder pain. Pt has chronic back pain due to his spinal stenosis, and reports the pain is currently unchanged. Pt has chronic blurred vision and motor weakness due to his parkinsons but reports that is currently unchanged as well. Pt denies any chest pain, dyspnea, fevers, n/v, abdominal pain, diarrhea, headache, cough, or other complaint. 87 y/o M pt with hx of Parkinsons, bullous pemphigoid, and spinal stenosis who presents today s/p mechanical fall at home today. Pt was trying to get off of a stool when it slipped out from under him and he fell backwards. Pt states he hit his head and back. No LOC, no blood thinners. Pt is currently c/o neck pain and shoulder pain. Pt has chronic back pain due to his spinal stenosis, and reports the pain is currently unchanged. Pt has chronic blurred vision and motor weakness due to his parkinsons but reports that is currently unchanged as well. Pt denies any chest pain, dyspnea, fevers, n/v, abdominal pain, diarrhea, headache, cough, or other complaint.  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  Clinical and radiologic correlation is advised in order to confirm this impression, and to exclude the possibility of another site of origin (less likely, skin or salivary gland, among others). IMMUNOHISTOCHEMICAL STUDIES:  BLOCK:          1A  FIXATIVE:       Formalin  ANTIBODY/PROBE:           RESULT/COMMENT:  Cytokeratin 7             Positive in the tumor cells  Cytokeratin 20            Negative in the tumor cells  GATA3                     Positive in the tumor cells (diffusely, strongly)  GCDFP15                   Positive in the tumor cells (patchily)  Mammaglobin               Positive in the tumor cells (diffusely, strongly)  TTF1                      Negative in the tumor cells  CDX2                      Negative in the tumor cells  Estrogen Receptors             Positive (3+, in >95% of tumor cells)  Progesterone Receptors         Positive (2-3+, in >90% of tumor cells)  Her2/edgar                  Negative (0-1+)  INTERPRETATION: Taken altogether, and in conjunction with the histologic features, these findings best support the above diagnosis. These results were communicated to Dr. HENRY Tidwell MD, via email on 11/22/2019. Case reported to Tumor Registry.

## 2019-11-22 NOTE — ED PROVIDER NOTE - PATIENT PORTAL LINK FT
You can access the FollowMyHealth Patient Portal offered by [facility] by registering at the following website: [email or web address]. By joining [facility] FollowMyHealth portal, you will also be able to view your health information using other applications (apps) compatible with our system.

## 2019-11-22 NOTE — ED PROVIDER NOTE - ATTENDING CONTRIBUTION TO CARE
I personally saw the patient with the resident, and completed the key components of the history and physical exam. I then discussed the management plan with the resident.     87 y/o M with PMH Parkinson's, bullous pemphigoid and spinal stensosis presents complaining of a mechanical fall off a stool today, falling backwards, hitting his head, no LOC, complains of back pain. He was recently diagnosed with left breast adenocarcinoma by punch biopsy, family is aware. Requests PT consult.  Exam: NAD, no cervical midline TTP, no lumbar midline TTP, pelvis stable, chronic lower extremity wounds with dressing in tact, normal ROM bilateral lower extremities, sensation intact bilateral LE.  CT head and C/S, lumbar XR, tylenol/robaxin for pain. Will have PT see.

## 2019-11-22 NOTE — PHYSICAL THERAPY INITIAL EVALUATION ADULT - ADDITIONAL COMMENTS
Daughter states that pt. is having severe difficulty sitting to standing and inability to take a few steps for transfers with standard walker.   Pt. would benefit from a U-step walking stabilizer walker.

## 2019-11-22 NOTE — ED PROVIDER NOTE - CARE PLAN
Principal Discharge DX:	Acute bilateral low back pain without sciatica  Secondary Diagnosis:	Fall from chair, initial encounter

## 2019-11-22 NOTE — CHART NOTE - NSCHARTNOTEFT_GEN_A_CORE
social work note: Worker arranged eula home with [facility]. Worker spoke with Lala Chahal with [facility] and will be here within 60-90 mins. No other SW needs

## 2019-11-22 NOTE — ED PROVIDER NOTE - CLINICAL SUMMARY MEDICAL DECISION MAKING FREE TEXT BOX
87 y/o M pt with hx of spinal stenosis and parkinsons presenting today after mechanical fall at home. Due to baseline neuro deficit from parkinsons and baseline lumbar pain from spinal stenosis difficult to determine extent of injury, will image head, c-spine, and lumbar spine and give tylenol/robaxin for pain

## 2020-01-16 ENCOUNTER — RX RENEWAL (OUTPATIENT)
Age: 85
End: 2020-01-16

## 2020-01-16 RX ORDER — CARBIDOPA AND LEVODOPA 25; 250 MG/1; MG/1
25-250 TABLET ORAL
Qty: 120 | Refills: 2 | Status: ACTIVE | COMMUNITY
Start: 2018-12-03 | End: 1900-01-01

## 2020-02-14 ENCOUNTER — APPOINTMENT (OUTPATIENT)
Dept: NEUROLOGY | Facility: CLINIC | Age: 85
End: 2020-02-14

## 2020-02-19 ENCOUNTER — RX RENEWAL (OUTPATIENT)
Age: 85
End: 2020-02-19

## 2020-02-19 RX ORDER — GABAPENTIN 300 MG/1
300 CAPSULE ORAL
Qty: 90 | Refills: 11 | Status: ACTIVE | COMMUNITY
Start: 2017-12-21 | End: 1900-01-01

## 2020-02-25 ENCOUNTER — APPOINTMENT (OUTPATIENT)
Dept: ORTHOPEDIC SURGERY | Facility: CLINIC | Age: 85
End: 2020-02-25

## 2020-03-27 ENCOUNTER — APPOINTMENT (OUTPATIENT)
Dept: NEUROLOGY | Facility: CLINIC | Age: 85
End: 2020-03-27

## 2020-03-30 ENCOUNTER — APPOINTMENT (OUTPATIENT)
Dept: DERMATOLOGY | Facility: CLINIC | Age: 85
End: 2020-03-30
Payer: MEDICARE

## 2020-03-30 PROCEDURE — 99213 OFFICE O/P EST LOW 20 MIN: CPT | Mod: 95

## 2020-04-07 ENCOUNTER — APPOINTMENT (OUTPATIENT)
Dept: SURGERY | Facility: CLINIC | Age: 85
End: 2020-04-07

## 2021-09-03 NOTE — PROGRESS NOTE ADULT - PROBLEM/PLAN-4
Bladder scan done and resulted  265 ml  
Pt again c/o nausea.  Will repeat Zofran.  
DISPLAY PLAN FREE TEXT
DISPLAY PLAN FREE TEXT

## 2022-08-29 NOTE — ED STATDOCS - RESPIRATORY, MLM
Pt called no one has called him about the procedure, please call him at [phone], thank you   Symmetric chest rise. Lungs CTA bilaterally.

## 2023-07-27 NOTE — ED PROVIDER NOTE - PRINCIPAL DIAGNOSIS
Patient here today for nurse blood pressure check.  Last dose of BP medication taken:  Today around 1230    BP Readings from Last 1 Encounters:   06/26/23 1416 (!) 140/100   06/26/23 1338 (!) 150/100     Pulse Readings from Last 1 Encounters:   06/26/23 75     Patient Reported Vitals         No data to display                 Last Clinician Visit for this condition: 6/26/23  Per that visit, plan of care: We did review his hypertension. His blood pressure was a little elevated. He states he did not take his medication today. We will have him back in two weeks for a nurse blood pressure check. We will repeat his labs at the next visit. I did refill his medications.   Recommended continuing Hydrochlorothiazide (MICROZIDE) 12.5 MG capsule. Take 1 capsule by mouth daily. Refills provided.   Recommended continuing Amlodipine (NORVASC) 10 MG tablet. Take 1 tablet by mouth daily. Refills provided.  Next office visit is scheduled for: 12/28/2023    Current BP Medications are: hydrochlorothiazide 12.5 mg, amlodipine 10 mg   Last refilled: 6/26/23    Please review and advise if there are any changes to current plan of care.  Per PCP: Increase hydrochlorothiazide to 25 mg. Rx sent to patient's preferred pharmacy     
Weakness

## 2025-03-08 NOTE — ED PROVIDER NOTE - OBSERVING MD:
Occupational Therapy Discharge Summary    Reason for therapy discharge:    Discharged to home.    Progress towards therapy goal(s). See goals on Care Plan in [facility] electronic health record for goal details.  Goals partially met.  Barriers to achieving goals:   discharge from facility.    Therapy recommendation(s):    Continue home exercise program.       Bri